# Patient Record
Sex: FEMALE | Race: WHITE | NOT HISPANIC OR LATINO | Employment: FULL TIME | URBAN - METROPOLITAN AREA
[De-identification: names, ages, dates, MRNs, and addresses within clinical notes are randomized per-mention and may not be internally consistent; named-entity substitution may affect disease eponyms.]

---

## 2017-05-09 ENCOUNTER — TELEPHONE (OUTPATIENT)
Dept: OBSTETRICS AND GYNECOLOGY | Age: 27
End: 2017-05-09

## 2018-02-14 ENCOUNTER — TELEPHONE (OUTPATIENT)
Dept: OBSTETRICS AND GYNECOLOGY | Age: 28
End: 2018-02-14

## 2018-02-16 ENCOUNTER — PROCEDURE VISIT (OUTPATIENT)
Dept: OBSTETRICS AND GYNECOLOGY | Age: 28
End: 2018-02-16

## 2018-02-16 ENCOUNTER — OFFICE VISIT (OUTPATIENT)
Dept: OBSTETRICS AND GYNECOLOGY | Age: 28
End: 2018-02-16

## 2018-02-16 VITALS
BODY MASS INDEX: 21.07 KG/M2 | DIASTOLIC BLOOD PRESSURE: 80 MMHG | WEIGHT: 147.2 LBS | SYSTOLIC BLOOD PRESSURE: 126 MMHG | HEIGHT: 70 IN

## 2018-02-16 DIAGNOSIS — R10.2 PELVIC PAIN: ICD-10-CM

## 2018-02-16 DIAGNOSIS — N83.201 CYST OF RIGHT OVARY: Primary | ICD-10-CM

## 2018-02-16 DIAGNOSIS — N94.2 VAGINISMUS: ICD-10-CM

## 2018-02-16 DIAGNOSIS — N83.209 CYST OF OVARY, UNSPECIFIED LATERALITY: Primary | ICD-10-CM

## 2018-02-16 PROCEDURE — 76830 TRANSVAGINAL US NON-OB: CPT | Performed by: OBSTETRICS & GYNECOLOGY

## 2018-02-16 PROCEDURE — 99213 OFFICE O/P EST LOW 20 MIN: CPT | Performed by: OBSTETRICS & GYNECOLOGY

## 2018-02-20 NOTE — PROGRESS NOTES
"Subjective     Chief Complaint   Patient presents with   • Follow-up     gyn us       Ailyn Bedoya is a 27 y.o.  whose LMP is Patient's last menstrual period was 2018 (approximate). presents with pelvic pain and h/o ovarian cyst. Seen in ER 2 days ago. CT scan noted \"fluid in pelvis and right ovarian cyst. Was having lower abdominal pain. dc'd ocps due to amenorrhea (was taking lo loestrin). Desires pregnancy maybe in .  3/2017 and was not sexually active before. She has been unable to have intercourse due to pain. She has had prior gyn pelvic exams w/ peds speculum that she did not tolerate well but she was able to tolerate a vaginal probe US today (although it was painful).      No Additional Complaints Reported    The following portions of the patient's history were reviewed and updated as appropriate:vital signs, allergies, current medications, past family history, past medical history, past social history, past surgical history and problem list      Review of Systems   Constitutional: negative for fever, chills, activity change, appetite change, fatigue and unexpected weight change.  Eyes: negative  Ears, nose, mouth, throat, and face: negative  Respiratory: negative  Cardiovascular: negative  Gastrointestinal: positive for abdominal pain  Genitourinary:positive for abnormal menstrual periods and dyspareunia  Musculoskeletal:negative  Neurological: negative  Behavioral/Psych: negative     Objective      /80  Ht 176.5 cm (69.5\")  Wt 66.8 kg (147 lb 3.2 oz)  LMP 2018 (Approximate)  BMI 21.43 kg/m2    Physical Exam    General:   alert, appears stated age and no distress   Heart: Not performed today   Lungs: Not performed today.   Breast: Not performed today   Neck:     Abdomen: {Not performed today   CVA: Not performed today   Pelvis: Not performed today   Extremities: Not performed today   Neurologic: negative   Psychiatric: Normal affect, judgement, and mood       Lab " Review   Labs: pap 3/2015 normal     Imaging   Ultrasound - Pelvic Vaginal  Uterus 8 x 5/7 x 4.4 cm, EML 5.9 mm, left ovary normal, right ovary w/ 2.7.x.2.4cm simple cyst, no free fluid    Assessment/Plan     ASSESSMENT  1. Cyst of right ovary    2. Pelvic pain    3. Vaginismus        PLAN  1. No orders of the defined types were placed in this encounter.      2. Medications prescribed this encounter:      No orders of the defined types were placed in this encounter.      3. Discussed functional ovarian cysts, recheck 6 wk to make sure it is resolved, not likely present all the time, and therefore not likely the etiology of why she has not been able to have intercourse. Discussed restarting ocps to suppress new ovarian cysts.    4. Likely cause of inability to have intercourse is vaginismus. Hymen not likely a problem as she was able to have a vaginal probe US and prior speculum exam, although it was very difficult for her. Recommend trying vaginal dilators at home w/ lubrication. Also discussed possibility to referral to a therapist for assistance.    Follow up: 6 week(s)    Aylin Hartman MD  2/20/2018

## 2018-03-14 ENCOUNTER — OFFICE VISIT CONVERTED (OUTPATIENT)
Dept: FAMILY MEDICINE CLINIC | Facility: CLINIC | Age: 28
End: 2018-03-14
Attending: NURSE PRACTITIONER

## 2018-04-11 ENCOUNTER — OFFICE VISIT (OUTPATIENT)
Dept: OBSTETRICS AND GYNECOLOGY | Age: 28
End: 2018-04-11

## 2018-04-11 ENCOUNTER — PROCEDURE VISIT (OUTPATIENT)
Dept: OBSTETRICS AND GYNECOLOGY | Age: 28
End: 2018-04-11

## 2018-04-11 VITALS
HEIGHT: 70 IN | DIASTOLIC BLOOD PRESSURE: 74 MMHG | SYSTOLIC BLOOD PRESSURE: 122 MMHG | WEIGHT: 147.2 LBS | BODY MASS INDEX: 21.07 KG/M2

## 2018-04-11 DIAGNOSIS — N83.201 OVARIAN CYST, RIGHT: Primary | ICD-10-CM

## 2018-04-11 DIAGNOSIS — R10.2 PELVIC PAIN: ICD-10-CM

## 2018-04-11 DIAGNOSIS — N83.201 CYST OF RIGHT OVARY: Primary | ICD-10-CM

## 2018-04-11 PROCEDURE — 76830 TRANSVAGINAL US NON-OB: CPT | Performed by: OBSTETRICS & GYNECOLOGY

## 2018-04-11 PROCEDURE — 99213 OFFICE O/P EST LOW 20 MIN: CPT | Performed by: OBSTETRICS & GYNECOLOGY

## 2018-04-11 RX ORDER — DICYCLOMINE HYDROCHLORIDE 10 MG/1
10 CAPSULE ORAL
COMMUNITY
End: 2021-09-15

## 2018-04-11 NOTE — PROGRESS NOTES
"Subjective     Chief Complaint   Patient presents with   • Follow-up     right ovarian cyst       Ailyn Bedoya is a 27 y.o.  whose LMP is Patient's last menstrual period was 04/10/2018 (exact date). presents for f/u of right ovarian cyst. 6 wks ago US measured 2.7 x 2.4 cm simple ovarian cyst. Pt had been seen in ER for pelvic pain and f/u in our office afterwards. She reports that the pelvic pain is better. She was also dx w/ vaginismus and was unable to be sexually active. This has improved lately. She plans pregnancy later this year.      No Additional Complaints Reported    The following portions of the patient's history were reviewed and updated as appropriate:vital signs, allergies, current medications, past family history, past medical history, past social history, past surgical history and problem list      Review of Systems   Constitutional: negative for fever, chills, activity change, appetite change, fatigue and unexpected weight change.  Eyes: negative  Ears, nose, mouth, throat, and face: negative  Respiratory: negative  Cardiovascular: negative  Gastrointestinal: negative  Genitourinary:negative  Musculoskeletal:negative  Neurological: negative  Behavioral/Psych: negative     Objective      /74   Ht 176.5 cm (69.5\")   Wt 66.8 kg (147 lb 3.2 oz)   LMP 04/10/2018 (Exact Date)   BMI 21.43 kg/m²     Physical Exam    General:   alert, appears stated age and no distress   Heart: Not performed today   Lungs: Not performed today.   Breast: Not performed today   Neck:     Abdomen: {soft NT ND   CVA: Not performed today   Pelvis: Not performed today   Extremities: Not performed today   Neurologic: negative   Psychiatric: Normal affect, judgement, and mood       Lab Review   Labs: No data reviewed     Imaging   Ultrasound - Pelvic Vaginal  Uterus 7 x 5 x 3.7 cm, normal ovaries, small right ovarian follicle, no free fluid, no masses    Assessment/Plan     ASSESSMENT  1. Ovarian cyst, right    2. " Pelvic pain        PLAN  1. No orders of the defined types were placed in this encounter.      2. Medications prescribed this encounter:        New Medications Ordered This Visit   Medications   • ibuprofen (ADVIL,MOTRIN) 100 MG tablet     Sig: Take 100 mg by mouth.   • dicyclomine (BENTYL) 10 MG capsule     Sig: Take 10 mg by mouth 4 (Four) Times a Day Before Meals & at Bedtime.       3. Right ovarian cyst resolved spontaneously, no further evaluation needed at this time  4. Recommend prenatal vitamins for preconception, recommend starting now as she is not on contraception    Follow up: annual and prn    Aylin Hartman MD  4/12/2018

## 2019-09-16 ENCOUNTER — OFFICE VISIT CONVERTED (OUTPATIENT)
Dept: FAMILY MEDICINE CLINIC | Facility: CLINIC | Age: 29
End: 2019-09-16
Attending: NURSE PRACTITIONER

## 2019-11-16 ENCOUNTER — HOSPITAL ENCOUNTER (EMERGENCY)
Facility: HOSPITAL | Age: 29
Discharge: HOME | End: 2019-11-16
Attending: EMERGENCY MEDICINE | Admitting: EMERGENCY MEDICINE

## 2019-11-16 VITALS
HEIGHT: 69 IN | DIASTOLIC BLOOD PRESSURE: 63 MMHG | HEART RATE: 68 BPM | WEIGHT: 159 LBS | SYSTOLIC BLOOD PRESSURE: 101 MMHG | TEMPERATURE: 98 F | BODY MASS INDEX: 23.55 KG/M2 | RESPIRATION RATE: 16 BRPM | OXYGEN SATURATION: 98 %

## 2019-11-16 DIAGNOSIS — M54.42 ACUTE LEFT-SIDED LOW BACK PAIN WITH LEFT-SIDED SCIATICA: Primary | ICD-10-CM

## 2019-11-16 LAB
ALBUMIN SERPL-MCNC: 4.7 G/DL (ref 3.5–5.2)
ALBUMIN/GLOB SERPL: 1.9 G/DL
ALP SERPL-CCNC: 81 U/L (ref 39–117)
ALT SERPL W P-5'-P-CCNC: 12 U/L (ref 1–33)
ANION GAP SERPL CALCULATED.3IONS-SCNC: 12.8 MMOL/L (ref 5–15)
AST SERPL-CCNC: 13 U/L (ref 1–32)
BASOPHILS # BLD AUTO: 0.05 10*3/MM3 (ref 0–0.2)
BASOPHILS NFR BLD AUTO: 1 % (ref 0–1.5)
BILIRUB SERPL-MCNC: 0.4 MG/DL (ref 0.2–1.2)
BILIRUB UR QL STRIP: NEGATIVE
BUN BLD-MCNC: 14 MG/DL (ref 6–20)
BUN/CREAT SERPL: 17.7 (ref 7–25)
CALCIUM SPEC-SCNC: 9.3 MG/DL (ref 8.6–10.5)
CHLORIDE SERPL-SCNC: 105 MMOL/L (ref 98–107)
CLARITY UR: CLEAR
CO2 SERPL-SCNC: 27.2 MMOL/L (ref 22–29)
COLOR UR: YELLOW
CREAT BLD-MCNC: 0.79 MG/DL (ref 0.57–1)
DEPRECATED RDW RBC AUTO: 40.8 FL (ref 37–54)
EOSINOPHIL # BLD AUTO: 0.1 10*3/MM3 (ref 0–0.4)
EOSINOPHIL NFR BLD AUTO: 2.1 % (ref 0.3–6.2)
ERYTHROCYTE [DISTWIDTH] IN BLOOD BY AUTOMATED COUNT: 12.6 % (ref 12.3–15.4)
GFR SERPL CREATININE-BSD FRML MDRD: 87 ML/MIN/1.73
GLOBULIN UR ELPH-MCNC: 2.5 GM/DL
GLUCOSE BLD-MCNC: 77 MG/DL (ref 65–99)
GLUCOSE UR STRIP-MCNC: NEGATIVE MG/DL
HCG SERPL QL: NEGATIVE
HCT VFR BLD AUTO: 40.3 % (ref 34–46.6)
HGB BLD-MCNC: 13 G/DL (ref 12–15.9)
HGB UR QL STRIP.AUTO: NEGATIVE
IMM GRANULOCYTES # BLD AUTO: 0.02 10*3/MM3 (ref 0–0.05)
IMM GRANULOCYTES NFR BLD AUTO: 0.4 % (ref 0–0.5)
KETONES UR QL STRIP: NEGATIVE
LEUKOCYTE ESTERASE UR QL STRIP.AUTO: NEGATIVE
LYMPHOCYTES # BLD AUTO: 1.71 10*3/MM3 (ref 0.7–3.1)
LYMPHOCYTES NFR BLD AUTO: 35.5 % (ref 19.6–45.3)
MCH RBC QN AUTO: 28.5 PG (ref 26.6–33)
MCHC RBC AUTO-ENTMCNC: 32.3 G/DL (ref 31.5–35.7)
MCV RBC AUTO: 88.4 FL (ref 79–97)
MONOCYTES # BLD AUTO: 0.4 10*3/MM3 (ref 0.1–0.9)
MONOCYTES NFR BLD AUTO: 8.3 % (ref 5–12)
NEUTROPHILS # BLD AUTO: 2.54 10*3/MM3 (ref 1.7–7)
NEUTROPHILS NFR BLD AUTO: 52.7 % (ref 42.7–76)
NITRITE UR QL STRIP: NEGATIVE
NRBC BLD AUTO-RTO: 0 /100 WBC (ref 0–0.2)
PH UR STRIP.AUTO: 5.5 [PH] (ref 5–8)
PLATELET # BLD AUTO: 295 10*3/MM3 (ref 140–450)
PMV BLD AUTO: 9.5 FL (ref 6–12)
POTASSIUM BLD-SCNC: 4 MMOL/L (ref 3.5–5.2)
PROT SERPL-MCNC: 7.2 G/DL (ref 6–8.5)
PROT UR QL STRIP: NEGATIVE
RBC # BLD AUTO: 4.56 10*6/MM3 (ref 3.77–5.28)
SODIUM BLD-SCNC: 145 MMOL/L (ref 136–145)
SP GR UR STRIP: 1.02 (ref 1–1.03)
UROBILINOGEN UR QL STRIP: NORMAL
WBC NRBC COR # BLD: 4.82 10*3/MM3 (ref 3.4–10.8)

## 2019-11-16 PROCEDURE — 80053 COMPREHEN METABOLIC PANEL: CPT | Performed by: NURSE PRACTITIONER

## 2019-11-16 PROCEDURE — 63710000001 PREDNISONE PER 1 MG: Performed by: NURSE PRACTITIONER

## 2019-11-16 PROCEDURE — 81003 URINALYSIS AUTO W/O SCOPE: CPT | Performed by: NURSE PRACTITIONER

## 2019-11-16 PROCEDURE — 96374 THER/PROPH/DIAG INJ IV PUSH: CPT

## 2019-11-16 PROCEDURE — 84703 CHORIONIC GONADOTROPIN ASSAY: CPT | Performed by: NURSE PRACTITIONER

## 2019-11-16 PROCEDURE — 25010000002 KETOROLAC TROMETHAMINE PER 15 MG: Performed by: NURSE PRACTITIONER

## 2019-11-16 PROCEDURE — 99283 EMERGENCY DEPT VISIT LOW MDM: CPT

## 2019-11-16 PROCEDURE — 85025 COMPLETE CBC W/AUTO DIFF WBC: CPT | Performed by: NURSE PRACTITIONER

## 2019-11-16 RX ORDER — PREDNISONE 20 MG/1
60 TABLET ORAL ONCE
Status: COMPLETED | OUTPATIENT
Start: 2019-11-16 | End: 2019-11-16

## 2019-11-16 RX ORDER — TRAMADOL HYDROCHLORIDE 50 MG/1
50 TABLET ORAL ONCE
Status: COMPLETED | OUTPATIENT
Start: 2019-11-16 | End: 2019-11-16

## 2019-11-16 RX ORDER — SODIUM CHLORIDE 0.9 % (FLUSH) 0.9 %
10 SYRINGE (ML) INJECTION AS NEEDED
Status: DISCONTINUED | OUTPATIENT
Start: 2019-11-16 | End: 2019-11-16 | Stop reason: HOSPADM

## 2019-11-16 RX ORDER — NAPROXEN 500 MG/1
500 TABLET ORAL 2 TIMES DAILY WITH MEALS
Qty: 25 TABLET | Refills: 0 | Status: SHIPPED | OUTPATIENT
Start: 2019-11-16 | End: 2021-09-15

## 2019-11-16 RX ORDER — PREDNISONE 20 MG/1
40 TABLET ORAL DAILY
Qty: 10 TABLET | Refills: 0 | Status: SHIPPED | OUTPATIENT
Start: 2019-11-16 | End: 2019-11-21

## 2019-11-16 RX ORDER — KETOROLAC TROMETHAMINE 30 MG/ML
15 INJECTION, SOLUTION INTRAMUSCULAR; INTRAVENOUS ONCE
Status: COMPLETED | OUTPATIENT
Start: 2019-11-16 | End: 2019-11-16

## 2019-11-16 RX ORDER — BACLOFEN 10 MG/1
10 TABLET ORAL 2 TIMES DAILY
Qty: 15 TABLET | Refills: 0 | Status: SHIPPED | OUTPATIENT
Start: 2019-11-16 | End: 2021-09-15

## 2019-11-16 RX ADMIN — TRAMADOL HYDROCHLORIDE 50 MG: 50 TABLET, FILM COATED ORAL at 14:35

## 2019-11-16 RX ADMIN — KETOROLAC TROMETHAMINE 15 MG: 30 INJECTION, SOLUTION INTRAMUSCULAR at 13:23

## 2019-11-16 RX ADMIN — PREDNISONE 60 MG: 20 TABLET ORAL at 13:22

## 2019-11-16 NOTE — DISCHARGE INSTRUCTIONS
"Ice to the area 4-5 times a day    (no heat for 4 days)    Can do over the counter muscle rub    Can try some back stretches    Return Precautions    Although you are being discharged from the ED today, I encourage you to return for worsening symptoms.  Things can, and do, change such that treatment at home with medication may not be adequate.      Specifically, return for any of the following:loss of bowel or bladder control    Chest pain, shortness of breath, pain or nausea and vomiting not controlled by medications provided.    Please make a follow up with your Primary Care Provider for a blood pressure recheck.   _____________________________________________________________________  STROKE IS AN EMERGENCY:  Act FAST and check for these signals    Face:   Does the face look uneven?  Arm:   Does one arm drift down?  Speech:  Does their speech sound strange?  Time:   Call 911 for any signs of a stroke    Stroke risk factors:  Atrial fibrillation  Diabetes   Family history of stroke  Heart disease  High cholesterol   Physical inactivity   Obesity   High blood pressure  Smoking      HEART ATTACK INFORMATION:  Symptoms of a heart attack:    Nausea       Sweating, or cold sweat   Feeling of impending doom    Jaw pain  Pain that travels down one or both arms   Shortness of breath  Chest pressure, squeezing or discomfort   Anxiety  Feeling of \"fullness\" in chest      Risk factors for heart attack:    Smoking    High cholesterol  High blood pressure  Family History    Obesity   Lack of exercise  Gender (men higher risk)  Diet   Stress  Age     Diabetes  Excessive alcohol     Cigarette smoking:  Cigarette smoking WILL shorten your life and causes many illnesses.  We recommend you stop smoking.  A free resource is :  1-800 QUIT NOW    National Suicide Prevention Lifeline:  1-614.612.1928  This is a national network of local crisis centers who provide free and confidential emotional support for people with emotional crisis " or emotional distress.    This service is provided 24 hours a day, 7 days a week

## 2019-11-16 NOTE — ED PROVIDER NOTES
Pt is a 29 y.o. female who presents to the ED complaining of left flank pain that radiates down to her left knee and into her LLQ. Pt states her pain is worse with movement, such as bending or twisting, and bearing weight on LLE. She denies taking anything for the pain. She reports Hx of UTI. She finished Macrobid 6 days ago. She denies urinary/fecal incontinence or fever. Pt is 4 months post partum. Pt is weaning off pumping.     On exam,  Constitutional: mildly distressed  Cardiovascular: heart is RRR, no murmur  Pulmonary: CTAB  Abdomen: soft, normoactive bowel sounds, nontender, nondistended  Musculoskeletal: C, T-, and L-spine are nontender. Positive leg raise at 90 degree bilaterally. 2+ patellar reflexes bilaterally.    Neurological: Awake, alert, leg strength 5/5 bilaterally with intact sensation.  Skin: no rash on back    Labs reviewed.   Urine negative; blood work is normal    Plan: Symptomatic treatment. Discharge pt home      MD ATTESTATION NOTE    The RADHA and I have discussed this patient's history, physical exam, and treatment plan.  I have reviewed the documentation and personally had a face to face interaction with the patient. I affirm the documentation and agree with the treatment and plan.  The attached note describes my personal findings.      Documentation assistance provided by ramiro Flanagan for Dr. Bridges. Information recorded by the ramiro was done at my direction and has been verified and validated by me.                Kirby Flanagan  11/16/19 9673       Zenon Bridges MD  11/16/19 0493

## 2019-11-16 NOTE — ED PROVIDER NOTES
EMERGENCY DEPARTMENT ENCOUNTER    Room Number:  39/39  Date seen:  2019  Time seen: 12:59 PM  PCP: Taryn Ortega PA-C  Historian: Jayce    HPI:  Chief complaint: Left flank pain  Context:Ailyn Bedoya is a 29 y.o. female who presents to the ED with c/o sharp left flank pain for 2-3 days that worsened this morning. Her pain intermittently radiates to her left hip and down her left leg. Pt's pain is aggravated with movement. She denies injury to back. fever, CP, SOA, abd pain, N/V/D, dysuria, and frequency. Pt had a UTI 2 weeks and was seen at a [facility]. She took a 7 day course of Nitrofurantoin. Pt saw her gynecologist yesterday and was told she has RBCs in her urinalysis. Pt is 4 months post partum and is pumping breast milk. Her first menstrual period since giving birth was 1 week ago.      Timing: constant   Duration: 2-3 days  Location: L flank  Quality: sharp  Intensity/Severity: moderate   Associated Symptoms: none   Aggravating Factors: movement  Alleviating Factors: none stated   Treatment before arrival: Pt was treated with Nitrofurantoin for a UTI 2 weeks ago        ALLERGIES  Patient has no known allergies.    PAST MEDICAL HISTORY  Active Ambulatory Problems     Diagnosis Date Noted   • No Active Ambulatory Problems     Resolved Ambulatory Problems     Diagnosis Date Noted   • No Resolved Ambulatory Problems     Past Medical History:   Diagnosis Date   • Ovarian cyst        PAST SURGICAL HISTORY  Past Surgical History:   Procedure Laterality Date   •  SECTION         FAMILY HISTORY  History reviewed. No pertinent family history.    SOCIAL HISTORY  Social History     Socioeconomic History   • Marital status: Single     Spouse name: Not on file   • Number of children: Not on file   • Years of education: Not on file   • Highest education level: Not on file   Tobacco Use   • Smoking status: Never Smoker   • Smokeless tobacco: Never Used   Substance and Sexual Activity   • Alcohol use:  No   • Drug use: No       REVIEW OF SYSTEMS  Review of Systems   Constitutional: Negative for activity change, appetite change, diaphoresis and fever.   HENT: Negative for trouble swallowing.    Eyes: Negative for visual disturbance.   Respiratory: Negative for cough, chest tightness, shortness of breath and wheezing.    Cardiovascular: Negative for chest pain, palpitations and leg swelling.   Gastrointestinal: Negative for abdominal pain, diarrhea, nausea and vomiting.   Genitourinary: Positive for flank pain (left). Negative for dysuria, frequency and hematuria.   Musculoskeletal: Negative for back pain.   Skin: Negative for rash.   Neurological: Negative for dizziness, speech difficulty and light-headedness.       PHYSICAL EXAM  ED Triage Vitals   Temp Heart Rate Resp BP SpO2   11/16/19 1237 11/16/19 1237 11/16/19 1237 11/16/19 1249 11/16/19 1237   98.2 °F (36.8 °C) 94 16 136/79 99 %      Temp src Heart Rate Source Patient Position BP Location FiO2 (%)   11/16/19 1237 11/16/19 1237 11/16/19 1249 11/16/19 1249 --   Tympanic Monitor Sitting Right arm      Physical Exam   Constitutional: She is oriented to person, place, and time and well-developed, well-nourished, and in no distress. She does not have a sickly appearance. No distress.   HENT:   Head: Normocephalic and atraumatic.   Mouth/Throat: Uvula is midline, oropharynx is clear and moist and mucous membranes are normal.   Eyes: EOM are normal. Pupils are equal, round, and reactive to light.   Neck: Normal range of motion. Neck supple.   Cardiovascular: Normal rate, regular rhythm, S1 normal, S2 normal and normal heart sounds. Exam reveals no gallop and no friction rub.   No murmur heard.  Pulmonary/Chest: Effort normal and breath sounds normal. She has no decreased breath sounds. She has no wheezes. She has no rhonchi. She has no rales.   Abdominal: Soft. Normal appearance and bowel sounds are normal. There is no tenderness. There is no rebound, no guarding  and no CVA tenderness.   Musculoskeletal: Normal range of motion.   Left lower lumbar tenderness in the area of the SI joint, there is no rash, no erythema,no step off   Neurological: She is alert and oriented to person, place, and time. GCS score is 15.   Skin: Skin is warm, dry and intact.   Psychiatric: Affect and judgment normal.   Nursing note and vitals reviewed.      LAB RESULTS  Recent Results (from the past 24 hour(s))   Urinalysis With Microscopic If Indicated (No Culture) - Urine, Clean Catch    Collection Time: 11/16/19 12:49 PM   Result Value Ref Range    Color, UA Yellow Yellow, Straw    Appearance, UA Clear Clear    pH, UA 5.5 5.0 - 8.0    Specific Gravity, UA 1.016 1.005 - 1.030    Glucose, UA Negative Negative    Ketones, UA Negative Negative    Bilirubin, UA Negative Negative    Blood, UA Negative Negative    Protein, UA Negative Negative    Leuk Esterase, UA Negative Negative    Nitrite, UA Negative Negative    Urobilinogen, UA 0.2 E.U./dL 0.2 - 1.0 E.U./dL   Comprehensive Metabolic Panel    Collection Time: 11/16/19  1:05 PM   Result Value Ref Range    Glucose 77 65 - 99 mg/dL    BUN 14 6 - 20 mg/dL    Creatinine 0.79 0.57 - 1.00 mg/dL    Sodium 145 136 - 145 mmol/L    Potassium 4.0 3.5 - 5.2 mmol/L    Chloride 105 98 - 107 mmol/L    CO2 27.2 22.0 - 29.0 mmol/L    Calcium 9.3 8.6 - 10.5 mg/dL    Total Protein 7.2 6.0 - 8.5 g/dL    Albumin 4.70 3.50 - 5.20 g/dL    ALT (SGPT) 12 1 - 33 U/L    AST (SGOT) 13 1 - 32 U/L    Alkaline Phosphatase 81 39 - 117 U/L    Total Bilirubin 0.4 0.2 - 1.2 mg/dL    eGFR Non African Amer 87 >60 mL/min/1.73    Globulin 2.5 gm/dL    A/G Ratio 1.9 g/dL    BUN/Creatinine Ratio 17.7 7.0 - 25.0    Anion Gap 12.8 5.0 - 15.0 mmol/L   hCG, Serum, Qualitative    Collection Time: 11/16/19  1:05 PM   Result Value Ref Range    HCG Qualitative Negative Negative   CBC Auto Differential    Collection Time: 11/16/19  1:05 PM   Result Value Ref Range    WBC 4.82 3.40 - 10.80 10*3/mm3     RBC 4.56 3.77 - 5.28 10*6/mm3    Hemoglobin 13.0 12.0 - 15.9 g/dL    Hematocrit 40.3 34.0 - 46.6 %    MCV 88.4 79.0 - 97.0 fL    MCH 28.5 26.6 - 33.0 pg    MCHC 32.3 31.5 - 35.7 g/dL    RDW 12.6 12.3 - 15.4 %    RDW-SD 40.8 37.0 - 54.0 fl    MPV 9.5 6.0 - 12.0 fL    Platelets 295 140 - 450 10*3/mm3    Neutrophil % 52.7 42.7 - 76.0 %    Lymphocyte % 35.5 19.6 - 45.3 %    Monocyte % 8.3 5.0 - 12.0 %    Eosinophil % 2.1 0.3 - 6.2 %    Basophil % 1.0 0.0 - 1.5 %    Immature Grans % 0.4 0.0 - 0.5 %    Neutrophils, Absolute 2.54 1.70 - 7.00 10*3/mm3    Lymphocytes, Absolute 1.71 0.70 - 3.10 10*3/mm3    Monocytes, Absolute 0.40 0.10 - 0.90 10*3/mm3    Eosinophils, Absolute 0.10 0.00 - 0.40 10*3/mm3    Basophils, Absolute 0.05 0.00 - 0.20 10*3/mm3    Immature Grans, Absolute 0.02 0.00 - 0.05 10*3/mm3    nRBC 0.0 0.0 - 0.2 /100 WBC       Ordered the above labs and independently reviewed the results.         MEDICATIONS GIVEN IN ER  Medications   sodium chloride 0.9 % flush 10 mL (not administered)   ketorolac (TORADOL) injection 15 mg (15 mg Intravenous Given 11/16/19 1323)   predniSONE (DELTASONE) tablet 60 mg (60 mg Oral Given 11/16/19 1322)         PROCEDURES  Procedures        PROGRESS, DATA ANALYSIS, CONSULTS AND MEDICAL DECISION MAKING  All labs have been independently reviewed by me. Discussion below represents my analysis of pertinent findings related to patient's condition, differential diagnosis, treatment plan and final disposition.          1:10 PM  Prednisone and Toradol ordered for pain.     1:55 PM  Rechecked pt who is resting in NAD. Informed pt of unremarkable labs. I think her pain is musculoskeletal in nature. Plan to discharge pt with muscle relaxers, steroids, and antiinflammatories. Pt understands and agrees with the plan, all questions answered.  Reviewed pt's history and workup with Dr. Bridges.  After a bedside evaluation, Dr. Bridges agrees with the plan of care.    DISCHARGE  The patient's history,  "physical exam, and lab findings were discussed with the physician, who also performed a face to face history and physical exam.  I discussed all results and noted any abnormalities with patient.  Discussed absoute need to recheck abnormalities with their family physician.  I answered any of the patient's questions.  Discussed plan for discharge, as there is no emergent indication for admission.  Pt is agreeable and understands need for follow up and repeat testing.  Pt is aware that discharge does not mean that nothing is wrong but it indicates no emergency is present and they must continue care with their family physician.  Pt is discharged with instructions to follow up with primary care doctor to have their blood pressure rechecked.       Disposition vitals:  /79 (BP Location: Right arm, Patient Position: Sitting)   Pulse 94   Temp 98.2 °F (36.8 °C) (Tympanic)   Resp 16   Ht 175.3 cm (69\")   Wt 72.1 kg (159 lb)   LMP 11/13/2019   SpO2 99%   BMI 23.48 kg/m²       DIAGNOSIS  Final diagnoses:   Acute left-sided low back pain with left-sided sciatica       FOLLOW UP   Taryn Ortega, SKY  [address] [place], [place] [id number]  [phone]    Schedule an appointment as soon as possible for a visit in 1 week  As needed, If symptoms worsen      RX     Medication List      New Prescriptions    baclofen 10 MG tablet  Commonly known as:  LIORESAL  Take 1 tablet by mouth 2 (Two) Times a Day. Can break into 1/2 if too   sedating     naproxen 500 MG tablet  Commonly known as:  NAPROSYN  Take 1 tablet by mouth 2 (Two) Times a Day With Meals.     predniSONE 20 MG tablet  Commonly known as:  DELTASONE  Take 2 tablets by mouth Daily for 5 days.            Documentation assistance provided by ramiro Camacho for MONO Pepper.  Information recorded by the ramiro was done at my direction and has been verified and validated by me.       Taryn Camacho  11/16/19 8688       Gabby Craig APRN  11/16/19 1404    "

## 2020-03-12 ENCOUNTER — OFFICE VISIT CONVERTED (OUTPATIENT)
Dept: FAMILY MEDICINE CLINIC | Facility: CLINIC | Age: 30
End: 2020-03-12
Attending: NURSE PRACTITIONER

## 2020-03-12 ENCOUNTER — HOSPITAL ENCOUNTER (OUTPATIENT)
Dept: FAMILY MEDICINE CLINIC | Facility: CLINIC | Age: 30
Discharge: HOME | End: 2020-03-12
Attending: NURSE PRACTITIONER

## 2020-03-13 ENCOUNTER — HOSPITAL ENCOUNTER (OUTPATIENT)
Dept: LAB | Facility: HOSPITAL | Age: 30
Discharge: HOME | End: 2020-03-13
Attending: NURSE PRACTITIONER

## 2020-03-13 LAB
CONV CREATININE URINE, RANDOM: 55.7 MG/DL (ref 10–300)
CONV PROTEIN TO CREATININE RATIO (RANDOM URINE): 0.1 {RATIO} (ref 0–0.1)
PROT UR-MCNC: 5.7 MG/DL

## 2020-03-14 LAB — BACTERIA UR CULT: NORMAL

## 2020-11-03 ENCOUNTER — HOSPITAL ENCOUNTER (OUTPATIENT)
Dept: LAB | Facility: HOSPITAL | Age: 30
Discharge: HOME | End: 2020-11-03
Attending: NURSE PRACTITIONER

## 2020-11-03 ENCOUNTER — OFFICE VISIT CONVERTED (OUTPATIENT)
Dept: FAMILY MEDICINE CLINIC | Facility: CLINIC | Age: 30
End: 2020-11-03
Attending: NURSE PRACTITIONER

## 2020-11-03 LAB
ALBUMIN SERPL-MCNC: 4.3 G/DL (ref 3.5–5)
ALBUMIN/GLOB SERPL: 1.9 {RATIO} (ref 1.4–2.6)
ALP SERPL-CCNC: 56 U/L (ref 42–98)
ALT SERPL-CCNC: 8 U/L (ref 10–40)
AMYLASE SERPL-CCNC: 80 U/L (ref 30–110)
ANION GAP SERPL CALC-SCNC: 14 MMOL/L (ref 8–19)
AST SERPL-CCNC: 14 U/L (ref 15–50)
BASOPHILS # BLD AUTO: 0.04 10*3/UL (ref 0–0.2)
BASOPHILS NFR BLD AUTO: 0.7 % (ref 0–3)
BILIRUB SERPL-MCNC: 0.42 MG/DL (ref 0.2–1.3)
BUN SERPL-MCNC: 10 MG/DL (ref 5–25)
BUN/CREAT SERPL: 14 {RATIO} (ref 6–20)
CALCIUM SERPL-MCNC: 9 MG/DL (ref 8.7–10.4)
CHLORIDE SERPL-SCNC: 103 MMOL/L (ref 99–111)
CONV ABS IMM GRAN: 0.01 10*3/UL (ref 0–0.2)
CONV CO2: 26 MMOL/L (ref 22–32)
CONV IMMATURE GRAN: 0.2 % (ref 0–1.8)
CONV TOTAL PROTEIN: 6.6 G/DL (ref 6.3–8.2)
CREAT UR-MCNC: 0.74 MG/DL (ref 0.5–0.9)
DEPRECATED RDW RBC AUTO: 43 FL (ref 36.4–46.3)
EOSINOPHIL # BLD AUTO: 0.09 10*3/UL (ref 0–0.7)
EOSINOPHIL # BLD AUTO: 1.6 % (ref 0–7)
ERYTHROCYTE [DISTWIDTH] IN BLOOD BY AUTOMATED COUNT: 12.4 % (ref 11.7–14.4)
GFR SERPLBLD BASED ON 1.73 SQ M-ARVRAT: >60 ML/MIN/{1.73_M2}
GLOBULIN UR ELPH-MCNC: 2.3 G/DL (ref 2–3.5)
GLUCOSE SERPL-MCNC: 83 MG/DL (ref 65–99)
HCT VFR BLD AUTO: 43.3 % (ref 37–47)
HGB BLD-MCNC: 13.5 G/DL (ref 12–16)
LIPASE SERPL-CCNC: 20 U/L (ref 5–51)
LYMPHOCYTES # BLD AUTO: 2.02 10*3/UL (ref 1–5)
LYMPHOCYTES NFR BLD AUTO: 36.8 % (ref 20–45)
MCH RBC QN AUTO: 29.3 PG (ref 27–31)
MCHC RBC AUTO-ENTMCNC: 31.2 G/DL (ref 33–37)
MCV RBC AUTO: 94.1 FL (ref 81–99)
MONOCYTES # BLD AUTO: 0.36 10*3/UL (ref 0.2–1.2)
MONOCYTES NFR BLD AUTO: 6.6 % (ref 3–10)
NEUTROPHILS # BLD AUTO: 2.97 10*3/UL (ref 2–8)
NEUTROPHILS NFR BLD AUTO: 54.1 % (ref 30–85)
NRBC CBCN: 0 % (ref 0–0.7)
OSMOLALITY SERPL CALC.SUM OF ELEC: 286 MOSM/KG (ref 273–304)
PLATELET # BLD AUTO: 266 10*3/UL (ref 130–400)
PMV BLD AUTO: 9.9 FL (ref 9.4–12.3)
POTASSIUM SERPL-SCNC: 4.1 MMOL/L (ref 3.5–5.3)
RBC # BLD AUTO: 4.6 10*6/UL (ref 4.2–5.4)
SODIUM SERPL-SCNC: 139 MMOL/L (ref 135–147)
T4 FREE SERPL-MCNC: 1.1 NG/DL (ref 0.9–1.8)
TSH SERPL-ACNC: 1.89 M[IU]/L (ref 0.27–4.2)
WBC # BLD AUTO: 5.49 10*3/UL (ref 4.8–10.8)

## 2020-11-05 ENCOUNTER — HOSPITAL ENCOUNTER (OUTPATIENT)
Dept: ULTRASOUND IMAGING | Facility: HOSPITAL | Age: 30
Discharge: HOME | End: 2020-11-05
Attending: NURSE PRACTITIONER

## 2021-05-10 NOTE — H&P
History and Physical      Patient Name: Ailyn Bedoya   Patient ID: [id number]   Sex: Female   YOB: 1990    Primary Care Provider: Lazarus VILLAREAL    Visit Date: November 3, 2020    Provider: MONO Kwon   Location: [facility] [place]   Location Address: [address] [place] [address], [address] [place] [address]  [id number]   Location Phone: [phone]          Chief Complaint  · Establish Care  · Cyst on stomach that is painful and has gotten bigger      History Of Present Illness  Ailyn Bedoya is a 30 year old /White female who presents for evaluation and treatment of:      Patient presents to the office to establish care   patient states that she has been evaluated approximately a year ago for a nodule in her epigastric region.  Patient states that she was told it was a subcutaneous cyst.  Patient states that she feels that this is got somewhat larger since the last evaluation.  She states it is tender to palpation.  She denies any redness or drainage from the area.  Patient denies ever having any imaging or lab work completed.  She denies any nausea vomiting at this time.  Patient denies any fevers.       Past Medical History  Disease Name Date Onset Notes   IBS (irritable bowel syndrome) --  --    Pap smear for cervical cancer screening  --          Past Surgical History  Procedure Name Date Notes   Ceasarian section --  --    D and C (dilation and curettage) with endometrial biopsy --  --          Allergy List  Allergen Name Date Reaction Notes   NO KNOWN DRUG ALLERGIES --  --  --        Allergies Reconciled  Family Medical History  Disease Name Relative/Age Notes   *No Known Family History  --          Reproductive History  Menstrual   Age Menarche: 19   Pregnancy Summary   Total Pregnancies: 0 Full Term: 0 Premature: 0   Ab Induced: 0 Ab Spontaneous: 0 Ectopics: 0   Multiples: 0 Livin         Social History  Finding Status Start/Stop Quantity Notes   Active but  "no formal exercise --  --/-- --  --    Alcohol Never --/-- --  03/14/2018 - never     --  --/-- --  --    No known infection risk --  --/-- --  --    Tobacco Never --/-- --  03/14/2018 - never          Review of Systems  · Constitutional  o Denies  o : fatigue, night sweats  · Eyes  o Denies  o : double vision, blurred vision  · HENT  o Denies  o : vertigo, recent head injury  · Breasts  o Denies  o : abnormal changes in breast size, additional breast symptoms except as noted in the HPI  · Cardiovascular  o Denies  o : chest pain, irregular heart beats  · Respiratory  o Denies  o : shortness of breath, productive cough  · Gastrointestinal  o Denies  o : nausea, vomiting  · Genitourinary  o Denies  o : dysuria, urinary retention  · Integument  o Denies  o : hair growth change, new skin lesions, changes to existing skin lesions or moles  · Neurologic  o Denies  o : altered mental status, seizures  · Musculoskeletal  o Denies  o : joint swelling, limitation of motion  · Endocrine  o Denies  o : cold intolerance, heat intolerance  · Heme-Lymph  o Denies  o : petechiae, lymph node enlargement or tenderness  · Allergic-Immunologic  o Denies  o : frequent illnesses      Vitals  Date Time BP Position Site L\R Cuff Size HR RR TEMP (F) WT  HT  BMI kg/m2 BSA m2 O2 Sat FR L/min FiO2        11/03/2020 09:38 /70 Sitting    92 - R  98.4 143lbs 0oz 5'  9\" 21.12 1.78 98 %            Physical Examination  · Constitutional  o Appearance  o : well-nourished, in no acute distress  · Neck  o Inspection/Palpation  o : normal appearance, no masses or tenderness, trachea midline  o Range of Motion  o : cervical range of motion within normal limits  o Thyroid  o : gland size normal, nontender, no nodules or masses present on palpation  · Respiratory  o Respiratory Effort  o : breathing unlabored  o Inspection of Chest  o : normal appearance  o Auscultation of Lungs  o : normal breath sounds throughout inspiration and " expiration  · Cardiovascular  o Heart  o :   § Auscultation of Heart  § : regular rate and rhythm, no murmurs, gallops or rubs  o Peripheral Vascular System  o :   § Extremities  § : no clubbing or edema  · Gastrointestinal  o Abdominal Examination  o : Mild tenderness noted at the epigastric region. Small round nodule noted with palpation proximately the size of a BB  · Lymphatic  o Neck  o : no lymphadenopathy present  · Musculoskeletal  o Spine  o :   § Inspection/Palpation  § : no spinal tenderness, scoliosis or kyphosis present  § Range of Motion  § : spine range of motion normal  o Right Upper Extremity  o :   § Inspection/Palpation  § : no tenderness to palpation, ROM normal  o Left Upper Extremity  o :   § Inspection/Palpation  § : no tenderness to palpation, ROM normal  o Right Lower Extremity  o :   § Inspection/Palpation  § : no joint or limb tenderness to palpation, ROM normal  o Left Lower Extremity  o :   § Inspection/Palpation  § : no joint or limb tenderness to palpation, ROM normal  · Skin and Subcutaneous Tissue  o General Inspection  o : no rashes or lesions present, no areas of discoloration  o Body Hair  o : hair normal for age, general body hair distribution normal for age  o Digits and Nails  o : no clubbing, cyanosis, deformities or edema present, normal appearing nails  · Neurologic  o Mental Status Examination  o :   § Orientation  § : grossly oriented to person, place and time  o Gait and Station  o : normal gait, able to stand without difficulty  · Psychiatric  o Judgement and Insight  o : judgment and insight intact  o Mood and Affect  o : mood normal, affect appropriate  o Presence of Abnormal Thoughts  o : no hallucinations, no delusions present, no psychotic thoughts          Assessment  · Screening for depression     V79.0/Z13.89  · Need for influenza vaccination     V04.81/Z23  · Soft tissue mass     729.99/M79.89  · Epigastric abdominal  pain     789.06/R10.13      Plan  · Orders  o Annual depression screening, 15 minutes (90909, ) - V79.0/Z13.89 - 11/03/2020  o ACO-18: Negative screen for clinical depression using a standardized tool () - V79.0/Z13.89 - 11/03/2020  o Immunization Admin Fee (Single) ([facility]) (70759) - V04.81/Z23 - 11/03/2020  o Fluzone Quadrivalent Vaccine, age 6 months + (21813) - V04.81/Z23 - 11/03/2020   Vaccine - Influenza; Dose: 0.5; Site: Left Deltoid; Route: Intramuscular; Date: 11/03/2020 09:58:00; Exp: 06/30/2021; Lot: [id number]; Mfg: Insight Plus pasteur; TradeName: Fluzone Quadrivalent; Administered By: Sandra Roth MA; Comment: Patient tolerated well  o CBC with Auto Diff [facility] (58120) - - 11/03/2020  o CMP [facility] (08522) - - 11/03/2020  o Thyroid Profile (99046, 80060, THYII) - - 11/03/2020  o ACO-39: Current medications updated and reviewed (1159F, ) - - 11/03/2020  o ACO-18: Negative screen for clinical depression using a standardized tool () - - 11/03/2020  o ACO-14: Influenza immunization administered or previously received [facility] () - - 11/03/2020  o ACO-40: Depression Remission at 12 months as demonstrated by a 12 month repeat PHQ-9 of less than 5 () - - 11/03/2020  o Amylase/Lipase Profile (ALPN) - - 11/03/2020  o US abdomen for localization (07302) - - 11/03/2020   soft tissue mass noted epigastric area  · Medications  o Medications have been Reconciled  o Transition of Care or Provider Policy  · Instructions  o Depression Screen completed and scanned into the EMR under the designated folder within the patient's documents.  o Today's PHQ-9 result is _0__  o Patient was educated/instructed on their diagnosis, treatment and medications prior to discharge from the clinic today.  o Minutes spent with patient including greater than 50% in Education/Counseling/Care Coordination.  o Time spent with the patient was minutes, more than 50% face to face.  o Electronically Identified Patient Education  Materials Provided Electronically  · Disposition  o Call or Return if symptoms worsen or persist.  o follow up in 6 months            Electronically Signed by: MONO Kwon -Author on November 3, 2020 10:34:03 AM

## 2021-05-14 VITALS
WEIGHT: 143 LBS | BODY MASS INDEX: 21.18 KG/M2 | HEIGHT: 69 IN | TEMPERATURE: 98.4 F | HEART RATE: 92 BPM | OXYGEN SATURATION: 98 % | SYSTOLIC BLOOD PRESSURE: 124 MMHG | DIASTOLIC BLOOD PRESSURE: 70 MMHG

## 2021-05-15 VITALS
DIASTOLIC BLOOD PRESSURE: 80 MMHG | SYSTOLIC BLOOD PRESSURE: 120 MMHG | HEIGHT: 69 IN | HEART RATE: 98 BPM | BODY MASS INDEX: 24.14 KG/M2 | WEIGHT: 163 LBS

## 2021-05-16 VITALS
BODY MASS INDEX: 21.62 KG/M2 | RESPIRATION RATE: 16 BRPM | DIASTOLIC BLOOD PRESSURE: 80 MMHG | SYSTOLIC BLOOD PRESSURE: 122 MMHG | TEMPERATURE: 98.5 F | WEIGHT: 146 LBS | HEIGHT: 69 IN | HEART RATE: 103 BPM | OXYGEN SATURATION: 99 %

## 2021-08-02 ENCOUNTER — APPOINTMENT (OUTPATIENT)
Dept: CT IMAGING | Facility: HOSPITAL | Age: 31
End: 2021-08-02

## 2021-08-02 ENCOUNTER — HOSPITAL ENCOUNTER (EMERGENCY)
Facility: HOSPITAL | Age: 31
Discharge: HOME | End: 2021-08-03
Attending: EMERGENCY MEDICINE | Admitting: EMERGENCY MEDICINE

## 2021-08-02 ENCOUNTER — APPOINTMENT (OUTPATIENT)
Dept: MRI IMAGING | Facility: HOSPITAL | Age: 31
End: 2021-08-02

## 2021-08-02 ENCOUNTER — TELEPHONE (OUTPATIENT)
Dept: FAMILY MEDICINE CLINIC | Facility: CLINIC | Age: 31
End: 2021-08-02

## 2021-08-02 DIAGNOSIS — R20.2 PARESTHESIA OF RIGHT LEG: Primary | ICD-10-CM

## 2021-08-02 LAB
ALBUMIN SERPL-MCNC: 4.6 G/DL (ref 3.5–5.2)
ALBUMIN/GLOB SERPL: 2 G/DL
ALP SERPL-CCNC: 57 U/L (ref 39–117)
ALT SERPL W P-5'-P-CCNC: 11 U/L (ref 1–33)
ANION GAP SERPL CALCULATED.3IONS-SCNC: 11.7 MMOL/L (ref 5–15)
APTT PPP: 25.3 SECONDS (ref 22.2–34.2)
AST SERPL-CCNC: 17 U/L (ref 1–32)
BASOPHILS # BLD AUTO: 0.05 10*3/MM3 (ref 0–0.2)
BASOPHILS NFR BLD AUTO: 0.6 % (ref 0–1.5)
BILIRUB SERPL-MCNC: 0.4 MG/DL (ref 0–1.2)
BUN SERPL-MCNC: 11 MG/DL (ref 6–20)
BUN/CREAT SERPL: 13.4 (ref 7–25)
CALCIUM SPEC-SCNC: 9.3 MG/DL (ref 8.6–10.5)
CHLORIDE SERPL-SCNC: 104 MMOL/L (ref 98–107)
CO2 SERPL-SCNC: 23.3 MMOL/L (ref 22–29)
CREAT SERPL-MCNC: 0.82 MG/DL (ref 0.57–1)
DEPRECATED RDW RBC AUTO: 40.7 FL (ref 37–54)
EOSINOPHIL # BLD AUTO: 0.02 10*3/MM3 (ref 0–0.4)
EOSINOPHIL NFR BLD AUTO: 0.2 % (ref 0.3–6.2)
ERYTHROCYTE [DISTWIDTH] IN BLOOD BY AUTOMATED COUNT: 12.1 % (ref 12.3–15.4)
GFR SERPL CREATININE-BSD FRML MDRD: 82 ML/MIN/1.73
GLOBULIN UR ELPH-MCNC: 2.3 GM/DL
GLUCOSE SERPL-MCNC: 100 MG/DL (ref 65–99)
HCG INTACT+B SERPL-ACNC: <0.5 MIU/ML
HCT VFR BLD AUTO: 41.3 % (ref 34–46.6)
HGB BLD-MCNC: 13.5 G/DL (ref 12–15.9)
IMM GRANULOCYTES # BLD AUTO: 0.01 10*3/MM3 (ref 0–0.05)
IMM GRANULOCYTES NFR BLD AUTO: 0.1 % (ref 0–0.5)
INR PPP: 1.04 (ref 2–3)
LYMPHOCYTES # BLD AUTO: 1.78 10*3/MM3 (ref 0.7–3.1)
LYMPHOCYTES NFR BLD AUTO: 21.2 % (ref 19.6–45.3)
MAGNESIUM SERPL-MCNC: 2 MG/DL (ref 1.6–2.6)
MCH RBC QN AUTO: 29.9 PG (ref 26.6–33)
MCHC RBC AUTO-ENTMCNC: 32.7 G/DL (ref 31.5–35.7)
MCV RBC AUTO: 91.6 FL (ref 79–97)
MONOCYTES # BLD AUTO: 0.28 10*3/MM3 (ref 0.1–0.9)
MONOCYTES NFR BLD AUTO: 3.3 % (ref 5–12)
NEUTROPHILS NFR BLD AUTO: 6.26 10*3/MM3 (ref 1.7–7)
NEUTROPHILS NFR BLD AUTO: 74.6 % (ref 42.7–76)
NRBC BLD AUTO-RTO: 0 /100 WBC (ref 0–0.2)
PLATELET # BLD AUTO: 299 10*3/MM3 (ref 140–450)
PMV BLD AUTO: 9.5 FL (ref 6–12)
POTASSIUM SERPL-SCNC: 3.9 MMOL/L (ref 3.5–5.2)
PROT SERPL-MCNC: 6.9 G/DL (ref 6–8.5)
PROTHROMBIN TIME: 11.3 SECONDS (ref 9.4–12)
RBC # BLD AUTO: 4.51 10*6/MM3 (ref 3.77–5.28)
SODIUM SERPL-SCNC: 139 MMOL/L (ref 136–145)
T4 FREE SERPL-MCNC: 1.27 NG/DL (ref 0.93–1.7)
TSH SERPL DL<=0.05 MIU/L-ACNC: 1.46 UIU/ML (ref 0.27–4.2)
WBC # BLD AUTO: 8.4 10*3/MM3 (ref 3.4–10.8)

## 2021-08-02 PROCEDURE — 84443 ASSAY THYROID STIM HORMONE: CPT | Performed by: EMERGENCY MEDICINE

## 2021-08-02 PROCEDURE — 70551 MRI BRAIN STEM W/O DYE: CPT

## 2021-08-02 PROCEDURE — 0 IOPAMIDOL PER 1 ML: Performed by: EMERGENCY MEDICINE

## 2021-08-02 PROCEDURE — 83735 ASSAY OF MAGNESIUM: CPT | Performed by: EMERGENCY MEDICINE

## 2021-08-02 PROCEDURE — 99283 EMERGENCY DEPT VISIT LOW MDM: CPT

## 2021-08-02 PROCEDURE — 85025 COMPLETE CBC W/AUTO DIFF WBC: CPT | Performed by: EMERGENCY MEDICINE

## 2021-08-02 PROCEDURE — 70498 CT ANGIOGRAPHY NECK: CPT

## 2021-08-02 PROCEDURE — 70450 CT HEAD/BRAIN W/O DYE: CPT

## 2021-08-02 PROCEDURE — 80053 COMPREHEN METABOLIC PANEL: CPT | Performed by: EMERGENCY MEDICINE

## 2021-08-02 PROCEDURE — 93005 ELECTROCARDIOGRAM TRACING: CPT | Performed by: EMERGENCY MEDICINE

## 2021-08-02 PROCEDURE — 84439 ASSAY OF FREE THYROXINE: CPT | Performed by: EMERGENCY MEDICINE

## 2021-08-02 PROCEDURE — 85730 THROMBOPLASTIN TIME PARTIAL: CPT | Performed by: EMERGENCY MEDICINE

## 2021-08-02 PROCEDURE — 84702 CHORIONIC GONADOTROPIN TEST: CPT | Performed by: EMERGENCY MEDICINE

## 2021-08-02 PROCEDURE — 85610 PROTHROMBIN TIME: CPT | Performed by: EMERGENCY MEDICINE

## 2021-08-02 RX ORDER — SODIUM CHLORIDE 0.9 % (FLUSH) 0.9 %
10 SYRINGE (ML) INJECTION AS NEEDED
Status: DISCONTINUED | OUTPATIENT
Start: 2021-08-02 | End: 2021-08-03 | Stop reason: HOSPADM

## 2021-08-02 RX ADMIN — IOPAMIDOL 100 ML: 755 INJECTION, SOLUTION INTRAVENOUS at 20:34

## 2021-08-02 NOTE — TELEPHONE ENCOUNTER
Caller: Aiyln Bedoya    Relationship to patient: Self    Best call back number: [phone]    Chief complaint: NUMBNESS/TINGLING RIGHT LEG    Patient directed to call 911 or go to their nearest emergency room.     Patient verbalized understanding: [x] Yes  [] No  If no, why?    Additional notes:  THE PATIENT STATED SHE HAS NUMBNESS AND TINGLING IN HER RIGHT LEG THAT STARTED 07/26/2021. IT STARTED ABOVE HER ANKLE IN HER SHIN AND NOW HAS SPREAD TO HER WHOLE LEG ON 08/01/2021. THE PATIENT STATED SHE WILL INFORM  AND GO TO [facility].

## 2021-08-02 NOTE — ED PROVIDER NOTES
Time: 5:03 PM EDT  Arrived by: private vehicle  Chief Complaint:   Chief Complaint   Patient presents with   • Numbness     History provided by: patient  History is limited by: N/A     History of Present Illness:  Patient is a 31 y.o. year old female that presents to the emergency department with numbness and tingling that started a week ago in both legs. Pt  and mother are at bedside. Pt has been having waxing and waning numbness but worsening today where it's been constant in the right leg. Pt describes it as paresthesia.    Pt has a history of scoliosis and has had tingling/numbness with that, but it isn't like her current symptoms. Pt is not on any medications. Pt has had a DNC and .     Neurologic Problem  The patient's primary symptoms include focal sensory loss. The patient's pertinent negatives include no syncope. This is a new problem. The current episode started 1 to 4 weeks ago. The neurological problem developed gradually. The problem has been gradually worsening since onset. There was lower extremity focality noted. Pertinent negatives include no back pain, chest pain, fever, neck pain, shortness of breath or vomiting. Past treatments include nothing.       Similar Symptoms Previously: no  Recently seen: no      Patient Care Team  Primary Care Provider: Giuseppe Luevano    Past Medical History:     No Known Allergies  Past Medical History:   Diagnosis Date   • Ovarian cyst    • Scoliosis      Past Surgical History:   Procedure Laterality Date   •  SECTION     • DILATATION AND CURETTAGE       History reviewed. No pertinent family history.    Home Medications:  Prior to Admission medications    Medication Sig Start Date End Date Taking? Authorizing Provider   baclofen (LIORESAL) 10 MG tablet Take 1 tablet by mouth 2 (Two) Times a Day. Can break into 1/2 if too sedating 19   Gabby Miranda APRN   dicyclomine (BENTYL) 10 MG capsule Take 10 mg by mouth 4 (Four) Times a Day Before  "Meals & at Bedtime.    Provider, MD Jean   ibuprofen (ADVIL,MOTRIN) 100 MG tablet Take 100 mg by mouth.    Provider, MD Jean   naproxen (NAPROSYN) 500 MG tablet Take 1 tablet by mouth 2 (Two) Times a Day With Meals. 11/16/19   Gabby Miranda APRN        Social History:   Social History     Tobacco Use   • Smoking status: Never Smoker   • Smokeless tobacco: Never Used   Substance Use Topics   • Alcohol use: No   • Drug use: No         Review of Systems:  Review of Systems   Constitutional: Negative for chills and fever.   HENT: Negative for nosebleeds.    Eyes: Negative for photophobia, redness and visual disturbance.   Respiratory: Negative for cough and shortness of breath.    Cardiovascular: Negative for chest pain.   Gastrointestinal: Negative for diarrhea and vomiting.   Genitourinary: Negative for dysuria and frequency.   Musculoskeletal: Negative for back pain and neck pain.   Skin: Negative for rash.   Neurological: Positive for numbness. Negative for seizures and syncope.        Physical Exam:  /74   Pulse 79   Temp 98.2 °F (36.8 °C) (Oral)   Resp 18   Ht 175.3 cm (69\")   Wt 68.6 kg (151 lb 3.8 oz)   LMP  (LMP Unknown)   SpO2 96%   BMI 22.33 kg/m²     Physical Exam  Vitals and nursing note reviewed.   Constitutional:       General: She is not in acute distress.     Appearance: Normal appearance. She is not ill-appearing.   HENT:      Head: Normocephalic and atraumatic.      Nose: Nose normal.      Mouth/Throat:      Mouth: Mucous membranes are moist.      Pharynx: Oropharynx is clear.   Eyes:      General: Lids are normal. No scleral icterus.     Extraocular Movements: Extraocular movements intact.      Conjunctiva/sclera: Conjunctivae normal.      Pupils: Pupils are equal, round, and reactive to light.      Right eye: Pupil is round and reactive.      Left eye: Pupil is round and reactive.   Neck:      Vascular: Carotid bruit (right) present.      Comments: No carotid bruit on " the left.   Cardiovascular:      Rate and Rhythm: Regular rhythm. Tachycardia present.      Heart sounds: Normal heart sounds. No murmur heard.     Pulmonary:      Effort: Pulmonary effort is normal. No respiratory distress.      Breath sounds: Normal breath sounds and air entry. No decreased air movement. No decreased breath sounds, wheezing, rhonchi or rales.      Comments: Lungs are clear bilaterally.   Chest:      Chest wall: No tenderness.   Abdominal:      Palpations: Abdomen is soft.      Tenderness: There is no abdominal tenderness. There is no guarding or rebound.      Comments: No rigidity.   Musculoskeletal:         General: No tenderness. Normal range of motion.      Cervical back: Normal range of motion and neck supple. No tenderness.      Right lower leg: No edema.      Left lower leg: No edema.   Skin:     General: Skin is warm and dry.      Findings: No rash.   Neurological:      General: No focal deficit present.      Mental Status: She is alert and oriented to person, place, and time.      Cranial Nerves: Cranial nerves are intact. No dysarthria.      Sensory: Sensory deficit present.      Motor: Motor function is intact. No weakness or pronator drift.      Coordination: Coordination is intact. Finger-Nose-Finger Test normal.      Deep Tendon Reflexes:      Reflex Scores:       Tricep reflexes are 2+ on the right side and 2+ on the left side.       Bicep reflexes are 2+ on the right side and 2+ on the left side.       Brachioradialis reflexes are 2+ on the right side and 2+ on the left side.       Patellar reflexes are 2+ on the right side and 2+ on the left side.       Achilles reflexes are 2+ on the right side and 2+ on the left side.     Comments: No aphasia. Decreased pinpoint sensation in left lower extremity.    Psychiatric:         Mood and Affect: Mood normal.         Behavior: Behavior normal.                Medications in the Emergency Department:  Medications   sodium chloride 0.9 %  flush 10 mL (has no administration in time range)   iopamidol (ISOVUE-370) 76 % injection 100 mL (100 mL Intravenous Given 8/2/21 2034)        Labs  Lab Results (last 24 hours)     Procedure Component Value Units Date/Time    aPTT [[id number]]  (Normal) Collected: 08/02/21 1859    Specimen: Blood Updated: 08/02/21 1920     PTT 25.3 seconds     Protime-INR [[id number]]  (Abnormal) Collected: 08/02/21 1859    Specimen: Blood Updated: 08/02/21 1920     Protime 11.3 Seconds      INR 1.04    Narrative:      Suggested Therapeutic Ranges For Oral Anticoagulant Therapy:  Level of Therapy                      INR Target Range  Standard Dose                            2.0-3.0  High Dose                                2.5-3.5  Patients not receiving anticoagulant  Therapy Normal Range                     0.6-1.2    CBC & Differential [[id number]]  (Abnormal) Collected: 08/02/21 1859    Specimen: Blood Updated: 08/02/21 1907    Narrative:      The following orders were created for panel order CBC & Differential.  Procedure                               Abnormality         Status                     ---------                               -----------         ------                     CBC Auto Differential[[id number]]        Abnormal            Final result                 Please view results for these tests on the individual orders.    Comprehensive Metabolic Panel [[id number]]  (Abnormal) Collected: 08/02/21 1859    Specimen: Blood Updated: 08/02/21 1933     Glucose 100 mg/dL      BUN 11 mg/dL      Creatinine 0.82 mg/dL      Sodium 139 mmol/L      Potassium 3.9 mmol/L      Chloride 104 mmol/L      CO2 23.3 mmol/L      Calcium 9.3 mg/dL      Total Protein 6.9 g/dL      Albumin 4.60 g/dL      ALT (SGPT) 11 U/L      AST (SGOT) 17 U/L      Alkaline Phosphatase 57 U/L      Total Bilirubin 0.4 mg/dL      eGFR Non African Amer 82 mL/min/1.73      Globulin 2.3 gm/dL      A/G Ratio 2.0 g/dL      BUN/Creatinine Ratio 13.4     Anion Gap 11.7  mmol/L     Narrative:      GFR Normal >60  Chronic Kidney Disease <60  Kidney Failure <15      hCG, Quantitative, Pregnancy [[id number]] Collected: 08/02/21 1859    Specimen: Blood Updated: 08/02/21 1930     HCG Quantitative <0.50 mIU/mL     Narrative:      HCG Ranges by Gestational Age    Females - non-pregnant premenopausal   </= 1mIU/mL HCG  Females - postmenopausal               </= 7mIU/mL HCG    3 Weeks       5.4   -      72 mIU/mL  4 Weeks      10.2   -     708 mIU/mL  5 Weeks       217   -   8,245 mIU/mL  6 Weeks       152   -  32,177 mIU/mL  7 Weeks     4,059   - 153,767 mIU/mL  8 Weeks    31,366   - 149,094 mIU/mL  9 Weeks    59,109   - 135,901 mIU/mL  10 Weeks   44,186   - 170,409 mIU/mL  12 Weeks   27,107   - 201,615 mIU/mL  14 Weeks   24,302   -  93,646 mIU/mL  15 Weeks   12,540   -  69,747 mIU/mL  16 Weeks    8,904   -  55,332 mIU/mL  17 Weeks    8,240   -  51,793 mIU/mL  18 Weeks    9,649   -  55,271 mIU/mL    Results may be falsely decreased if patient taking Biotin.      T4, Free [[id number]]  (Normal) Collected: 08/02/21 1859    Specimen: Blood Updated: 08/02/21 1938     Free T4 1.27 ng/dL     Narrative:      Results may be falsely increased if patient taking Biotin.      TSH [[id number]]  (Normal) Collected: 08/02/21 1859    Specimen: Blood Updated: 08/02/21 1938     TSH 1.460 uIU/mL     Magnesium [[id number]]  (Normal) Collected: 08/02/21 1859    Specimen: Blood Updated: 08/02/21 1933     Magnesium 2.0 mg/dL     CBC Auto Differential [[id number]]  (Abnormal) Collected: 08/02/21 1859    Specimen: Blood Updated: 08/02/21 1907     WBC 8.40 10*3/mm3      RBC 4.51 10*6/mm3      Hemoglobin 13.5 g/dL      Hematocrit 41.3 %      MCV 91.6 fL      MCH 29.9 pg      MCHC 32.7 g/dL      RDW 12.1 %      RDW-SD 40.7 fl      MPV 9.5 fL      Platelets 299 10*3/mm3      Neutrophil % 74.6 %      Lymphocyte % 21.2 %      Monocyte % 3.3 %      Eosinophil % 0.2 %      Basophil % 0.6 %      Immature Grans % 0.1 %       Neutrophils, Absolute 6.26 10*3/mm3      Lymphocytes, Absolute 1.78 10*3/mm3      Monocytes, Absolute 0.28 10*3/mm3      Eosinophils, Absolute 0.02 10*3/mm3      Basophils, Absolute 0.05 10*3/mm3      Immature Grans, Absolute 0.01 10*3/mm3      nRBC 0.0 /100 WBC            Imaging:  CT Head Without Contrast    Result Date: 8/2/2021  PROCEDURE: CT HEAD WO CONTRAST  COMPARISON: None.  INDICATIONS: trauma  PROTOCOL:   Standard imaging protocol performed    RADIATION:   DLP: 891.1 mGy*cm   MA and/or KV was adjusted to minimize radiation dose.    TECHNIQUE: After obtaining the patient's consent, CT images were obtained without non-ionic intravenous contrast material.  DISCUSSION:   A routine nonenhanced head CT was performed.  No acute brain abnormality is identified.  No acute intracranial hemorrhage.  No acute infarct is seen.  No acute skull fracture.  No midline shift or acute intracranial mass effect is seen.  The ventricular size and configuration are within normal limits.  CONCLUSION:   No acute brain abnormality is seen. Specifically, no acute intracranial hemorrhage, no acute infarct, no intracranial mass lesion, and no acute intracranial mass effect are appreciated.    PATI CLARK JR, MD       Electronically Signed and Approved By: PATI CLARK JR, MD on 8/02/2021 at 21:17             CT Angiogram Neck    Result Date: 8/2/2021  PROCEDURE: CT ANGIOGRAM NECK  COMPARISON: None.  INDICATIONS: RIGHT CAROTID BRUIT.  PROTOCOL:   Standard imaging protocol performed    RADIATION:   DLP: 474.4 mGy*cm   Automated exposure control was utilized to minimize radiation dose. CONTRAST: 100 cc Isovue 370 I.V. LABS:   eGFR: >60 ml/min/1.73m2  TECHNIQUE: After obtaining the patient's consent, 653 CT/CTA images of the neck were obtained with non-ionic intravenous contrast material. Multi-planar reformatted/3-D images were created to optimize visualization of vascular anatomy. Unless otherwise stated in this report, all  vascular stenoses involving the internal carotid arteries reported for this examination are derived by dividing the lesion diameter by the diameter of the normal internal carotid artery more distally (that is, using NASCET criteria).  Motion artifact obscures detail on the study.  FINDINGS:  LEFT INTERNAL CAROTID: No hemodynamically significant stenosis or dissection.  EXTERNAL CAROTID: No hemodynamically significant stenosis or dissection.  COMMON CAROTID: No hemodynamically significant stenosis or dissection.  VERTEBRAL: No hemodynamically significant stenosis or dissection.   RIGHT INTERNAL CAROTID: No hemodynamically significant stenosis or dissection.  EXTERNAL CAROTID: No hemodynamically significant stenosis or dissection.  COMMON CAROTID: No hemodynamically significant stenosis or dissection.  VERTEBRAL: No hemodynamically significant stenosis or dissection.   CONCLUSION: No hemodynamically significant stenoses are seen.  No emergent large vessel occlusion is identified.  No definite acute arterial injury is appreciated by CTA technique.  Motion artifact obscures detail on the study.    PATI CLARK JR, MD       Electronically Signed and Approved By: PATI CLARK JR, MD on 8/02/2021 at 21:32             MRI Brain Without Contrast    Result Date: 8/2/2021  PROCEDURE: MRI BRAIN WO CONTRAST  COMPARISONS: [facility], CT, CT ANGIOGRAM NECK, 8/02/2021, 20:38.   [facility], CT, CT HEAD WO CONTRAST, 8/02/2021, 20:34.  INDICATIONS: evaluate for stroke  TECHNIQUE: A variety of imaging planes and parameters were utilized for visualization of suspected pathology within the brain.  257 magnetic resonance (MR) images were obtained without contrast.  FINDINGS:  CEREBRUM: No edema, hemorrhage, mass, acute infarction, or inappropriate atrophy.  CEREBELLUM: No edema, hemorrhage, mass, acute infarction, or inappropriate atrophy.  BRAINSTEM: No edema, hemorrhage, mass, acute infarction, or  inappropriate atrophy.  CSF SPACES: Ventricles, cisterns, and sulci are appropriate for age.  No hydrocephalus, subarachnoid hemorrhage, or mass.  SKULL: No mass or other significant visible lesion.  SINUSES: Limited views demonstrate no significant mucosal thickening or fluid.  ORBITS: Limited views are unremarkable.  OTHER: Negative.   CONCLUSION: No acute findings are seen.  No acute infarct is identified.  No acute intracranial hemorrhage.  No midline shift or acute intracranial herniation syndrome is appreciated.  No acute brain abnormality is seen.      PATI CLARK JR, MD       Electronically Signed and Approved By: PATI CLARK JR, MD on 8/02/2021 at 23:04               Procedures:  Procedures    Progress  ED Course as of Aug 03 0021   Mon Aug 02, 2021   1903 EKG:    Rhythm: Sinus rhythm  Rate: 94  Intervals: Normal [place] and QT intervals  RSR prime in V1 and V2  T-wave: Nonspecific T wave flattening in V2  ST Segment: There is nonspecific ST depression in V4, V5, V6, II, III, and  aVF    EKG Comparison: EKG change from July 17, 2019, nonspecific changes    Interpreted by me        [SD]   4239 FINDINGS:          LEFT  INTERNAL CAROTID:   No hemodynamically significant stenosis or dissection.    EXTERNAL CAROTID:  No hemodynamically significant stenosis or dissection.    COMMON CAROTID:    No hemodynamically significant stenosis or dissection.    VERTEBRAL:    No hemodynamically significant stenosis or dissection.       RIGHT  INTERNAL CAROTID:   No hemodynamically significant stenosis or dissection.    EXTERNAL CAROTID:  No hemodynamically significant stenosis or dissection.    COMMON CAROTID:    No hemodynamically significant stenosis or dissection.    VERTEBRAL:    No hemodynamically significant stenosis or dissection.       CONCLUSION: No hemodynamically significant stenoses are seen.  No emergent large vessel occlusion   is identified.  No definite acute arterial injury is appreciated by CTA technique.   Motion artifact   obscures detail on the study.      CT Angiogram Neck [JW]   2214 DISCUSSION:   A routine nonenhanced head CT was performed.  No acute brain abnormality is   identified.  No acute intracranial hemorrhage.  No acute infarct is seen.  No acute skull fracture.    No midline shift or acute intracranial mass effect is seen.  The ventricular size and   configuration are within normal limits.      CONCLUSION:              No acute brain abnormality is seen. Specifically, no acute intracranial hemorrhage,   no acute infarct, no intracranial mass lesion, and no acute intracranial mass effect are   appreciated.         CT Head Without Contrast [JW]   2317 FINDINGS:          CEREBRUM:     No edema, hemorrhage, mass, acute infarction, or inappropriate atrophy.    CEREBELLUM: No edema, hemorrhage, mass, acute infarction, or inappropriate atrophy.    BRAINSTEM:    No edema, hemorrhage, mass, acute infarction, or inappropriate atrophy.    CSF SPACES:   Ventricles, cisterns, and sulci are appropriate for age.  No hydrocephalus,   subarachnoid hemorrhage, or mass.    SKULL: No mass or other significant visible lesion.    SINUSES:          Limited views demonstrate no significant mucosal thickening or fluid.    ORBITS:            Limited views are unremarkable.    OTHER:             Negative.       CONCLUSION: No acute findings are seen.  No acute infarct is identified.  No acute intracranial   hemorrhage.  No midline shift or acute intracranial herniation syndrome is appreciated.  No acute   brain abnormality is seen.   MRI Brain Without Contrast [JW]      ED Course User Index  [JW] Juliana Rodriguez  [SD] Harsh Cullen DO                         NIHSS (NIH Stroke Scale/Score) reviewed and/or performed as part of the patient evaluation and treatment planning process.  The result associated with this review/performance is: 1        Medical Decision Making:  MDM  Number of Diagnoses or Management  Options  Paresthesia of right leg  Diagnosis management comments: At the time of discharge, the patient appears well in no distress and nontoxic.  The patient was resting comfortably.  The patient had no measurable neurological deficits in the emergency department.  The patient did have slightly decreased pinprick sensation subjectively on the right lower extremity.  The patient had good deep tendon reflexes bilaterally of the lower extremities.  The patient also had good musculoskeletal strength.  The patient had no progressive symptoms in the emergency department.  The patient had a normal CT, CTA of the neck as well as MRI of the brain.  The patient was able to ambulate in the emergency department without difficulty.  The case was discussed with Dr. Hill, neurologist on-call.  He feels the patient can be discharged home and he would like them to call his office tomorrow for an appointment.  The patient felt comfortable to go home and comfortable with the follow-up plan.  The patient will return for any progressive neurological symptoms I have discussed this with her in detail.  The possible bruit in the carotid artery is felt may be to be referred pain from a murmur.  I will have the patient follow-up with her doctor discussed the need for an echocardiogram.  I have discussed this with her in detail as well.       Amount and/or Complexity of Data Reviewed  Clinical lab tests: reviewed  Tests in the radiology section of CPT®: reviewed  Tests in the medicine section of CPT®: reviewed         Final diagnoses:   Paresthesia of right leg        Disposition:  ED Disposition     ED Disposition Condition Comment    Discharge Stable           Documentation assistance provided by Juliana Rodriguez acting as scribe for Harsh Cullen DO. Information recorded by the scribe was done at my direction and has been verified and validated by me.          Juliana Rodriguez  08/02/21 4227       Juliana Rodriguez  08/02/21  1730       Harsh Cullen DO  08/04/21 0153

## 2021-08-03 ENCOUNTER — TELEPHONE (OUTPATIENT)
Dept: FAMILY MEDICINE CLINIC | Facility: CLINIC | Age: 31
End: 2021-08-03

## 2021-08-03 VITALS
TEMPERATURE: 98.2 F | BODY MASS INDEX: 22.4 KG/M2 | DIASTOLIC BLOOD PRESSURE: 74 MMHG | HEIGHT: 69 IN | WEIGHT: 151.24 LBS | HEART RATE: 79 BPM | RESPIRATION RATE: 18 BRPM | SYSTOLIC BLOOD PRESSURE: 119 MMHG | OXYGEN SATURATION: 96 %

## 2021-08-03 NOTE — DISCHARGE INSTRUCTIONS
Please return to the emergency room immediately for worsening numbness, weakness of your arms or legs, difficulties walking, difficulties with speech, difficulty swallowing, change in vision, double vision or any new neurological symptoms

## 2021-08-03 NOTE — TELEPHONE ENCOUNTER
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 PATIENT IS MOST AVAILABLE AFTER 3PM

## 2021-08-05 NOTE — TELEPHONE ENCOUNTER
Caller: Ailyn Bedoya    Relationship to patient: Self    Best call back number: [phone]    Patient is needing:PATIENT CALLED IN AGAIN ABOUT A DIFFERENT HOSPITAL FOLLOW UP APPOINTMENT WITH MONO MORIN. SHE IS AVAILABLE ALL DAY ON 8/11/2021 OR SHE WOULD LIKE AN APPOINTMENT IN THE AFTERNOON AFTER 3 OR EARLY AT 7:30AM IF POSSIBLE. PLEASE CALL PATIENT AND ADVISE.

## 2021-08-06 LAB — QT INTERVAL: 353 MS

## 2021-08-09 ENCOUNTER — OFFICE VISIT (OUTPATIENT)
Dept: FAMILY MEDICINE CLINIC | Facility: CLINIC | Age: 31
End: 2021-08-09

## 2021-08-09 ENCOUNTER — HOSPITAL ENCOUNTER (OUTPATIENT)
Dept: GENERAL RADIOLOGY | Facility: HOSPITAL | Age: 31
Discharge: HOME | End: 2021-08-09
Admitting: NURSE PRACTITIONER

## 2021-08-09 VITALS
OXYGEN SATURATION: 99 % | WEIGHT: 147 LBS | DIASTOLIC BLOOD PRESSURE: 74 MMHG | SYSTOLIC BLOOD PRESSURE: 122 MMHG | BODY MASS INDEX: 21.77 KG/M2 | TEMPERATURE: 98 F | HEART RATE: 66 BPM | HEIGHT: 69 IN

## 2021-08-09 DIAGNOSIS — R68.89 ABNORMAL ANKLE BRACHIAL INDEX: ICD-10-CM

## 2021-08-09 DIAGNOSIS — R20.0 NUMBNESS AND TINGLING OF RIGHT LEG: ICD-10-CM

## 2021-08-09 DIAGNOSIS — R09.89 DIMINISHED PULSES IN LOWER EXTREMITY: ICD-10-CM

## 2021-08-09 DIAGNOSIS — R20.2 NUMBNESS AND TINGLING OF RIGHT LEG: ICD-10-CM

## 2021-08-09 DIAGNOSIS — R20.0 NUMBNESS AND TINGLING OF RIGHT LEG: Primary | ICD-10-CM

## 2021-08-09 DIAGNOSIS — R20.2 NUMBNESS AND TINGLING OF RIGHT LEG: Primary | ICD-10-CM

## 2021-08-09 PROCEDURE — 99213 OFFICE O/P EST LOW 20 MIN: CPT | Performed by: NURSE PRACTITIONER

## 2021-08-09 PROCEDURE — 72100 X-RAY EXAM L-S SPINE 2/3 VWS: CPT

## 2021-08-09 NOTE — PROGRESS NOTES
"Chief Complaint  Follow-up (Paresthesia of right leg)    Subjective          Ailyn Bedoya presents to [facility]  Patient presents to the office today for follow-up regarding a recent emergency department visit.  Patient states that she was seen in the ER on August 2 for bilateral paresthesias in her lower legs.  States that the left leg has resolved but she continues to have numbness and tingling in the right leg.  She does have a history of scoliosis.  She states that she does have a history of low back pain as well.  Patient states that she has noticed some mild swelling of the right lower leg.  Denies any erythema of the calf.  States that the paresthesias can occur while she is pain in bed or it occurs after standing for extended periods.      Objective   Vital Signs:   /74 (BP Location: Left arm, Patient Position: Sitting, Cuff Size: Adult)   Pulse 66   Temp 98 °F (36.7 °C) (Temporal)   Ht 175.3 cm (69\")   Wt 66.7 kg (147 lb)   SpO2 99%   BMI 21.71 kg/m²     Physical Exam  Vitals reviewed.   Constitutional:       Appearance: Normal appearance.   Neck:      Vascular: Normal carotid pulses. No carotid bruit or JVD.   Cardiovascular:      Rate and Rhythm: Normal rate and regular rhythm.      Pulses:           Dorsalis pedis pulses are 1+ on the right side.        Posterior tibial pulses are 1+ on the right side.      Heart sounds: Normal heart sounds, S1 normal and S2 normal. No murmur heard.     Pulmonary:      Effort: Pulmonary effort is normal. No respiratory distress.      Breath sounds: Normal breath sounds.   Musculoskeletal:      Cervical back: Full passive range of motion without pain and normal range of motion.      Lumbar back: Normal.      Right foot: Decreased capillary refill. No swelling.      Comments: PHILLIP 0.91 on right   Feet:      Right foot:      Skin integrity: Skin integrity normal.      Comments: Delayed cap refill on right foot    Skin:     " General: Skin is warm and dry.   Neurological:      Mental Status: She is alert and oriented to person, place, and time.   Psychiatric:         Attention and Perception: Attention normal.         Mood and Affect: Mood normal.         Behavior: Behavior normal.        Result Review :                Assessment and Plan    Diagnoses and all orders for this visit:    1. Numbness and tingling of right leg (Primary)  -     XR Spine Lumbar 2 or 3 View; Future  -     US Segmental Limbs Lower Arterial; Future    2. Diminished pulses in lower extremity  -     US Segmental Limbs Lower Arterial; Future    3. Abnormal ankle brachial index  -     US Segmental Limbs Lower Arterial; Future        Follow Up   Return if symptoms worsen or fail to improve.  Patient was given instructions and counseling regarding her condition or for health maintenance advice. Please see specific information pulled into the AVS if appropriate.

## 2021-08-10 ENCOUNTER — TELEPHONE (OUTPATIENT)
Dept: FAMILY MEDICINE CLINIC | Facility: CLINIC | Age: 31
End: 2021-08-10

## 2021-08-10 NOTE — TELEPHONE ENCOUNTER
Caller: Ailyn Bedoya    Relationship: Self    Best call back number: [phone]     What is the best time to reach you: ANY    What was the call regarding: PT CALLED ABOUT THE ULTRA SOUND ON HER LEGS, IS REQUESTING THIS BE SCHEDULED AT THE EARLIEST CONVENIENCE.  THANK YOU.    Do you require a callback: YES

## 2021-08-13 ENCOUNTER — TELEPHONE (OUTPATIENT)
Dept: FAMILY MEDICINE CLINIC | Facility: CLINIC | Age: 31
End: 2021-08-13

## 2021-08-13 DIAGNOSIS — R68.89 ABNORMAL ANKLE BRACHIAL INDEX (ABI): ICD-10-CM

## 2021-08-13 DIAGNOSIS — R20.2 NUMBNESS AND TINGLING OF RIGHT LEG: Primary | ICD-10-CM

## 2021-08-13 DIAGNOSIS — R20.0 NUMBNESS AND TINGLING OF RIGHT LEG: Primary | ICD-10-CM

## 2021-08-13 DIAGNOSIS — R09.89 DIMINISHED PULSE: ICD-10-CM

## 2021-08-13 NOTE — TELEPHONE ENCOUNTER
Caller: Alesha Bedoyagwen MILLIGAN    Relationship: Self    Best call back number: [phone]    What was the call regarding: PATIENT CALLED STATING THAT SHE HAD AN ORDER FOR AN ULTRASOUND BUT WAS TOLD THAT THE ORDER WAS INCORRECT. SHE STATED THE OFFICE SENT IT BACK TO US TO BE FIXED, PATIENT IS VERIFYING THAT WE RECEIVED THIS AND THAT IT WILL BE RESENT WHEN CORRECTED.     IN ADDITION, THE PATIENT STATED THAT THE LAST TIME SHE WAS SEEN IN THE OFFICE, SHE AND BROWN SANTOS HAD DISCUSSED THE POSSIBILITY OF GOING TO A NEUROLOGIST. SHE STATED THAT SHE BELIEVED HE MAY HAVE BEEN LEANING TOWARD OTHER OPTIONS FOR TREATMENT. SHE WAS CURIOUS IF SHE SHOULD KEEP HER NEURO APPOINTMENT FOR NEXT WEEK OR IF BROWN HAD OTHER SUGGESTIONS FOR HER CARE. PLEASE ADVISE       Do you require a callback: YES

## 2021-08-18 ENCOUNTER — LAB (OUTPATIENT)
Dept: LAB | Facility: HOSPITAL | Age: 31
End: 2021-08-18

## 2021-08-18 ENCOUNTER — TRANSCRIBE ORDERS (OUTPATIENT)
Dept: ADMINISTRATIVE | Facility: HOSPITAL | Age: 31
End: 2021-08-18

## 2021-08-18 DIAGNOSIS — Q74.2 UNSPECIFIED CONGENITAL ANOMALY OF LOWER LIMB: Primary | ICD-10-CM

## 2021-08-18 DIAGNOSIS — Q74.2: ICD-10-CM

## 2021-08-18 DIAGNOSIS — Q74.2: Primary | ICD-10-CM

## 2021-08-18 LAB
25(OH)D3 SERPL-MCNC: 20.7 NG/ML
ALBUMIN SERPL-MCNC: 4.7 G/DL (ref 3.5–5.2)
ALBUMIN/GLOB SERPL: 2.1 G/DL
ALP SERPL-CCNC: 50 U/L (ref 39–117)
ALT SERPL W P-5'-P-CCNC: 6 U/L (ref 1–33)
ANION GAP SERPL CALCULATED.3IONS-SCNC: 9.6 MMOL/L (ref 5–15)
AST SERPL-CCNC: 12 U/L (ref 1–32)
BASOPHILS # BLD AUTO: 0.07 10*3/MM3 (ref 0–0.2)
BASOPHILS NFR BLD AUTO: 1 % (ref 0–1.5)
BILIRUB SERPL-MCNC: 0.7 MG/DL (ref 0–1.2)
BUN SERPL-MCNC: 9 MG/DL (ref 6–20)
BUN/CREAT SERPL: 12.3 (ref 7–25)
CALCIUM SPEC-SCNC: 9.2 MG/DL (ref 8.6–10.5)
CEA SERPL-MCNC: 0.63 NG/ML
CHLORIDE SERPL-SCNC: 102 MMOL/L (ref 98–107)
CHOLEST SERPL-MCNC: 157 MG/DL (ref 0–200)
CO2 SERPL-SCNC: 27.4 MMOL/L (ref 22–29)
CREAT SERPL-MCNC: 0.73 MG/DL (ref 0.57–1)
DEPRECATED RDW RBC AUTO: 38.2 FL (ref 37–54)
EOSINOPHIL # BLD AUTO: 0.04 10*3/MM3 (ref 0–0.4)
EOSINOPHIL NFR BLD AUTO: 0.5 % (ref 0.3–6.2)
ERYTHROCYTE [DISTWIDTH] IN BLOOD BY AUTOMATED COUNT: 11.3 % (ref 12.3–15.4)
FOLATE SERPL-MCNC: 6.77 NG/ML (ref 4.78–24.2)
GFR SERPL CREATININE-BSD FRML MDRD: 94 ML/MIN/1.73
GLOBULIN UR ELPH-MCNC: 2.2 GM/DL
GLUCOSE BLD-MCNC: 86 MG/DL (ref 74–155)
GLUCOSE SERPL-MCNC: 83 MG/DL (ref 65–99)
HBA1C MFR BLD: 5.03 % (ref 4.8–5.6)
HCT VFR BLD AUTO: 42.9 % (ref 34–46.6)
HDLC SERPL-MCNC: 54 MG/DL (ref 40–60)
HGB BLD-MCNC: 13.5 G/DL (ref 12–15.9)
IMM GRANULOCYTES # BLD AUTO: 0.01 10*3/MM3 (ref 0–0.05)
IMM GRANULOCYTES NFR BLD AUTO: 0.1 % (ref 0–0.5)
LDLC SERPL CALC-MCNC: 94 MG/DL (ref 0–100)
LDLC/HDLC SERPL: 1.75 {RATIO}
LYMPHOCYTES # BLD AUTO: 1.88 10*3/MM3 (ref 0.7–3.1)
LYMPHOCYTES NFR BLD AUTO: 25.6 % (ref 19.6–45.3)
MCH RBC QN AUTO: 29.3 PG (ref 26.6–33)
MCHC RBC AUTO-ENTMCNC: 31.5 G/DL (ref 31.5–35.7)
MCV RBC AUTO: 93.3 FL (ref 79–97)
MONOCYTES # BLD AUTO: 0.34 10*3/MM3 (ref 0.1–0.9)
MONOCYTES NFR BLD AUTO: 4.6 % (ref 5–12)
NEUTROPHILS NFR BLD AUTO: 4.99 10*3/MM3 (ref 1.7–7)
NEUTROPHILS NFR BLD AUTO: 68.2 % (ref 42.7–76)
NRBC BLD AUTO-RTO: 0 /100 WBC (ref 0–0.2)
PLATELET # BLD AUTO: 269 10*3/MM3 (ref 140–450)
PMV BLD AUTO: 10.5 FL (ref 6–12)
POTASSIUM SERPL-SCNC: 3.9 MMOL/L (ref 3.5–5.2)
PROT SERPL-MCNC: 6.9 G/DL (ref 6–8.5)
RBC # BLD AUTO: 4.6 10*6/MM3 (ref 3.77–5.28)
SODIUM SERPL-SCNC: 139 MMOL/L (ref 136–145)
T4 FREE SERPL-MCNC: 1.13 NG/DL (ref 0.93–1.7)
TRIGL SERPL-MCNC: 43 MG/DL (ref 0–150)
TSH SERPL DL<=0.05 MIU/L-ACNC: 1.08 UIU/ML (ref 0.27–4.2)
VIT B12 BLD-MCNC: 470 PG/ML (ref 211–946)
VLDLC SERPL-MCNC: 9 MG/DL (ref 5–40)
WBC # BLD AUTO: 7.33 10*3/MM3 (ref 3.4–10.8)

## 2021-08-18 PROCEDURE — 82947 ASSAY GLUCOSE BLOOD QUANT: CPT

## 2021-08-18 PROCEDURE — 82607 VITAMIN B-12: CPT

## 2021-08-18 PROCEDURE — 36415 COLL VENOUS BLD VENIPUNCTURE: CPT

## 2021-08-18 PROCEDURE — 86038 ANTINUCLEAR ANTIBODIES: CPT

## 2021-08-18 PROCEDURE — 83036 HEMOGLOBIN GLYCOSYLATED A1C: CPT

## 2021-08-18 PROCEDURE — 84443 ASSAY THYROID STIM HORMONE: CPT

## 2021-08-18 PROCEDURE — 80053 COMPREHEN METABOLIC PANEL: CPT

## 2021-08-18 PROCEDURE — 86618 LYME DISEASE ANTIBODY: CPT

## 2021-08-18 PROCEDURE — 83825 ASSAY OF MERCURY: CPT

## 2021-08-18 PROCEDURE — 80061 LIPID PANEL: CPT

## 2021-08-18 PROCEDURE — 82306 VITAMIN D 25 HYDROXY: CPT

## 2021-08-18 PROCEDURE — 84439 ASSAY OF FREE THYROXINE: CPT

## 2021-08-18 PROCEDURE — 82746 ASSAY OF FOLIC ACID SERUM: CPT

## 2021-08-18 PROCEDURE — 82175 ASSAY OF ARSENIC: CPT

## 2021-08-18 PROCEDURE — 83655 ASSAY OF LEAD: CPT

## 2021-08-18 PROCEDURE — 82378 CARCINOEMBRYONIC ANTIGEN: CPT

## 2021-08-18 PROCEDURE — 82300 ASSAY OF CADMIUM: CPT

## 2021-08-18 PROCEDURE — 85025 COMPLETE CBC W/AUTO DIFF WBC: CPT

## 2021-08-19 LAB — B BURGDOR IGG+IGM SER-ACNC: <0.91 ISR (ref 0–0.9)

## 2021-08-20 LAB
ANA TITR SER IF: NEGATIVE {TITER}
ARSENIC BLD-MCNC: 2 UG/L (ref 2–23)
CADMIUM BLD-MCNC: <0.5 UG/L (ref 0–1.2)
LEAD BLDV-MCNC: <1 UG/DL (ref 0–4)
MERCURY BLD-MCNC: <1 UG/L (ref 0–14.9)

## 2021-08-23 ENCOUNTER — HOSPITAL ENCOUNTER (OUTPATIENT)
Dept: CARDIOLOGY | Facility: HOSPITAL | Age: 31
Discharge: HOME | End: 2021-08-23
Admitting: NURSE PRACTITIONER

## 2021-08-23 ENCOUNTER — APPOINTMENT (OUTPATIENT)
Dept: CARDIOLOGY | Facility: HOSPITAL | Age: 31
End: 2021-08-23

## 2021-08-23 DIAGNOSIS — R68.89 ABNORMAL ANKLE BRACHIAL INDEX (ABI): ICD-10-CM

## 2021-08-23 DIAGNOSIS — R09.89 DIMINISHED PULSE: ICD-10-CM

## 2021-08-23 DIAGNOSIS — R20.2 NUMBNESS AND TINGLING OF RIGHT LEG: ICD-10-CM

## 2021-08-23 DIAGNOSIS — R20.0 NUMBNESS AND TINGLING OF RIGHT LEG: ICD-10-CM

## 2021-08-23 LAB
BH CV LOWER ARTERIAL LEFT ABI RATIO: 1.13
BH CV LOWER ARTERIAL LEFT DORSALIS PEDIS SYS MAX: 136 MMHG
BH CV LOWER ARTERIAL LEFT GREAT TOE SYS MAX: 75 MMHG
BH CV LOWER ARTERIAL LEFT LOW THIGH SYS MAX: 116 MMHG
BH CV LOWER ARTERIAL LEFT POPLITEAL SYS MAX: 126 MMHG
BH CV LOWER ARTERIAL LEFT POST TIBIAL SYS MAX: 118 MMHG
BH CV LOWER ARTERIAL LEFT TBI RATIO: 0.63
BH CV LOWER ARTERIAL RIGHT ABI RATIO: 1.18
BH CV LOWER ARTERIAL RIGHT DORSALIS PEDIS SYS MAX: 141 MMHG
BH CV LOWER ARTERIAL RIGHT GREAT TOE SYS MAX: 65 MMHG
BH CV LOWER ARTERIAL RIGHT LOW THIGH SYS MAX: 121 MMHG
BH CV LOWER ARTERIAL RIGHT POPLITEAL SYS MAX: 129 MMHG
BH CV LOWER ARTERIAL RIGHT POST TIBIAL SYS MAX: 128 MMHG
BH CV LOWER ARTERIAL RIGHT TBI RATIO: 0.54
MAXIMAL PREDICTED HEART RATE: 191 BPM
STRESS TARGET HR: 162 BPM
UPPER ARTERIAL LEFT ARM BRACHIAL SYS MAX: 116 MMHG
UPPER ARTERIAL RIGHT ARM BRACHIAL SYS MAX: 120 MMHG

## 2021-08-23 PROCEDURE — 93923 UPR/LXTR ART STDY 3+ LVLS: CPT | Performed by: SURGERY

## 2021-08-23 PROCEDURE — 93923 UPR/LXTR ART STDY 3+ LVLS: CPT

## 2021-08-24 ENCOUNTER — TELEPHONE (OUTPATIENT)
Dept: FAMILY MEDICINE CLINIC | Facility: CLINIC | Age: 31
End: 2021-08-24

## 2021-08-24 NOTE — TELEPHONE ENCOUNTER
Caller: Ailyn Bedoya    Relationship: Self    Best call back number: [phone]     What is the best time to reach you: ANY    Who are you requesting to speak with (clinical staff, provider,  specific staff member): PROVIDER    What was the call regarding: PATIENT STATED SHE HAD GOTTEN HER TEST RESULTS BACK IN [facility] AND HAD SOME QUESTIONS REGARDING THOSE RESULTS. PLEASE CALL BACK TO DISCUSS     Do you require a callback: YES

## 2021-09-15 ENCOUNTER — OFFICE VISIT (OUTPATIENT)
Dept: FAMILY MEDICINE CLINIC | Facility: CLINIC | Age: 31
End: 2021-09-15

## 2021-09-15 VITALS
WEIGHT: 146 LBS | HEART RATE: 93 BPM | BODY MASS INDEX: 21.62 KG/M2 | OXYGEN SATURATION: 100 % | SYSTOLIC BLOOD PRESSURE: 122 MMHG | HEIGHT: 69 IN | TEMPERATURE: 98.3 F | DIASTOLIC BLOOD PRESSURE: 80 MMHG

## 2021-09-15 DIAGNOSIS — R20.2 PARESTHESIA OF RIGHT LEG: Primary | ICD-10-CM

## 2021-09-15 PROCEDURE — 99212 OFFICE O/P EST SF 10 MIN: CPT | Performed by: NURSE PRACTITIONER

## 2021-09-15 NOTE — PROGRESS NOTES
"Chief Complaint  Follow-up (leg ultrasound )    Subjective          Ailyn Bedoya presents to [facility]  She presents to the office today to follow-up regarding her arterial ultrasound on her right leg.  Patient states that she did review the report as well as her lab work.  I did review this with her as well as her lab work which was all unremarkable.  Patient does state that the neurologist had felt that the numbness and tingling was coming from a pinched nerve from crossing her legs.  Patient states that she has consciously thought about her positioning of her legs and she has not had any paresthesias in 3 weeks.      Objective   Vital Signs:   /80   Pulse 93   Temp 98.3 °F (36.8 °C)   Ht 175.3 cm (69\")   Wt 66.2 kg (146 lb)   SpO2 100%   BMI 21.56 kg/m²     Physical Exam  Constitutional:       Appearance: Normal appearance.   Cardiovascular:      Rate and Rhythm: Normal rate and regular rhythm.      Heart sounds: Normal heart sounds, S1 normal and S2 normal. No murmur heard.     Pulmonary:      Effort: Pulmonary effort is normal. No respiratory distress.      Breath sounds: Normal breath sounds.   Skin:     General: Skin is warm and dry.   Neurological:      Mental Status: She is alert and oriented to person, place, and time.   Psychiatric:         Attention and Perception: Attention normal.         Mood and Affect: Mood normal.         Behavior: Behavior normal.        Result Review :                 Assessment and Plan    Diagnoses and all orders for this visit:    1. Paresthesia of right leg (Primary)        Follow Up   Return for Next scheduled follow up.  Patient was given instructions and counseling regarding her condition or for health maintenance advice. Please see specific information pulled into the AVS if appropriate.       "

## 2021-09-21 ENCOUNTER — INITIAL PRENATAL (OUTPATIENT)
Dept: OBSTETRICS AND GYNECOLOGY | Facility: CLINIC | Age: 31
End: 2021-09-21

## 2021-09-21 VITALS — DIASTOLIC BLOOD PRESSURE: 70 MMHG | SYSTOLIC BLOOD PRESSURE: 108 MMHG | WEIGHT: 151 LBS | BODY MASS INDEX: 22.3 KG/M2

## 2021-09-21 DIAGNOSIS — K58.9 IRRITABLE BOWEL SYNDROME, UNSPECIFIED TYPE: ICD-10-CM

## 2021-09-21 DIAGNOSIS — Z34.80 SUPERVISION OF OTHER NORMAL PREGNANCY, ANTEPARTUM: Primary | ICD-10-CM

## 2021-09-21 DIAGNOSIS — Z32.01 PREGNANCY TEST PERFORMED, PREGNANCY CONFIRMED: ICD-10-CM

## 2021-09-21 DIAGNOSIS — Z98.891 H/O: C-SECTION: ICD-10-CM

## 2021-09-21 DIAGNOSIS — R11.2 NON-INTRACTABLE VOMITING WITH NAUSEA, UNSPECIFIED VOMITING TYPE: ICD-10-CM

## 2021-09-21 DIAGNOSIS — R74.01 TRANSAMINITIS: ICD-10-CM

## 2021-09-21 LAB
ABO GROUP BLD: NORMAL
B-HCG UR QL: POSITIVE
BASOPHILS # BLD AUTO: 0.04 10*3/MM3 (ref 0–0.2)
BASOPHILS NFR BLD AUTO: 0.5 % (ref 0–1.5)
BLD GP AB SCN SERPL QL: NEGATIVE
DEPRECATED RDW RBC AUTO: 37.1 FL (ref 37–54)
EOSINOPHIL # BLD AUTO: 0.05 10*3/MM3 (ref 0–0.4)
EOSINOPHIL NFR BLD AUTO: 0.6 % (ref 0.3–6.2)
ERYTHROCYTE [DISTWIDTH] IN BLOOD BY AUTOMATED COUNT: 11.2 % (ref 12.3–15.4)
GLUCOSE UR STRIP-MCNC: NEGATIVE MG/DL
HBV SURFACE AG SERPL QL IA: NORMAL
HCT VFR BLD AUTO: 38.6 % (ref 34–46.6)
HCV AB SER DONR QL: NORMAL
HGB BLD-MCNC: 12.6 G/DL (ref 12–15.9)
HIV1+2 AB SER QL: NORMAL
IMM GRANULOCYTES # BLD AUTO: 0.05 10*3/MM3 (ref 0–0.05)
IMM GRANULOCYTES NFR BLD AUTO: 0.6 % (ref 0–0.5)
INTERNAL NEGATIVE CONTROL: ABNORMAL
INTERNAL POSITIVE CONTROL: ABNORMAL
LYMPHOCYTES # BLD AUTO: 1.8 10*3/MM3 (ref 0.7–3.1)
LYMPHOCYTES NFR BLD AUTO: 21.5 % (ref 19.6–45.3)
Lab: ABNORMAL
MCH RBC QN AUTO: 29.7 PG (ref 26.6–33)
MCHC RBC AUTO-ENTMCNC: 32.6 G/DL (ref 31.5–35.7)
MCV RBC AUTO: 91 FL (ref 79–97)
MONOCYTES # BLD AUTO: 0.44 10*3/MM3 (ref 0.1–0.9)
MONOCYTES NFR BLD AUTO: 5.3 % (ref 5–12)
NEUTROPHILS NFR BLD AUTO: 6 10*3/MM3 (ref 1.7–7)
NEUTROPHILS NFR BLD AUTO: 71.5 % (ref 42.7–76)
NRBC BLD AUTO-RTO: 0 /100 WBC (ref 0–0.2)
PLATELET # BLD AUTO: 265 10*3/MM3 (ref 140–450)
PMV BLD AUTO: 10.4 FL (ref 6–12)
PROT UR STRIP-MCNC: NEGATIVE MG/DL
RBC # BLD AUTO: 4.24 10*6/MM3 (ref 3.77–5.28)
RH BLD: POSITIVE
WBC # BLD AUTO: 8.38 10*3/MM3 (ref 3.4–10.8)

## 2021-09-21 PROCEDURE — 81025 URINE PREGNANCY TEST: CPT | Performed by: OBSTETRICS & GYNECOLOGY

## 2021-09-21 PROCEDURE — 86787 VARICELLA-ZOSTER ANTIBODY: CPT | Performed by: OBSTETRICS & GYNECOLOGY

## 2021-09-21 PROCEDURE — 88175 CYTOPATH C/V AUTO FLUID REDO: CPT | Performed by: OBSTETRICS & GYNECOLOGY

## 2021-09-21 PROCEDURE — 86803 HEPATITIS C AB TEST: CPT | Performed by: OBSTETRICS & GYNECOLOGY

## 2021-09-21 PROCEDURE — 87086 URINE CULTURE/COLONY COUNT: CPT | Performed by: OBSTETRICS & GYNECOLOGY

## 2021-09-21 PROCEDURE — 87591 N.GONORRHOEAE DNA AMP PROB: CPT | Performed by: OBSTETRICS & GYNECOLOGY

## 2021-09-21 PROCEDURE — 0501F PRENATAL FLOW SHEET: CPT | Performed by: OBSTETRICS & GYNECOLOGY

## 2021-09-21 PROCEDURE — 80053 COMPREHEN METABOLIC PANEL: CPT | Performed by: OBSTETRICS & GYNECOLOGY

## 2021-09-21 PROCEDURE — 87491 CHLMYD TRACH DNA AMP PROBE: CPT | Performed by: OBSTETRICS & GYNECOLOGY

## 2021-09-21 PROCEDURE — G0432 EIA HIV-1/HIV-2 SCREEN: HCPCS | Performed by: OBSTETRICS & GYNECOLOGY

## 2021-09-21 RX ORDER — DIPHENHYDRAMINE HYDROCHLORIDE 25 MG/1
25 CAPSULE ORAL 2 TIMES DAILY PRN
Qty: 60 TABLET | Refills: 1 | Status: ON HOLD | OUTPATIENT
Start: 2021-09-21 | End: 2022-04-11

## 2021-09-21 NOTE — PROGRESS NOTES
OB Initial Visit    CC- Here for care of current pregnancy     ANGEL Finalized: Due date finalized: SEE EPIC    Subjective:  31 y.o.  presenting for her first obstetrical visit.    LMP: Patient's last menstrual period was 2021. sure    COMPLAINS OF: Nausea    Reviewed and updated:  OBHx, GYNHx (STDs), PMHx, Medications, Allergies, PSHx, Social Hx, Preventative Hx (PAP), Hx of abuse/safe environment, Vaccine Hx including hx of chickenpox or vaccine, Genetic Hx (pt, FOB, both families).        Objective:  /70   Wt 68.5 kg (151 lb)   LMP 2021   BMI 22.30 kg/m²   General- NAD, alert and oriented, appropriate  Psych- Normal mood, good memory  Neck- No masses, no thyroid enlargement  CV- Regular rhythm, no murnurs  Resp- CTA to bases, no wheezes  Abdomen- Soft, non distended, non tender, no masses     Breast left- No mass, non tender, no nipple discharge  Breast right- No mass, non tender, no nipple discharge    External genitalia- Normal, no lesions  Urethra- Normal, no masses, non tender  Vagina- Normal, no discharge  Bladder- Normal, no masses, non tender  Cvx- Normal, no lesions, no discharge, no CMT  Uterus- Normal shape and consistency, non tender, Consistent with dates  Adnexa- Normal, no mass, non tender    Lymphatic- No palpable neck, axillary, or groin nodes  Ext- No edema, no cyanosis    Skin- No lesions, no rashes, no acanthosis nigricans      Assessment and Plan:  Diagnoses and all orders for this visit:    1. Supervision of other normal pregnancy, antepartum (Primary)  Overview:  Initial visit:  • PNL:  • PAP/GC/CT/Urine culture:  • Blood type:  • VZV IgG:    Genetic testing:    • Counseled.  Declines all.     Vaccinations:  • COVID: x2 already received  • Influenza: Recommended 21  • Tdap:    24-28 weeks:  • 1hr GCT:  • Hct/Plt:  • Tdap Rx  • Rhogam:     Third Trimester:  • GBS:  • Breast pump:    Ultrasound:  • Dating: First tri US confirmed ANGEL by LMP  • Anatomy:   • Follow  up:     PLAN:    Plan US at 26 weeks and 30weeks due to hx  Considering - calculator next OV if pt desires      Orders:  -     POC Urinalysis Dipstick  -     POC Pregnancy, Urine  -     Urine Culture - Urine, Urine, Clean Catch  -     OB Panel With HIV  -     IGP, CtNg, Rfx Aptima HPV ASCU  -     Varicella Zoster Antibody, IgG    2. Pregnancy test performed, pregnancy confirmed  -     POC Pregnancy, Urine    3. Irritable bowel syndrome, unspecified type    4. Non-intractable vomiting with nausea, unspecified vomiting type  -     vitamin B-6 (PYRIDOXINE) 25 MG tablet; Take 1 tablet by mouth 2 (Two) Times a Day As Needed (Nausea).  Dispense: 60 tablet; Refill: 1  -     doxylamine (UNISOM) 25 MG tablet; Take 1 tablet by mouth At Night As Needed for Sleep or Nausea (or anxiety).  Dispense: 30 tablet; Refill: 1    5. Transaminitis  -     Comprehensive Metabolic Panel    6. H/O:             6w6d    Genetic Screening: Counseled.  Declines all.     Vaccines: APVACCINEHX: Recommend FLU vaccine this season, R/B discussed, s/p COVID vaccine    Counseling: Nutrition discussed, calories, activity/exercise in pregnancy, Discussed dietary restrictions/safety food preparation in pregnancy, Reviewed what to expect prenatal visits, office providers, Appropriate trimester precautions provided, N/V, vag bleeding, cramping, Questions answered    Labs: Prenatal labs, cultures, and PAP performed (prn), VZV IgG    Return in about 4 weeks (around 10/19/2021) for Follow up OB.  SCHEDULE 2 Q4WEEK OV..      Beata Castillo DO  2021    [facility] [place] [facility]  [address] [place] [address]  Dept: [phone]  Dept Fax: [phone]  Loc: [phone]  Loc Fax: [phone]

## 2021-09-22 LAB
ALBUMIN SERPL-MCNC: 4.4 G/DL (ref 3.5–5.2)
ALBUMIN/GLOB SERPL: 2 G/DL
ALP SERPL-CCNC: 45 U/L (ref 39–117)
ALT SERPL W P-5'-P-CCNC: 6 U/L (ref 1–33)
ANION GAP SERPL CALCULATED.3IONS-SCNC: 9 MMOL/L (ref 5–15)
AST SERPL-CCNC: 9 U/L (ref 1–32)
BACTERIA SPEC AEROBE CULT: NO GROWTH
BILIRUB SERPL-MCNC: 0.3 MG/DL (ref 0–1.2)
BUN SERPL-MCNC: 12 MG/DL (ref 6–20)
BUN/CREAT SERPL: 19.7 (ref 7–25)
CALCIUM SPEC-SCNC: 9.2 MG/DL (ref 8.6–10.5)
CHLORIDE SERPL-SCNC: 99 MMOL/L (ref 98–107)
CO2 SERPL-SCNC: 26 MMOL/L (ref 22–29)
CREAT SERPL-MCNC: 0.61 MG/DL (ref 0.57–1)
GFR SERPL CREATININE-BSD FRML MDRD: 116 ML/MIN/1.73
GLOBULIN UR ELPH-MCNC: 2.2 GM/DL
GLUCOSE SERPL-MCNC: 89 MG/DL (ref 65–99)
POTASSIUM SERPL-SCNC: 3.9 MMOL/L (ref 3.5–5.2)
PROT SERPL-MCNC: 6.6 G/DL (ref 6–8.5)
RPR SER QL: NORMAL
SODIUM SERPL-SCNC: 134 MMOL/L (ref 136–145)

## 2021-09-23 LAB
RUBV IGG SERPL IA-ACNC: 2.03 INDEX
VZV IGG SER IA-ACNC: <135 INDEX

## 2021-09-24 LAB
C TRACH RRNA CVX QL NAA+PROBE: NEGATIVE
CONV .: NORMAL
CYTOLOGIST CVX/VAG CYTO: NORMAL
CYTOLOGY CVX/VAG DOC CYTO: NORMAL
CYTOLOGY CVX/VAG DOC THIN PREP: NORMAL
DX ICD CODE: NORMAL
HIV 1 & 2 AB SER-IMP: NORMAL
N GONORRHOEA RRNA CVX QL NAA+PROBE: NEGATIVE
OTHER STN SPEC: NORMAL
STAT OF ADQ CVX/VAG CYTO-IMP: NORMAL

## 2021-10-19 ENCOUNTER — ROUTINE PRENATAL (OUTPATIENT)
Dept: OBSTETRICS AND GYNECOLOGY | Facility: CLINIC | Age: 31
End: 2021-10-19

## 2021-10-19 VITALS — DIASTOLIC BLOOD PRESSURE: 71 MMHG | SYSTOLIC BLOOD PRESSURE: 126 MMHG | WEIGHT: 153 LBS | BODY MASS INDEX: 22.59 KG/M2

## 2021-10-19 DIAGNOSIS — Z34.80 SUPERVISION OF OTHER NORMAL PREGNANCY, ANTEPARTUM: Primary | ICD-10-CM

## 2021-10-19 DIAGNOSIS — R74.01 TRANSAMINITIS: ICD-10-CM

## 2021-10-19 DIAGNOSIS — Z98.891 H/O: C-SECTION: ICD-10-CM

## 2021-10-19 LAB
GLUCOSE UR STRIP-MCNC: NEGATIVE MG/DL
PROT UR STRIP-MCNC: NEGATIVE MG/DL

## 2021-10-19 PROCEDURE — 0502F SUBSEQUENT PRENATAL CARE: CPT | Performed by: OBSTETRICS & GYNECOLOGY

## 2021-10-19 NOTE — PROGRESS NOTES
OB FOLLOW UP        Chief Complaint   Patient presents with   • Routine Prenatal Visit       Subjective:   • No complaints    Objective:  /71   Wt 69.4 kg (153 lb)   LMP 2021   BMI 22.59 kg/m²   Uterine Size: suprapubic  FHT: 110-160 BPM    See OB flow for LE edema, cvx exam if performed, and Upro/Uglu    Assessment and Plan:  10w6d  Reassuring pregnancy progress.  Questions answered.  Diagnoses and all orders for this visit:    1. Supervision of other normal pregnancy, antepartum (Primary)  Overview:  Initial visit:  • PNL: wnl  • PAP/GC/CT/Urine culture: All neg  • VZV IgG: Non immune  • CMP: wnl    Genetic testing:    • Counseled.  Declines all.     Vaccinations:  • COVID: x2 already received  • Influenza: Recommended 21  • Tdap:    24-28 weeks:  • 1hr GCT:  • Hct/Plt:  • Tdap Rx  • Rhogam: na, A+    Third Trimester:  • GBS:  • Breast pump:    Ultrasound:  • Dating: First tri US confirmed ANGEL by LMP  • Anatomy:   • Follow up:     PLAN:    VZV vaccination PP  Plan US at 26 weeks and 30weeks due to hx  Desires - 10/19/2021 calculator 81%, discussed, pt understands will not plan IOL.       Orders:  -     US Ob Detail Fetal Anatomy Single or First Gestation; Future  -     POC Urinalysis Dipstick    2. Transaminitis    3. H/O:       Counseling:    • First trimester precautions, bleeding, cramping, N/V  • Second trimester precautions  • Continue PNV.  Importance of healthy eating and exercise.    Return in about 4 weeks (around 2021) for Follow up OB x2.            Beata Castillo, DO  10/19/2021    [facility]  [facility] [place] [address]  Dept: [phone]  Dept Fax: [phone]  Loc: [phone]  Loc Fax: [phone]

## 2021-11-08 ENCOUNTER — TRANSCRIBE ORDERS (OUTPATIENT)
Dept: ADMINISTRATIVE | Facility: HOSPITAL | Age: 31
End: 2021-11-08

## 2021-11-08 ENCOUNTER — HOSPITAL ENCOUNTER (OUTPATIENT)
Dept: ULTRASOUND IMAGING | Facility: HOSPITAL | Age: 31
Discharge: HOME | End: 2021-11-08
Admitting: OBSTETRICS & GYNECOLOGY

## 2021-11-08 ENCOUNTER — TELEPHONE (OUTPATIENT)
Dept: OBSTETRICS AND GYNECOLOGY | Facility: CLINIC | Age: 31
End: 2021-11-08

## 2021-11-08 ENCOUNTER — ROUTINE PRENATAL (OUTPATIENT)
Dept: OBSTETRICS AND GYNECOLOGY | Facility: CLINIC | Age: 31
End: 2021-11-08

## 2021-11-08 VITALS — DIASTOLIC BLOOD PRESSURE: 74 MMHG | WEIGHT: 153 LBS | BODY MASS INDEX: 22.59 KG/M2 | SYSTOLIC BLOOD PRESSURE: 117 MMHG

## 2021-11-08 DIAGNOSIS — Z34.80 SUPERVISION OF OTHER NORMAL PREGNANCY, ANTEPARTUM: Primary | ICD-10-CM

## 2021-11-08 DIAGNOSIS — O42.90 AMNIOTIC FLUID LEAKING: Primary | ICD-10-CM

## 2021-11-08 DIAGNOSIS — O42.90 PREMATURE RUPTURE OF MEMBRANES IN PREGNANCY, DELIVERED: Primary | ICD-10-CM

## 2021-11-08 DIAGNOSIS — O42.90 PREMATURE RUPTURE OF MEMBRANES IN PREGNANCY, DELIVERED: ICD-10-CM

## 2021-11-08 LAB
GLUCOSE UR STRIP-MCNC: NEGATIVE MG/DL
PROT UR STRIP-MCNC: NEGATIVE MG/DL

## 2021-11-08 PROCEDURE — 76805 OB US >/= 14 WKS SNGL FETUS: CPT

## 2021-11-08 PROCEDURE — 0502F SUBSEQUENT PRENATAL CARE: CPT | Performed by: OBSTETRICS & GYNECOLOGY

## 2021-11-08 NOTE — PROGRESS NOTES
OB FOLLOW UP        Chief Complaint   Patient presents with   • Routine Prenatal Visit       Subjective:   • Started yesterday, 3 times, all after urinating.  Pad overnight was a bit wet, clear.    No VB.  No pain or cramping.      Objective:  /74   Wt 69.4 kg (153 lb)   LMP 2021   BMI 22.59 kg/m²  0.907 kg (2 lb)  Uterine Size: not examined, US today  FHT: 110-160 BPM  Pelvic- Spec: neg pool, neg valsalva, neg nitrazine.  No vag discharge.  Cvx appears closed.    See OB flow for LE edema, cvx exam if performed, and Upro/Uglu    US Ob 14 + Weeks Single or First Gestation (2021 14:50)     Assessment and Plan:  13w5d  Reassuring pregnancy progress.  Questions answered.  Diagnoses and all orders for this visit:    1. Supervision of other normal pregnancy, antepartum (Primary)  Overview:  Initial visit:  • PNL: wnl  • PAP/GC/CT/Urine culture: All neg  • VZV IgG: Non immune  • CMP: wnl    Genetic testing:    • Counseled.  Declines all.     Vaccinations:  • COVID: x2 already received  • Influenza: Recommended 21  • Tdap:    24-28 weeks:  • 1hr GCT:  • Hct/Plt:  • Tdap Rx  • Rhogam: na, A+    Third Trimester:  • GBS:  • Breast pump:    Ultrasound:  • Dating: First tri US confirmed ANGEL by LMP  • 2021 low lying placenta 13+5 weeks by ANGEL  • Anatomy:   • Follow up:     PLAN:  VZV vaccination PP  Plan US at 26 weeks and 30weeks due to hx  Desires - 10/19/2021 calculator 81%, discussed, pt understands will not plan IOL.   2021 reassurrance no evidence of ROM      Orders:  -     POC Urinalysis Dipstick    Counseling:    • Second trimester precautions  • Reassurrance.   • Continue PNV.  Importance of healthy eating and staying active.    Return if symptoms worsen or fail to improve and keep next scheduled OV.        Beata Castillo,   2021    [facility]  [facility] [place] [address]  Dept: [phone]  Dept Fax: [phone]  Loc:  [phone]  Loc Fax: [phone]

## 2021-11-08 NOTE — TELEPHONE ENCOUNTER
Pt called stating she feels as if she may be leaking amniotic fluid. She states she had three episodes of ''leaking'' yesterday even after emptying her bladder. She states she put a pad on and the fluid dried up clear. She states she woke up in the middle of the night with leakage also. She is concerned due to have her last baby in 2019 @ 33 weeks via emergency c section and there was minimal fluid noted at that time. Pts next appt is with you on 11/16/21. Please advise with recommendations.

## 2021-11-11 ENCOUNTER — DOCUMENTATION (OUTPATIENT)
Dept: OBSTETRICS AND GYNECOLOGY | Facility: CLINIC | Age: 31
End: 2021-11-11

## 2021-11-12 NOTE — PROGRESS NOTES
The patient contacted the answering service with concerns.  She reported she was having left upper quadrant pain, that radiated down into the abdomen, since yesterday.  She denied fever, chills, nausea or vomiting, vaginal bleeding, diarrhea or pelvic pain.  She was concerned this was contractions.  She had not taken any pain medication (tylenol)  Discussed that pain in this location should not be contractions or a gynecologic etiology.  Discussed that her only option for evaluation tonight was to come to the ED, or she could contact the office in the morning.    Electronically signed by Abraham Lawson MD, 11/11/21, 8:46 PM EST.

## 2021-11-16 ENCOUNTER — ROUTINE PRENATAL (OUTPATIENT)
Dept: OBSTETRICS AND GYNECOLOGY | Facility: CLINIC | Age: 31
End: 2021-11-16

## 2021-11-16 VITALS — BODY MASS INDEX: 22.45 KG/M2 | WEIGHT: 152 LBS | DIASTOLIC BLOOD PRESSURE: 78 MMHG | SYSTOLIC BLOOD PRESSURE: 119 MMHG

## 2021-11-16 DIAGNOSIS — Z34.80 SUPERVISION OF OTHER NORMAL PREGNANCY, ANTEPARTUM: Primary | ICD-10-CM

## 2021-11-16 DIAGNOSIS — Z98.891 H/O: C-SECTION: ICD-10-CM

## 2021-11-16 LAB
GLUCOSE UR STRIP-MCNC: NEGATIVE MG/DL
PROT UR STRIP-MCNC: ABNORMAL MG/DL

## 2021-11-16 PROCEDURE — 0502F SUBSEQUENT PRENATAL CARE: CPT | Performed by: OBSTETRICS & GYNECOLOGY

## 2021-11-16 NOTE — PROGRESS NOTES
OB FOLLOW UP        Chief Complaint   Patient presents with   • Routine Prenatal Visit     Trapped Gas       Subjective:   • No complaints   • Had Left upper quad pain, now gone after gas X    Objective:  /78   Wt 68.9 kg (152 lb)   LMP 2021   BMI 22.45 kg/m²  0.454 kg (1 lb)  Uterine Size: suprapubic  FHT: 110-160 BPM    See OB flow for LE edema, cvx exam if performed, and Upro/Uglu    Assessment and Plan:  14w6d  Reassuring pregnancy progress.  Questions answered.  Diagnoses and all orders for this visit:    1. Supervision of other normal pregnancy, antepartum (Primary)  Overview:  ANGEL finalized 21 by First tri US confirmed ANGEL by LMP    Initial visit:  • PNL: wnl  • PAP/GC/CT/Urine culture: All neg  • VZV IgG: Non immune  • CMP: wnl    Genetic testing:    • Counseled.  Declines all.     Vaccinations:  • COVID: x2 already received. 2021 booster recommended  • Influenza: vaccinated  • Tdap:    24-28 weeks:  • 1hr GCT:  • Hct/Plt:  • Tdap Rx  • Rhogam: na, A+    Third Trimester:  • GBS:  • Breast pump:    Ultrasound:  • 2021 low lying placenta 13+5 weeks by ANGEL  • Anatomy:   • Follow up:     PLAN:  VZV vaccination PP  Plan US at 26 weeks and 30weeks due to hx  Desires - 10/19/2021 calculator 81%, discussed, pt understands will not plan IOL.       Orders:  -     POC Urinalysis Dipstick    2. H/O:       Counseling:    • Second trimester precautions  • Continue PNV.  Importance of healthy eating and staying active.    Return in about 4 weeks (around 2021) for Follow up OB w anatomy US, then 4weeks later.        Beata Castillo, DO  2021    [facility] [place] [facility]  [address] [place] [address]  Dept: [phone]  Dept Fax: [phone]  Loc: [phone]  Loc Fax: [phone]

## 2021-12-14 ENCOUNTER — ROUTINE PRENATAL (OUTPATIENT)
Dept: OBSTETRICS AND GYNECOLOGY | Facility: CLINIC | Age: 31
End: 2021-12-14

## 2021-12-14 VITALS — BODY MASS INDEX: 23.18 KG/M2 | SYSTOLIC BLOOD PRESSURE: 123 MMHG | DIASTOLIC BLOOD PRESSURE: 85 MMHG | WEIGHT: 157 LBS

## 2021-12-14 DIAGNOSIS — Z98.891 H/O: C-SECTION: ICD-10-CM

## 2021-12-14 DIAGNOSIS — Z34.80 SUPERVISION OF OTHER NORMAL PREGNANCY, ANTEPARTUM: Primary | ICD-10-CM

## 2021-12-14 LAB
GLUCOSE UR STRIP-MCNC: NEGATIVE MG/DL
PROT UR STRIP-MCNC: NEGATIVE MG/DL

## 2021-12-14 PROCEDURE — 0502F SUBSEQUENT PRENATAL CARE: CPT | Performed by: OBSTETRICS & GYNECOLOGY

## 2021-12-14 NOTE — PROGRESS NOTES
OB FOLLOW UP      Chief Complaint   Patient presents with   • Routine Prenatal Visit     Subjective:   • Some nausea- has meds and they help    Objective:  /85   Wt 71.2 kg (157 lb)   LMP 2021   BMI 23.18 kg/m²  2.722 kg (6 lb)  Uterine Size: not examined, US today  FHT: 110-160 BPM    See OB flow for edema, cvx exam if performed, and Upro/Uglu    Assessment and Plan:  18w6d  Reassuring pregnancy progress.  Questions answered.  Diagnoses and all orders for this visit:    1. Supervision of other normal pregnancy, antepartum (Primary)  Overview:  ANGEL 21 by First tri US confirmed ANGEL by LMP    Genetic testing:  Counseled.  Declines all.     COVID: x2 already received. S/p booster  Influenza: vaccinated  Tdap:    ?sterilization:    Ultrasound:  2021 low lying placenta 13+5 weeks by ANGEL  Anatomy: 2021 complete CWD ant placenta  Follow up:     PLAN:  VZV vaccination PP  Plan US at 26 weeks and 30weeks due to hx  Desires - 10/19/2021 calculator 81%, discussed, pt understands will not plan IOL.       Orders:  -     POC Urinalysis Dipstick    2. H/O:       Counseling:    • Second trimester precautions  • Continue PNV.  Importance of healthy eating and staying active.    Return in about 4 weeks (around 2022) for Follow up OB x2 (second one w Glucola).      Beata Castillo, DO  2021    [facility] [place] [facility]  [address] [place] [address] [address] [place] [address]  Dept: [phone]  Dept Fax: [phone]  Loc: [phone]  Loc Fax: [phone]

## 2022-01-08 ENCOUNTER — LAB (OUTPATIENT)
Dept: LAB | Facility: HOSPITAL | Age: 32
End: 2022-01-08

## 2022-01-08 ENCOUNTER — TRANSCRIBE ORDERS (OUTPATIENT)
Dept: LAB | Facility: HOSPITAL | Age: 32
End: 2022-01-08

## 2022-01-08 DIAGNOSIS — Z01.818 PRE-OP TESTING: Primary | ICD-10-CM

## 2022-01-08 DIAGNOSIS — Z01.818 PRE-OP TESTING: ICD-10-CM

## 2022-01-08 PROCEDURE — U0004 COV-19 TEST NON-CDC HGH THRU: HCPCS

## 2022-01-08 PROCEDURE — C9803 HOPD COVID-19 SPEC COLLECT: HCPCS

## 2022-01-09 LAB — SARS-COV-2 RNA PNL SPEC NAA+PROBE: NOT DETECTED

## 2022-01-10 ENCOUNTER — ROUTINE PRENATAL (OUTPATIENT)
Dept: OBSTETRICS AND GYNECOLOGY | Facility: CLINIC | Age: 32
End: 2022-01-10

## 2022-01-10 VITALS — DIASTOLIC BLOOD PRESSURE: 74 MMHG | WEIGHT: 166 LBS | SYSTOLIC BLOOD PRESSURE: 116 MMHG | BODY MASS INDEX: 24.51 KG/M2

## 2022-01-10 DIAGNOSIS — Z98.891 H/O: C-SECTION: ICD-10-CM

## 2022-01-10 DIAGNOSIS — Z34.80 SUPERVISION OF OTHER NORMAL PREGNANCY, ANTEPARTUM: Primary | ICD-10-CM

## 2022-01-10 LAB
GLUCOSE UR STRIP-MCNC: NEGATIVE MG/DL
PROT UR STRIP-MCNC: NEGATIVE MG/DL

## 2022-01-10 PROCEDURE — 0502F SUBSEQUENT PRENATAL CARE: CPT | Performed by: OBSTETRICS & GYNECOLOGY

## 2022-01-10 NOTE — PROGRESS NOTES
OB FOLLOW UP      Chief Complaint   Patient presents with   • Routine Prenatal Visit     Subjective:   • HA  • Pubic bone pain  • HA daily for 1 week, tylenol helps some.  Caffeine once a day pre and during preg.  Gettinig good rest and hydration.  Pubic bone pain, midline and left worse w flexion of hip.    Objective:  /74   Wt 75.3 kg (166 lb)   LMP 2021   BMI 24.51 kg/m²  6.804 kg (15 lb)  Uterine Size: size equals dates  FHT: 110-160 BPM    See OB flow for edema, cvx exam if performed, and Upro/Uglu    Assessment and Plan:  22w5d  Reassuring pregnancy progress.  Questions answered.  Diagnoses and all orders for this visit:    1. Supervision of other normal pregnancy, antepartum (Primary)  Overview:  ANGEL 21 by First tri US confirmed ANGEL by LMP    Genetic testing:  Counseled.  Declines all.     COVID: x2 already received. S/P booster  Influenza: vaccinated  Tdap:    ?sterilization:    Ultrasound:  2021 low lying placenta 13+5 weeks by ANGEL  Anatomy: 2021 complete CWD ant placenta  Follow up:     PLAN:  VZV vaccination PP  Plan US at 26 weeks and 30weeks due to hx anhydramnios at delivery wo SROM  Desires - 10/19/2021 calculator 81%, discussed, pt understands will not plan IOL.       Orders:  -     POC Urinalysis Dipstick    2. H/O:  plans       Counseling:    • Second trimester precautions  • DIASTASIS PUBIS discussed.  Recommend conservative measures, using passive ROM, assistance w hip flexion using upper arms, pregnancy support belt low on hips.  • Conservative measure options w HA reviewed  • Continue PNV.  Importance of healthy eating and staying active.    Return in about 4 weeks (around 2022) for Follow up OB w glucola, then OV 2weeks later x2.        Beata Castillo, DO  01/10/2022    [facility] [place] [facility]  [facility] [place] [address]  Dept: [phone]  Dept Fax: [phone]  Loc: [phone]  Loc Fax:  [phone]

## 2022-02-07 ENCOUNTER — TELEPHONE (OUTPATIENT)
Dept: OBSTETRICS AND GYNECOLOGY | Facility: CLINIC | Age: 32
End: 2022-02-07

## 2022-02-07 NOTE — TELEPHONE ENCOUNTER
Discussed  recommendations with patient. Let her know we could call in an RX to help with the headaches. She has an appt tomorrow and will discuss further @ that appt.

## 2022-02-07 NOTE — TELEPHONE ENCOUNTER
"Pt called with complaints of a headache off and on for about 6 days. She had her BP checked and it was 138/82 this morning. She has no vision changes. She states tylenol sometimes helps the headaches but not always. She states she has noticed her ankles are mildly puffy but \"nothing compared to how swollen she was during her last pregnancy.\" Please advise with recommendations.  "

## 2022-02-07 NOTE — PROGRESS NOTES
OB FOLLOW UP      Chief Complaint   Patient presents with   • Routine Prenatal Visit     Subjective:   • No complaints    Objective:  /86   Wt 78 kg (172 lb)   LMP 2021   BMI 25.40 kg/m²  9.526 kg (21 lb)  Uterine Size: size equals dates  FHT: 110-160 BPM    See OB flow for edema, cvx exam if performed, and Upro/Uglu    Assessment and Plan:  26w5d  Reassuring pregnancy progress.  Questions answered.  Diagnoses and all orders for this visit:    1. Supervision of other normal pregnancy, antepartum (Primary)  Overview:  ANGEL 21 by First tri US confirmed ANGEL by LMP    Genetic testing:  Counseled.  Declines all.     COVID: x2 already received. S/P booster  Influenza: vaccinated  Tdap: Rx 2022     ?sterilization: No    Ultrasound:  2021 low lying placenta 13+5 weeks by ANGEL  Anatomy: 2021 complete CWD ant placenta  FU US:    PLAN:  VZV vaccination PP  Plan US at 26 weeks and 30weeks due to hx anhydramnios at delivery wo SROM.  Scheduled for mid march.  Desires - 10/19/2021 calculator 81%, discussed, pt understands will not plan IOL.     ~~ consent w pt 2022, plan to discuss after US.      Orders:  -     POC Urinalysis Dipstick  -     CBC (No Diff)  -     Gestational Diabetes Screen  -     US Ob 14 + Weeks Single or First Gestation; Future 4 weeks, w OB hx w G1- oligo and fetal distress w emergent CS    Counseling:    • OB precautions, leaking, VB, angela tejeda vs PTL/Labor  • FKC  • Continue PNV.  Importance of healthy eating and staying active.    Return in about 2 weeks (around 2022) for Follow up OB [id number] to 36weeks, US in approx 4weeks.            Beata Castillo,   2022    [facility] [place] [facility]  [address] [place] [address]  Dept: [phone]  Dept Fax: [phone]  Loc: [phone]  Loc Fax: [phone]

## 2022-02-08 ENCOUNTER — ROUTINE PRENATAL (OUTPATIENT)
Dept: OBSTETRICS AND GYNECOLOGY | Facility: CLINIC | Age: 32
End: 2022-02-08

## 2022-02-08 VITALS — BODY MASS INDEX: 25.4 KG/M2 | DIASTOLIC BLOOD PRESSURE: 86 MMHG | WEIGHT: 172 LBS | SYSTOLIC BLOOD PRESSURE: 124 MMHG

## 2022-02-08 DIAGNOSIS — Z34.80 SUPERVISION OF OTHER NORMAL PREGNANCY, ANTEPARTUM: Primary | ICD-10-CM

## 2022-02-08 LAB
DEPRECATED RDW RBC AUTO: 37.7 FL (ref 37–54)
ERYTHROCYTE [DISTWIDTH] IN BLOOD BY AUTOMATED COUNT: 11.6 % (ref 12.3–15.4)
GLUCOSE 1H P GLC SERPL-MCNC: 179 MG/DL (ref 65–139)
GLUCOSE UR STRIP-MCNC: NEGATIVE MG/DL
HCT VFR BLD AUTO: 36 % (ref 34–46.6)
HGB BLD-MCNC: 12.2 G/DL (ref 12–15.9)
MCH RBC QN AUTO: 30.5 PG (ref 26.6–33)
MCHC RBC AUTO-ENTMCNC: 33.9 G/DL (ref 31.5–35.7)
MCV RBC AUTO: 90 FL (ref 79–97)
PLATELET # BLD AUTO: 268 10*3/MM3 (ref 140–450)
PMV BLD AUTO: 10.2 FL (ref 6–12)
PROT UR STRIP-MCNC: NEGATIVE MG/DL
RBC # BLD AUTO: 4 10*6/MM3 (ref 3.77–5.28)
WBC NRBC COR # BLD: 9.44 10*3/MM3 (ref 3.4–10.8)

## 2022-02-08 PROCEDURE — 0502F SUBSEQUENT PRENATAL CARE: CPT | Performed by: OBSTETRICS & GYNECOLOGY

## 2022-02-08 PROCEDURE — 85027 COMPLETE CBC AUTOMATED: CPT | Performed by: OBSTETRICS & GYNECOLOGY

## 2022-02-08 PROCEDURE — 82950 GLUCOSE TEST: CPT | Performed by: OBSTETRICS & GYNECOLOGY

## 2022-02-09 ENCOUNTER — TELEPHONE (OUTPATIENT)
Dept: OBSTETRICS AND GYNECOLOGY | Facility: CLINIC | Age: 32
End: 2022-02-09

## 2022-02-11 ENCOUNTER — HOSPITAL ENCOUNTER (OUTPATIENT)
Facility: HOSPITAL | Age: 32
Discharge: HOME | End: 2022-02-11
Attending: OBSTETRICS & GYNECOLOGY | Admitting: OBSTETRICS & GYNECOLOGY

## 2022-02-11 VITALS
HEIGHT: 69 IN | DIASTOLIC BLOOD PRESSURE: 64 MMHG | WEIGHT: 172 LBS | HEART RATE: 83 BPM | TEMPERATURE: 98 F | OXYGEN SATURATION: 100 % | SYSTOLIC BLOOD PRESSURE: 123 MMHG | BODY MASS INDEX: 25.48 KG/M2

## 2022-02-11 PROCEDURE — 59025 FETAL NON-STRESS TEST: CPT | Performed by: OBSTETRICS & GYNECOLOGY

## 2022-02-11 PROCEDURE — G0463 HOSPITAL OUTPT CLINIC VISIT: HCPCS

## 2022-02-11 NOTE — NURSING NOTE
Pt came to triage with complaints of occasional contractions, when asked how often, pt states she has only had a few this afternoon.  Pt does feel baby moving, pt denies any vaginal leaking or bleeding. Pt states she started tammie at 33 weeks with her last kid, came in to be checked out and had to have an urgent csection due FHR decels.  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

## 2022-02-11 NOTE — NURSING NOTE
Spoke with Dr. Diaz at this time on his cell, pt came in with c/o occasional contractions, pt is a  27w2d, pt states she has only had a few this afternoon.  Pt does feel baby moving, pt denies any vaginal leaking or bleeding. Pt states she started tammie at 33 weeks with her last kid, came in to be checked out and had to have an urgent csection due FHR decels.  Pt denies anything in the vagina in the past 24 hours.  Pt has had one contraction since being here, FHR is reassuring at this time.  MD states to d/c patient home and to keep scheduled follow up appointments in the office.

## 2022-02-11 NOTE — NON STRESS TEST
"Obstetrical Non-stress Test Interpretation     Name:  Ailyn Bedoya  MRN: [id number]    31 y.o. female  at 27w2d    Indication: Triage for occasional contractions       Fetal Movement: active  Fetal HR Assessment Method: external  Fetal HR (beats/min): 135  Fetal Heart Baseline Rate: normal range  Fetal HR Variability: moderate (amplitude range 6 to 25 bpm)  Fetal HR Accelerations: greater than/equal to 10 bpm (32 wks gest or less), lasts at least 10 seconds (32 wks gest or less)  Fetal HR Decelerations: absent  Sinusoidal Pattern Present: absent    /64   Pulse 83   Ht 175.3 cm (69\")   Wt 78 kg (172 lb)   LMP 2021   SpO2 100%   BMI 25.40 kg/m²     Reason for test: OB Triage, Other (Comment) (occasional contractions)  Date of Test: 2022  Time frame of test: 5799-8788  RN NST Interpretation: Reactive      Lorna Mac RN  2022  17:15 EST  "

## 2022-02-12 ENCOUNTER — HOSPITAL ENCOUNTER (OUTPATIENT)
Facility: HOSPITAL | Age: 32
Discharge: HOME | End: 2022-02-12
Attending: STUDENT IN AN ORGANIZED HEALTH CARE EDUCATION/TRAINING PROGRAM | Admitting: STUDENT IN AN ORGANIZED HEALTH CARE EDUCATION/TRAINING PROGRAM

## 2022-02-12 VITALS
HEART RATE: 86 BPM | RESPIRATION RATE: 20 BRPM | HEIGHT: 69 IN | TEMPERATURE: 98.4 F | DIASTOLIC BLOOD PRESSURE: 70 MMHG | OXYGEN SATURATION: 100 % | BODY MASS INDEX: 25.48 KG/M2 | WEIGHT: 172 LBS | SYSTOLIC BLOOD PRESSURE: 126 MMHG

## 2022-02-12 LAB
BILIRUB UR QL STRIP: NEGATIVE
CLARITY UR: CLEAR
COLOR UR: YELLOW
GLUCOSE UR STRIP-MCNC: NEGATIVE MG/DL
HGB UR QL STRIP.AUTO: NEGATIVE
KETONES UR QL STRIP: NEGATIVE
LEUKOCYTE ESTERASE UR QL STRIP.AUTO: NEGATIVE
NITRITE UR QL STRIP: NEGATIVE
PH UR STRIP.AUTO: 7 [PH] (ref 5–8)
PROT UR QL STRIP: NEGATIVE
SP GR UR STRIP: 1.01 (ref 1–1.03)
UROBILINOGEN UR QL STRIP: NORMAL

## 2022-02-12 PROCEDURE — G0463 HOSPITAL OUTPT CLINIC VISIT: HCPCS

## 2022-02-12 PROCEDURE — 59025 FETAL NON-STRESS TEST: CPT

## 2022-02-12 PROCEDURE — 81003 URINALYSIS AUTO W/O SCOPE: CPT | Performed by: STUDENT IN AN ORGANIZED HEALTH CARE EDUCATION/TRAINING PROGRAM

## 2022-02-12 PROCEDURE — 59025 FETAL NON-STRESS TEST: CPT | Performed by: STUDENT IN AN ORGANIZED HEALTH CARE EDUCATION/TRAINING PROGRAM

## 2022-02-12 PROCEDURE — P9612 CATHETERIZE FOR URINE SPEC: HCPCS

## 2022-02-12 NOTE — PROGRESS NOTES
Obstetrical Non-stress Test Interpretation     Name:  Ailyn Bedoya  MRN: [id number]    31 y.o. female  at 27w3d    Indication: Patient complained of contractions  Patient has had a prior  delivery and was complaining of contractions.    Weeks Gestation: 27w3d     Baseline: 140 BPM  Variability:   Moderate/Normal (amplitude 6-25 bpm)  Accelerations: Present (<32 weeks) 10 x 10 bpm   Decelerations: Absent   Contractions:  Absent    Impression:  Reactive NST      Plan: Discharge to home.  Keep follow up per office instructions.      Micha Diaz Sr., MD  2022  08:20 EST

## 2022-02-13 NOTE — NON STRESS TEST
"Obstetrical Non-stress Test Interpretation     Name:  Ailyn Bedoya  MRN: [id number]    31 y.o. female  at 27w3d    Indication: abdominal cramping      Fetal Movement: active  Fetal HR Assessment Method: external  Fetal HR (beats/min): 145  Fetal Heart Baseline Rate: normal range  Fetal HR Variability: moderate (amplitude range 6 to 25 bpm)  Fetal HR Accelerations: absent  Fetal HR Decelerations: absent  Sinusoidal Pattern Present: absent    /70 (BP Location: Right arm, Patient Position: Lying)   Pulse 86   Temp 98.4 °F (36.9 °C) (Oral)   Resp 20   Ht 175.3 cm (69\")   Wt 78 kg (172 lb)   LMP 2021   SpO2 100%   BMI 25.40 kg/m²     Reason for test:  abdominal crampin  Date of Test: 2022  Time frame of test: 7175-2087  RN NST Interpretation:  appropriate for gestational age      Nat Bradford RN  2022  20:10 EST  "

## 2022-02-13 NOTE — NURSING NOTE
Notified Dr Calix via telephone.  presents with complaints of lower abdominal cramping with pain in legs. Patient denies LOF or VB. Patient reports +FM. Orders given to perform a straight cath UA and SVE.

## 2022-02-13 NOTE — NURSING NOTE
Notified Dr Calix of UA results.SVE 0/thick/-3. Orders given to DC home. Patient may take tylenol Q6HR for pain and discomfort. Patient DC home with belongings no distress noted.

## 2022-02-13 NOTE — NURSING NOTE
Pt presents to L and D c/o menstrual like cramping.  States she has had 2 contractions today as well.  Reports +FM, denies LOF or VB.  EFM applied.  Abd soft non tender.  Pt states cramping is in low abd and radiates to hips and down her inner thighs.

## 2022-02-16 ENCOUNTER — HOSPITAL ENCOUNTER (OUTPATIENT)
Facility: HOSPITAL | Age: 32
Discharge: HOME | End: 2022-02-16
Attending: OBSTETRICS & GYNECOLOGY | Admitting: OBSTETRICS & GYNECOLOGY

## 2022-02-16 ENCOUNTER — HOSPITAL ENCOUNTER (OUTPATIENT)
Facility: HOSPITAL | Age: 32
End: 2022-02-16
Attending: OBSTETRICS & GYNECOLOGY | Admitting: OBSTETRICS & GYNECOLOGY

## 2022-02-16 VITALS
OXYGEN SATURATION: 98 % | RESPIRATION RATE: 18 BRPM | WEIGHT: 172 LBS | HEART RATE: 100 BPM | HEIGHT: 69 IN | SYSTOLIC BLOOD PRESSURE: 133 MMHG | DIASTOLIC BLOOD PRESSURE: 79 MMHG | BODY MASS INDEX: 25.48 KG/M2 | TEMPERATURE: 98.9 F

## 2022-02-16 LAB
A1 MICROGLOB PLACENTAL VAG QL: NEGATIVE
BILIRUB BLD-MCNC: NEGATIVE MG/DL
CLARITY, POC: CLEAR
COLOR UR: YELLOW
GLUCOSE UR STRIP-MCNC: NEGATIVE MG/DL
KETONES UR QL: NEGATIVE
LEUKOCYTE EST, POC: NEGATIVE
NITRITE UR-MCNC: NEGATIVE MG/ML
PH UR: 6.5 [PH] (ref 5–8)
PROT UR STRIP-MCNC: NEGATIVE MG/DL
RBC # UR STRIP: NEGATIVE /UL
SP GR UR: 1.02 (ref 1–1.03)
UROBILINOGEN UR QL: NORMAL

## 2022-02-16 PROCEDURE — G0463 HOSPITAL OUTPT CLINIC VISIT: HCPCS

## 2022-02-16 PROCEDURE — 84112 EVAL AMNIOTIC FLUID PROTEIN: CPT | Performed by: OBSTETRICS & GYNECOLOGY

## 2022-02-16 PROCEDURE — 59025 FETAL NON-STRESS TEST: CPT | Performed by: OBSTETRICS & GYNECOLOGY

## 2022-02-16 PROCEDURE — 81002 URINALYSIS NONAUTO W/O SCOPE: CPT | Performed by: OBSTETRICS & GYNECOLOGY

## 2022-02-16 PROCEDURE — 59025 FETAL NON-STRESS TEST: CPT

## 2022-02-16 NOTE — NURSING NOTE
Patient arrived to Labor and Delivery triage with complaints of abdominal cramping and back pain since this morning. Patient also reports that she has felt leaking of fluid since this morning as well.. Vital signs taken and WNL. Pain rated 3/10.  Fetal monitors applied and initial assessment performed. See flowsheets for detailed assessment data. Provider notified of patients arrival and given status update. Orders given. RN to continue to monitor and assess.

## 2022-02-17 NOTE — NON STRESS TEST
"[facility]   OB NST Note    2022   Name:  Ailyn Bedoya  MRN: [id number]    Subjective:  31 y.o.  at 28w0d who presents to labor and delivery triage with multiple complaints.  She reports that she is concerned she could be leaking amniotic fluid.  She denies any large gases just feeling a wet sensation in the vaginal area.  She also reports low back discomfort and lower abdominal discomfort that she thinks are contractions.  She states that she has had about 10 of these throughout the day today.  She denies any vaginal bleeding or decreased fetal movement.    Indication: Contractions and Perceived SROM    NST:   Baseline: 135  Variability:   Moderate/Normal (amplitude 6-25 bpm)  Accelerations: Present (<32 weeks) 10 x 10 bpm   Decelerations: Occasional brief variable deceleration, nonrepetitive, and consistent with gestational age  Contractions:  Absent    NST interpretation: reactive    Documented Vitals    22 1736   BP: 133/79   Pulse: 100   Resp: 18   Temp: 98.9 °F (37.2 °C)   SpO2: 98%   Weight: 78 kg (172 lb)   Height: 175.3 cm (69\")         Lab Results (last 24 hours)     Procedure Component Value Units Date/Time    Rapid Assay For ROM - Amniotic Fluid, Amniotic Sac [[id number]]  (Normal) Collected: 22    Specimen: Amniotic Fluid from Amniotic Sac Updated: 22     Rupture of Membranes Negative    Narrative:      This test detects tiny amounts of amniotic fluid and is  approved for use at any gestational age. When there is   significant presence of blood on the swab, the test can   malfunction and is not recommended (possible false positive  results). In cases of only trace amounts of blood on the swab,  the test still functions properly and will not interfere with  the test results. In very rare cases when a sample is taken  12 hours or later after a rupture, a false negative result may  occur due to obstruction of the rupture by fetus or resealing  of the amniotic sac.    " POC Urinalysis Dipstick [[id number]] Collected: 22    Specimen: Urine Updated: 22     Color Yellow     Clarity, UA Clear     Glucose, UA Negative mg/dL      Bilirubin Negative     Ketones, UA Negative     Specific Gravity  1.020     Blood, UA Negative     pH, Urine 6.5     Protein, POC Negative mg/dL      Urobilinogen, UA Normal     Leukocytes Negative     Nitrite, UA Negative           Cervical Exam:  Closed, thick, -3 with no change after observation per nursing exam    Assessment:  31 y.o.  AT 28w0d  Contractions and Perceived SROM    Plan:   The patient was discharged home with a reactive NST, negative AmniSure, a closed and unchanged cervix, reassuring vital signs, and no contractions on the monitor.  OB Precautions, FKC, Keep office visit      Electronically signed by Abraham Lawson MD, 22, 7:37 PM EST.

## 2022-02-17 NOTE — NURSING NOTE
Discharge orders placed and patient may be discharged to home at this time. Discharge instructions given and reviewed with patient, with emphasis on signs of  labor and fetal kick counts. Patient agrees with plan of care and verbalizes understanding of discharge instructions. No further questions at this time and patient agrees to keep scheduled follow up appointment.

## 2022-02-17 NOTE — NON STRESS TEST
"Obstetrical Non-stress Test Interpretation     Name:  Ailyn Bedoya  MRN: [id number]    31 y.o. female  at 28w0d    Indication: Rule out labor, rule out rupture of membranes      Fetal Movement: active  Fetal HR Assessment Method: external  Fetal HR (beats/min): 140  Fetal Heart Baseline Rate: normal range  Fetal HR Variability: moderate (amplitude range 6 to 25 bpm)  Fetal HR Accelerations: greater than/equal to 10 bpm (32 wks gest or less), lasts at least 10 seconds (32 wks gest or less)  Fetal HR Decelerations: variable  Sinusoidal Pattern Present: absent    /79 (BP Location: Right arm, Patient Position: Lying)   Pulse 100   Temp 98.9 °F (37.2 °C) (Oral)   Resp 18   Ht 175.3 cm (69\")   Wt 78 kg (172 lb)   LMP 2021   SpO2 98%   BMI 25.40 kg/m²     Reason for test: OB Triage, Other (Comment) (rule out  labor and ROM)  Date of Test: 2022  Time frame of test: 3633-6101  RN NST Interpretation: Reactive (reactive for gestational age)      Raeann Deleon RN  2022  19:08 EST  "

## 2022-02-21 NOTE — PROGRESS NOTES
OB FOLLOW UP      Chief Complaint   Patient presents with   • Routine Prenatal Visit     Subjective:   • Cramps on and off, has been to L+D, cvx closed.  Cramping is now better. Concerned since G1 had similar sxs at 33+weeks and needed del.  Fetus slow to move today, but now moving well.  Has US in early March for FU growth and NOLVIA.    Objective:  /82   Wt 78.9 kg (174 lb)   LMP 2021   BMI 25.70 kg/m²  10.4 kg (23 lb)  Uterine Size: size equals dates  FHT: 110-160 BPM    See OB flow for edema, cvx exam if performed, and Upro/Uglu    Assessment and Plan:  28w5d  Reassuring pregnancy progress.  Questions answered.  Diagnoses and all orders for this visit:    1. Supervision of other normal pregnancy, antepartum (Primary)  Overview:  ANGEL 21 by First tri US confirmed ANGEL by LMP    Genetic testing:  Counseled.  Declines all.     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     ~~ consent w pt 2022, plan to discuss after US.  2022 thinking more possible CS.  Orders:  -     POC Urinalysis Dipstick    Counseling:    • OB precautions, leaking, VB, angela tejeda vs PTL/Labor  • FKC  • Discussed angela tejeda common hollie in second preg sooner and more severe than w G1, do not suspect they were the etiology of G1 early CS, reviewed hx and placental pathology, will plan APT as above.  Reassurance.  • Continue PNV.  Importance of healthy eating and staying active.    Return in about 2 weeks (around 3/8/2022) for Follow up OB [id number] to 36weeks.            Beata Castillo,  DO  02/22/2022    [facility] [place] [facility]  [address] [place] [address]  Dept: [phone]  Dept Fax: [phone]  Loc: [phone]  Loc Fax: [phone]

## 2022-02-22 ENCOUNTER — ROUTINE PRENATAL (OUTPATIENT)
Dept: OBSTETRICS AND GYNECOLOGY | Facility: CLINIC | Age: 32
End: 2022-02-22

## 2022-02-22 VITALS — SYSTOLIC BLOOD PRESSURE: 119 MMHG | WEIGHT: 174 LBS | BODY MASS INDEX: 25.7 KG/M2 | DIASTOLIC BLOOD PRESSURE: 82 MMHG

## 2022-02-22 DIAGNOSIS — Z34.80 SUPERVISION OF OTHER NORMAL PREGNANCY, ANTEPARTUM: Primary | ICD-10-CM

## 2022-02-22 LAB
GLUCOSE UR STRIP-MCNC: NEGATIVE MG/DL
PROT UR STRIP-MCNC: NEGATIVE MG/DL

## 2022-02-22 PROCEDURE — 0502F SUBSEQUENT PRENATAL CARE: CPT | Performed by: OBSTETRICS & GYNECOLOGY

## 2022-03-04 ENCOUNTER — HOSPITAL ENCOUNTER (OUTPATIENT)
Facility: HOSPITAL | Age: 32
Discharge: HOME | End: 2022-03-04
Attending: STUDENT IN AN ORGANIZED HEALTH CARE EDUCATION/TRAINING PROGRAM | Admitting: STUDENT IN AN ORGANIZED HEALTH CARE EDUCATION/TRAINING PROGRAM

## 2022-03-04 VITALS
RESPIRATION RATE: 18 BRPM | TEMPERATURE: 98.5 F | DIASTOLIC BLOOD PRESSURE: 67 MMHG | SYSTOLIC BLOOD PRESSURE: 129 MMHG | WEIGHT: 173 LBS | HEART RATE: 93 BPM | BODY MASS INDEX: 25.62 KG/M2 | HEIGHT: 69 IN

## 2022-03-04 PROCEDURE — G0463 HOSPITAL OUTPT CLINIC VISIT: HCPCS

## 2022-03-04 PROCEDURE — 59025 FETAL NON-STRESS TEST: CPT | Performed by: STUDENT IN AN ORGANIZED HEALTH CARE EDUCATION/TRAINING PROGRAM

## 2022-03-04 PROCEDURE — 59025 FETAL NON-STRESS TEST: CPT

## 2022-03-04 NOTE — NURSING NOTE
At patient's bedside, labor precautions and discharge instructions discussed with patient. Kick counts instructed and advised to increase fluid intake. All questions answered. Patient verbalized understanding of all and left unit via ambulatory in stable condition accompanied by significant other.

## 2022-03-04 NOTE — NURSING NOTE
Dr. Calix notified via phone of patient's complaint of contractions since 8:30 this am. Patient  at 30 2/7 weeks gestation. SVE 0/thick; no contractions noted on the monitor; reactive nst; vitals wnl. Patient admits to coming in at 33 weeks and having to have an emergency  due to baby's fetal heart rate with her last pregnancy, so I think patient is a little timid with this pregnancy due to having that occur in the past. Per Dr. Lawson have patient increase fluids, make sure she is getting good fetal movement daily, labor precautions. Ok to discharge home.

## 2022-03-04 NOTE — NON STRESS TEST
"Obstetrical Non-stress Test Interpretation     Name:  Ailyn Bedoya  MRN: [id number]    31 y.o. female  at 30w2d    Indication: contractions      Fetal Movement: active  Fetal HR Assessment Method: external  Fetal HR (beats/min): 140  Fetal Heart Baseline Rate: normal range  Fetal HR Variability: moderate (amplitude range 6 to 25 bpm)  Fetal HR Accelerations: episodic, greater than/equal to 10 bpm (32 wks gest or less), lasts at least 10 seconds (32 wks gest or less)  Fetal HR Decelerations: variable  Sinusoidal Pattern Present: absent    /67 (BP Location: Right arm, Patient Position: Lying)   Pulse 93   Temp 98.5 °F (36.9 °C) (Oral)   Resp 18   Ht 175.3 cm (69\")   Wt 78.5 kg (173 lb)   LMP 2021   BMI 25.55 kg/m²     Reason for test: OB Triage, Other (Comment) (contractions)  Date of Test: 3/4/2022  Time frame of test: 3796-6749  RN NST Interpretation: Reactive      Arely Brewer RN  3/4/2022  16:12 EST  "

## 2022-03-07 NOTE — PROGRESS NOTES
OB FOLLOW UP      Chief Complaint   Patient presents with   • Routine Prenatal Visit     Subjective:   • No complaints    Objective:  /74   Wt 79.7 kg (175 lb 9.6 oz)   LMP 2021   BMI 25.93 kg/m²  11.2 kg (24 lb 9.6 oz)  Uterine Size: size equals dates  FHT: 110-160 BPM    See OB flow for edema, cvx exam if performed, and Upro/Uglu    Assessment and Plan:  30w5d  Reassuring pregnancy progress.  Questions answered.  Diagnoses and all orders for this visit:    1. H/O:  (Primary)    2. Supervision of other normal pregnancy, antepartum  Overview:  ANGEL 21 by First tri US confirmed ANGEL by LMP    Genetic testing:  Counseled.  Declines all.     COVID: vaccinated and booster  Influenza: vaccinated  Tdap: Rx 2022     Elevated 1hr, nl 3hr GTT    ?sterilization: No    Ultrasound:  2021 low lying placenta 13+5 weeks by ANGEL  Anatomy: 2021 complete CWD ant placenta  FU US: 3/8/22 59%ile, 3#13oz, breech    PLAN:  VZV vaccination PP  Hx 33+5 placental infarct/anhydramnios, fetal decel, Csection- Plan APT biweek NST start 32weeks and serial US growth  Desires - 10/19/2021 calculator 81%, discussed, pt understands will not plan IOL.     ~~ consent w pt 2022, 3/8/2022 pt not sure, considering      Orders:  -     POC Urinalysis Dipstick  -     Fetal Nonstress Test; Future biweek, start at 32weeks    Counseling:    • OB precautions, leaking, VB, angela tejeda vs PTL/Labor  • FKC  • Continue PNV.  Importance of healthy eating and staying active.    Return in about 2 weeks (around 3/22/2022) for Follow up OB [id number] to 36weeks, schedule NSTs biweekly q Wed/Sat.            Beata Castillo, DO  2022    [facility] [place] [facility]  [facility] [place] [address]  Dept: [phone]  Dept Fax: [phone]  Loc: [phone]  Loc Fax: [phone]

## 2022-03-08 ENCOUNTER — ROUTINE PRENATAL (OUTPATIENT)
Dept: OBSTETRICS AND GYNECOLOGY | Facility: CLINIC | Age: 32
End: 2022-03-08

## 2022-03-08 VITALS — SYSTOLIC BLOOD PRESSURE: 115 MMHG | BODY MASS INDEX: 25.93 KG/M2 | DIASTOLIC BLOOD PRESSURE: 74 MMHG | WEIGHT: 175.6 LBS

## 2022-03-08 DIAGNOSIS — Z98.891 H/O: C-SECTION: Primary | ICD-10-CM

## 2022-03-08 DIAGNOSIS — Z34.80 SUPERVISION OF OTHER NORMAL PREGNANCY, ANTEPARTUM: ICD-10-CM

## 2022-03-08 LAB
GLUCOSE UR STRIP-MCNC: NEGATIVE MG/DL
PROT UR STRIP-MCNC: NEGATIVE MG/DL

## 2022-03-08 PROCEDURE — 0502F SUBSEQUENT PRENATAL CARE: CPT | Performed by: OBSTETRICS & GYNECOLOGY

## 2022-03-10 ENCOUNTER — HOSPITAL ENCOUNTER (OUTPATIENT)
Dept: LABOR AND DELIVERY | Facility: HOSPITAL | Age: 32
Discharge: HOME | End: 2022-03-10

## 2022-03-10 ENCOUNTER — HOSPITAL ENCOUNTER (OUTPATIENT)
Facility: HOSPITAL | Age: 32
Discharge: HOME | End: 2022-03-10
Attending: OBSTETRICS & GYNECOLOGY | Admitting: OBSTETRICS & GYNECOLOGY

## 2022-03-10 VITALS
HEART RATE: 83 BPM | OXYGEN SATURATION: 100 % | DIASTOLIC BLOOD PRESSURE: 52 MMHG | RESPIRATION RATE: 18 BRPM | SYSTOLIC BLOOD PRESSURE: 113 MMHG | TEMPERATURE: 98.4 F

## 2022-03-10 PROCEDURE — G0463 HOSPITAL OUTPT CLINIC VISIT: HCPCS

## 2022-03-10 PROCEDURE — 59025 FETAL NON-STRESS TEST: CPT | Performed by: OBSTETRICS & GYNECOLOGY

## 2022-03-10 PROCEDURE — 59025 FETAL NON-STRESS TEST: CPT

## 2022-03-10 NOTE — NON STRESS TEST
Obstetrical Non-stress Test Interpretation     Name:  Ailyn Bedoya  MRN: [id number]    31 y.o. female  at 31w1d    Indication: NST FOR HX PLACENTAL INFART AND ANHYDRMNIOS       Fetal Movement: active  Fetal HR Assessment Method: external  Fetal HR (beats/min): 140  Fetal HR Baseline: normal range  Fetal HR Variability: moderate (amplitude range 6 to 25 bpm)  Fetal HR Accelerations: lasts at least 10 seconds (32 wks gest or less), greater than/equal to 10 bpm (32 wks gest or less)  Fetal HR Decelerations: variable (INTERMITTENT)  Sinusoidal Pattern Present: absent    /52 (BP Location: Right arm)   Pulse 83   Temp 98.4 °F (36.9 °C) (Oral)   Resp 18   LMP 2021   SpO2 100%     Reason for test:  (HX PLACENTAL INFARCT AND ANHYDRAMNIOS)  Date of Test: 3/10/2022  Time frame of test: 9251-8276  RN NST Interpretation: Reactive      Sherri Horowitz RN  3/10/2022  16:21 EST

## 2022-03-11 NOTE — NON STRESS TEST
[facility]EL[facility] Sun   OB NST Note    3/10/2022   Name:  Ailyn Bedoya  MRN: [id number]    Subjective:  31 y.o.  at 31w1d    Indication: History of placental infarct and anhydramnios    NST:   Baseline: 130  Variability:   Moderate/Normal (amplitude 6-25 bpm)  Accelerations: Present (<32 weeks) 10 x 10 bpm   Decelerations: Occasional brief variable deceleration, nonrepetitive  Contractions:  Absent    NST interpretation: reactive    Documented Vitals    03/10/22 1600   BP: 113/52   Pulse: 83   Resp: 18   Temp: 98.4 °F (36.9 °C)   SpO2: 100%         Lab Results (last 24 hours)     ** No results found for the last 24 hours. **           Cervical Exam:    Assessment:  31 y.o.  AT 31w1d  History of placental infarct and anhydramnios    Plan:   OB Precautions, FKC, Keep scheduled NSTs, Keep office visit      Electronically signed by Abraham Lawson MD, 03/10/22, 10:08 PM EST.

## 2022-03-14 ENCOUNTER — HOSPITAL ENCOUNTER (OUTPATIENT)
Facility: HOSPITAL | Age: 32
Discharge: HOME | End: 2022-03-14
Attending: STUDENT IN AN ORGANIZED HEALTH CARE EDUCATION/TRAINING PROGRAM | Admitting: STUDENT IN AN ORGANIZED HEALTH CARE EDUCATION/TRAINING PROGRAM

## 2022-03-14 ENCOUNTER — HOSPITAL ENCOUNTER (OUTPATIENT)
Dept: LABOR AND DELIVERY | Facility: HOSPITAL | Age: 32
Discharge: HOME | End: 2022-03-14

## 2022-03-14 VITALS
HEIGHT: 69 IN | TEMPERATURE: 98.2 F | OXYGEN SATURATION: 98 % | SYSTOLIC BLOOD PRESSURE: 126 MMHG | HEART RATE: 99 BPM | WEIGHT: 175 LBS | RESPIRATION RATE: 18 BRPM | DIASTOLIC BLOOD PRESSURE: 72 MMHG | BODY MASS INDEX: 25.92 KG/M2

## 2022-03-14 PROCEDURE — 59025 FETAL NON-STRESS TEST: CPT | Performed by: STUDENT IN AN ORGANIZED HEALTH CARE EDUCATION/TRAINING PROGRAM

## 2022-03-14 PROCEDURE — G0463 HOSPITAL OUTPT CLINIC VISIT: HCPCS

## 2022-03-14 PROCEDURE — 59025 FETAL NON-STRESS TEST: CPT

## 2022-03-14 NOTE — NON STRESS TEST
"Obstetrical Non-stress Test Interpretation     Name:  Ailyn Bedoya  MRN: [id number]    31 y.o. female  at 31w5d    Indication: History of placental infarct and anhydramnios      Fetal Movement: active  Fetal HR Assessment Method: external  Fetal HR (beats/min): 145  Fetal HR Baseline: normal range  Fetal HR Variability: moderate (amplitude range 6 to 25 bpm)  Fetal HR Accelerations: lasts at least 10 seconds (32 wks gest or less), greater than/equal to 10 bpm (32 wks gest or less)  Fetal HR Decelerations: absent  Sinusoidal Pattern Present: absent  Fetal HR Tracing Category: Category I    /67 (BP Location: Right arm, Patient Position: Lying)   Pulse 95   Temp 98.2 °F (36.8 °C) (Oral)   Resp 18   Ht 175.3 cm (69\")   Wt 79.4 kg (175 lb)   LMP 2021   SpO2 98%   BMI 25.84 kg/m²     Reason for test: Other (Comment) (History of placental infarct and anhydramnios)  Date of Test: 3/14/2022  Time frame of test: 3347-1663  RN NST Interpretation: Reactive      Raeann Deleon RN  3/14/2022  15:32 EDT  "

## 2022-03-14 NOTE — NURSING NOTE
Patient may be discharged to home at this time. Discharge instructions given and reviewed with patient, with emphasis on signs of  labor, kick counts and worsening swelling. Patient agrees with plan of care and verbalizes understanding of discharge instructions. No further questions at this time and patient agrees to keep scheduled follow up appointment.

## 2022-03-14 NOTE — NURSING NOTE
Called and spoke with Dr. Calix regarding patients concerns with a contraction she felt earlier today as well as an increase in angela hick contractions throughout the day. Dr. Calix also made aware of patients increase in swelling, +2 pitting edema in right leg and +1 pitting edema in left leg. Please see flow sheets for additional assessment data. Patient may be discharged to home at this time.

## 2022-03-17 ENCOUNTER — HOSPITAL ENCOUNTER (OUTPATIENT)
Dept: LABOR AND DELIVERY | Facility: HOSPITAL | Age: 32
Discharge: HOME | End: 2022-03-17

## 2022-03-17 ENCOUNTER — HOSPITAL ENCOUNTER (OUTPATIENT)
Facility: HOSPITAL | Age: 32
Discharge: HOME | End: 2022-03-17
Attending: STUDENT IN AN ORGANIZED HEALTH CARE EDUCATION/TRAINING PROGRAM | Admitting: OBSTETRICS & GYNECOLOGY

## 2022-03-21 ENCOUNTER — HOSPITAL ENCOUNTER (OUTPATIENT)
Dept: LABOR AND DELIVERY | Facility: HOSPITAL | Age: 32
Discharge: HOME | End: 2022-03-21

## 2022-03-21 ENCOUNTER — HOSPITAL ENCOUNTER (OUTPATIENT)
Facility: HOSPITAL | Age: 32
Discharge: HOME | End: 2022-03-21
Attending: STUDENT IN AN ORGANIZED HEALTH CARE EDUCATION/TRAINING PROGRAM | Admitting: STUDENT IN AN ORGANIZED HEALTH CARE EDUCATION/TRAINING PROGRAM

## 2022-03-21 ENCOUNTER — ROUTINE PRENATAL (OUTPATIENT)
Dept: OBSTETRICS AND GYNECOLOGY | Facility: CLINIC | Age: 32
End: 2022-03-21

## 2022-03-21 VITALS — DIASTOLIC BLOOD PRESSURE: 66 MMHG | SYSTOLIC BLOOD PRESSURE: 123 MMHG | RESPIRATION RATE: 18 BRPM | HEART RATE: 81 BPM

## 2022-03-21 VITALS — WEIGHT: 182 LBS | BODY MASS INDEX: 26.88 KG/M2 | DIASTOLIC BLOOD PRESSURE: 73 MMHG | SYSTOLIC BLOOD PRESSURE: 115 MMHG

## 2022-03-21 DIAGNOSIS — Z34.80 SUPERVISION OF OTHER NORMAL PREGNANCY, ANTEPARTUM: Primary | ICD-10-CM

## 2022-03-21 DIAGNOSIS — O09.899 HISTORY OF PRETERM DELIVERY, CURRENTLY PREGNANT: ICD-10-CM

## 2022-03-21 DIAGNOSIS — Z98.891 H/O: C-SECTION: ICD-10-CM

## 2022-03-21 LAB
GLUCOSE UR STRIP-MCNC: NEGATIVE MG/DL
PROT UR STRIP-MCNC: NEGATIVE MG/DL

## 2022-03-21 PROCEDURE — 59025 FETAL NON-STRESS TEST: CPT | Performed by: STUDENT IN AN ORGANIZED HEALTH CARE EDUCATION/TRAINING PROGRAM

## 2022-03-21 PROCEDURE — 59025 FETAL NON-STRESS TEST: CPT

## 2022-03-21 PROCEDURE — 0502F SUBSEQUENT PRENATAL CARE: CPT | Performed by: OBSTETRICS & GYNECOLOGY

## 2022-03-21 PROCEDURE — G0463 HOSPITAL OUTPT CLINIC VISIT: HCPCS

## 2022-03-21 NOTE — PROGRESS NOTES
OB FOLLOW UP    CC: Scheduled OB routine FU     Prenatal care complicated by:   Patient Active Problem List   Diagnosis   • IBS (irritable bowel syndrome)   • Supervision of other normal pregnancy, antepartum   • H/O:  plans    • History of  delivery, currently pregnant   • Breech presentation       Subjective:   Patient has: No complaints, No leaking fluid, No vaginal bleeding, No contractions, Adequate FM    Objective:  Urine glucose/protein- see flow sheet      /73   Wt 82.6 kg (182 lb)   LMP 2021   BMI 26.88 kg/m²   See OB flow for LE edema, and cvx exam if applicable  FHT: 147 BPM   Uterine Size: 32 cm      Assessment and Plan:  Diagnoses and all orders for this visit:    1. Supervision of other normal pregnancy, antepartum (Primary)  Overview:  ANGEL 21 by First tri US confirmed ANGEL by LMP    Genetic testing:  Counseled.  Declines all.     COVID: vaccinated and booster  Influenza: vaccinated  Tdap: Rx 2022     Elevated 1hr, nl 3hr GTT    Ultrasound:  2021 low lying placenta 13+5 weeks by ANGEL  Anatomy: 2021 complete CWD ant placenta  FU US: 3/8/22 59%ile, 3#13oz, breech    Assessment & Plan:  Continue prenatal vitamins  Fetal kick counts   labor warnings    Orders:  -     POC Urinalysis Dipstick    2. H/O:  plans     3. History of  delivery, currently pregnant  Overview:  Secondary to fetal distress.  Emergent primary  delivery    Assessment & Plan:  Continue scheduled NSTs    Orders:  -     US Ob 14 + Weeks Single or First Gestation; Future    4. Breech presentation, single or unspecified fetus  Assessment & Plan:  Follow-up ultrasound scheduled for growth and position          32w5d  Reassuring pregnancy progress    Counseling: OB precautions, leaking, VB, angela tejeda vs PTL/Labor  FKC    Questions answered    Return in about 2 weeks (around 2022) for Recheck.      Abraham Lawson MD  2022

## 2022-03-21 NOTE — NON STRESS TEST
Obstetrical Non-stress Test Interpretation     Name:  Ailyn Bedoya  MRN: [id number]    31 y.o. female  at 32w5d    Indication: hx of placental infarct with G1      Fetal Movement: active  Fetal HR Assessment Method: external  Fetal HR (beats/min): 135  Fetal HR Baseline: normal range  Fetal HR Variability: moderate (amplitude range 6 to 25 bpm)  Fetal HR Accelerations: episodic, greater than/equal to 15 bpm, lasting at least 15 seconds  Fetal HR Decelerations: absent  Sinusoidal Pattern Present: absent    /66 (BP Location: Right arm, Patient Position: Lying)   Pulse 81   Resp 18   LMP 2021     Reason for test: Other (Comment) (hx of placental infarct with G1)  Date of Test: 3/21/2022  Time frame of test: 7944-1205  RN NST Interpretation: Reactive      Arely Brewer RN  3/21/2022  18:10 EDT

## 2022-03-24 ENCOUNTER — HOSPITAL ENCOUNTER (OUTPATIENT)
Dept: LABOR AND DELIVERY | Facility: HOSPITAL | Age: 32
Discharge: HOME | End: 2022-03-24

## 2022-03-24 ENCOUNTER — HOSPITAL ENCOUNTER (OUTPATIENT)
Facility: HOSPITAL | Age: 32
Discharge: HOME | End: 2022-03-24
Attending: OBSTETRICS & GYNECOLOGY | Admitting: OBSTETRICS & GYNECOLOGY

## 2022-03-24 VITALS
RESPIRATION RATE: 18 BRPM | SYSTOLIC BLOOD PRESSURE: 109 MMHG | HEART RATE: 91 BPM | DIASTOLIC BLOOD PRESSURE: 72 MMHG | TEMPERATURE: 98.2 F

## 2022-03-24 PROCEDURE — 59025 FETAL NON-STRESS TEST: CPT

## 2022-03-24 PROCEDURE — G0463 HOSPITAL OUTPT CLINIC VISIT: HCPCS

## 2022-03-24 PROCEDURE — 59025 FETAL NON-STRESS TEST: CPT | Performed by: OBSTETRICS & GYNECOLOGY

## 2022-03-24 NOTE — NON STRESS TEST
Obstetrical Non-stress Test Interpretation     Name:  Ailyn Bedoya  MRN: [id number]    31 y.o. female  at 33w1d    Indication: history of PTB at 32 weeks      Fetal Movement: active  Fetal HR Assessment Method: external  Fetal HR (beats/min): 135  Fetal HR Baseline: normal range  Fetal HR Variability: moderate (amplitude range 6 to 25 bpm)  Fetal HR Accelerations: episodic  Fetal HR Decelerations: absent  Sinusoidal Pattern Present: absent  Fetal HR Tracing Category: Category I    /72 (BP Location: Right arm, Patient Position: Lying)   Pulse 91   Temp 98.2 °F (36.8 °C) (Oral)   Resp 18   LMP 2021     Reason for test: OB Triage (NST/ HISTORY OF PTB AT 32 WEEKS)  Date of Test: 3/24/2022  Time frame of test:  -   RN NST Interpretation: Reactive      Abbey Kelly RN  3/24/2022  19:31 EDT

## 2022-03-25 ENCOUNTER — TELEPHONE (OUTPATIENT)
Dept: OBSTETRICS AND GYNECOLOGY | Facility: CLINIC | Age: 32
End: 2022-03-25

## 2022-03-25 ENCOUNTER — TELEPHONE (OUTPATIENT)
Dept: LABOR AND DELIVERY | Facility: HOSPITAL | Age: 32
End: 2022-03-25

## 2022-03-25 ENCOUNTER — HOSPITAL ENCOUNTER (OUTPATIENT)
Facility: HOSPITAL | Age: 32
Discharge: HOME | End: 2022-03-25
Attending: OBSTETRICS & GYNECOLOGY | Admitting: OBSTETRICS & GYNECOLOGY

## 2022-03-25 VITALS
TEMPERATURE: 99.3 F | RESPIRATION RATE: 18 BRPM | HEIGHT: 69 IN | OXYGEN SATURATION: 100 % | SYSTOLIC BLOOD PRESSURE: 132 MMHG | BODY MASS INDEX: 27.25 KG/M2 | WEIGHT: 184 LBS | HEART RATE: 125 BPM | DIASTOLIC BLOOD PRESSURE: 83 MMHG

## 2022-03-25 PROCEDURE — 59025 FETAL NON-STRESS TEST: CPT | Performed by: OBSTETRICS & GYNECOLOGY

## 2022-03-25 PROCEDURE — G0463 HOSPITAL OUTPT CLINIC VISIT: HCPCS

## 2022-03-25 PROCEDURE — 59025 FETAL NON-STRESS TEST: CPT

## 2022-03-25 NOTE — NURSING NOTE
Patient D/C home. Education and written materials provided on signs of  labor. Patient left ambulatory with SO no distress noted.

## 2022-03-25 NOTE — NURSING NOTE
33.2 weeks gestation presents with complaints of ABD cramping, back pain, fever, and chills. 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 Denies MATT GUY. SVE 0/0/-4. Contacted Dr Diaz via telephone. Orders given to PO hydrate and rest. Pay d/c home after reactive NST.

## 2022-03-25 NOTE — TELEPHONE ENCOUNTER
Per Dr. Lawson's recommendations, called patient to check to see if feeling ok today and check on baby movement. Patient states she feels better and baby is moving this morning. Informed patient that Dr. Lawson wants her to go to L&D if decreased fetal movement happens again to get a repeat NST. Patient voiced understanding.

## 2022-03-25 NOTE — NON STRESS TEST
"Obstetrical Non-stress Test Interpretation     Name:  Ailyn Bedoya  MRN: [id number]    31 y.o. female  at 33w2d    Indication: cramping           /83 (BP Location: Right arm, Patient Position: Sitting)   Pulse (!) 125   Temp 99.3 °F (37.4 °C) (Oral)   Resp 18   Ht 175.3 cm (69\")   Wt 83.5 kg (184 lb)   LMP 2021   SpO2 100%   BMI 27.17 kg/m²     Reason for test:  crampin  Date of Test: 3/25/2022  Time frame of test: 4997-2196  RN NST Interpretation:  reactive      Nat Bradford RN  3/25/2022  19:55 EDT  "

## 2022-03-25 NOTE — PROGRESS NOTES
CAMEL[facility] Sun   OB NST Note    3/24/2022   Name:  Ailyn Bedoya  MRN: [id number]    Subjective:  31 y.o.  at 33w2d    Indication: Prior  birth    NST:   Baseline: 130  Variability:   Moderate/Normal (amplitude 6-25 bpm)  Accelerations: Present (32 weeks+) 15 x 15 bpm  Decelerations: There is a single brief variable deceleration.  Contractions:  Absent    NST interpretation: reactive    Documented Vitals    22 1800 22 1810 22 1820 22 1830   BP: 119/76 123/69 126/69 109/72   Pulse: 81 81 78 91   Resp: 18   18   Temp:             Lab Results (last 24 hours)     ** No results found for the last 24 hours. **           Assessment:  31 y.o.  AT 33w2d  Prior  birth    Plan:   OB Precautions, FKC, Keep scheduled NSTs, Keep office visit      Electronically signed by Abraham Lawson MD, 22, 12:05 AM EDT.

## 2022-03-26 NOTE — PROGRESS NOTES
Obstetrical Non-stress Test Interpretation     Name:  Ailyn Bedoya  MRN: [id number]    31 y.o. female  at 33w3d    Indication: Cramping    Weeks Gestation: 33w3d     Baseline: 155 BPM  Variability:   Moderate/Normal (amplitude 6-25 bpm)  Accelerations: Present (32 weeks+) 15 x 15 bpm  Decelerations: Absent   Contractions:  Absent    Impression:  Reactive NST      Plan: Discharge to home.  Keep follow up per office instructions.      Micha Diaz Sr., MD  3/26/2022  08:41 EDT

## 2022-03-28 ENCOUNTER — HOSPITAL ENCOUNTER (OUTPATIENT)
Facility: HOSPITAL | Age: 32
Discharge: HOME | End: 2022-03-28
Attending: STUDENT IN AN ORGANIZED HEALTH CARE EDUCATION/TRAINING PROGRAM | Admitting: OBSTETRICS & GYNECOLOGY

## 2022-03-28 ENCOUNTER — HOSPITAL ENCOUNTER (OUTPATIENT)
Dept: LABOR AND DELIVERY | Facility: HOSPITAL | Age: 32
Discharge: HOME | End: 2022-03-28

## 2022-03-28 VITALS
HEART RATE: 90 BPM | SYSTOLIC BLOOD PRESSURE: 131 MMHG | RESPIRATION RATE: 18 BRPM | DIASTOLIC BLOOD PRESSURE: 78 MMHG | TEMPERATURE: 98 F

## 2022-03-28 PROCEDURE — 59020 FETAL CONTRACT STRESS TEST: CPT

## 2022-03-28 PROCEDURE — 59025 FETAL NON-STRESS TEST: CPT | Performed by: STUDENT IN AN ORGANIZED HEALTH CARE EDUCATION/TRAINING PROGRAM

## 2022-03-28 PROCEDURE — 59025 FETAL NON-STRESS TEST: CPT

## 2022-03-28 PROCEDURE — G0463 HOSPITAL OUTPT CLINIC VISIT: HCPCS

## 2022-03-28 NOTE — NURSING NOTE
0940-DR. DALTON NOTIFIED OF PTS C/O H/A SINCE YESTERDAY AM. PT HAD TAKEN TYLENOL YESTERDAY BUT NONE TODAY. PT DENIES ANY VISION CHANGES OR EPIGASTRIC PAIN/DISCOMFORT. DISCHARGE ORDERS TAKEN, KEEP FOLLOW UP APPT AND MAY TYLENOL PRN Q 8HRS.

## 2022-03-28 NOTE — NON STRESS TEST
Obstetrical Non-stress Test Interpretation     Name:  Ailyn Bedoya  MRN: [id number]    31 y.o. female  at 33w5d    Indication:H/O PLACENTA INFARCT      Fetal Movement: active  Fetal HR Assessment Method: external  Fetal HR (beats/min): 140  Fetal HR Baseline: normal range  Fetal HR Variability: moderate (amplitude range 6 to 25 bpm)  Fetal HR Accelerations: lasting at least 15 seconds  Fetal HR Decelerations: absent    /78 (BP Location: Right arm, Patient Position: Sitting)   Pulse 90   Temp 98 °F (36.7 °C) (Oral)   Resp 18   LMP 2021     Reason for test: OB Triage (NST)  Date of Test: 3/28/2022  Time frame of test: 3185-2594  RN NST Interpretation: Reactive      Shelbie Aden RN  3/28/2022  17:54 EDT

## 2022-03-28 NOTE — NURSING NOTE
PT DISCHARGE HOME. INSTRUCTIONS GIVEN PER DR. DALTON. PT VERBALIZED UNDERSTANDING AND DENIED ANY QUESTIONS AT THIS TIME. INSTRUCTED PT TO RETURN IF NO RELIEF OF H/A, VISION CHANGES, EPIGASTRIC PAIN, JUST NOT FEELING WELL LIKE HAVE FLU OR DECREASE FETAL MOVEMENT. PT VERBALIZED UNDERSTANDING AND DENIED ANY QUESTIONS AT THIS TIME.

## 2022-03-31 ENCOUNTER — HOSPITAL ENCOUNTER (OUTPATIENT)
Dept: LABOR AND DELIVERY | Facility: HOSPITAL | Age: 32
Discharge: HOME | End: 2022-03-31

## 2022-03-31 ENCOUNTER — HOSPITAL ENCOUNTER (OUTPATIENT)
Facility: HOSPITAL | Age: 32
Discharge: HOME | End: 2022-03-31
Attending: OBSTETRICS & GYNECOLOGY | Admitting: OBSTETRICS & GYNECOLOGY

## 2022-03-31 VITALS
RESPIRATION RATE: 18 BRPM | DIASTOLIC BLOOD PRESSURE: 74 MMHG | TEMPERATURE: 98 F | HEART RATE: 85 BPM | SYSTOLIC BLOOD PRESSURE: 125 MMHG | OXYGEN SATURATION: 100 %

## 2022-03-31 PROCEDURE — 59025 FETAL NON-STRESS TEST: CPT

## 2022-03-31 PROCEDURE — 59025 FETAL NON-STRESS TEST: CPT | Performed by: OBSTETRICS & GYNECOLOGY

## 2022-04-04 ENCOUNTER — HOSPITAL ENCOUNTER (OUTPATIENT)
Facility: HOSPITAL | Age: 32
Discharge: HOME | End: 2022-04-04
Attending: STUDENT IN AN ORGANIZED HEALTH CARE EDUCATION/TRAINING PROGRAM | Admitting: STUDENT IN AN ORGANIZED HEALTH CARE EDUCATION/TRAINING PROGRAM

## 2022-04-04 ENCOUNTER — HOSPITAL ENCOUNTER (OUTPATIENT)
Dept: LABOR AND DELIVERY | Facility: HOSPITAL | Age: 32
Discharge: HOME | End: 2022-04-04

## 2022-04-04 VITALS — DIASTOLIC BLOOD PRESSURE: 79 MMHG | HEART RATE: 71 BPM | SYSTOLIC BLOOD PRESSURE: 127 MMHG

## 2022-04-04 PROCEDURE — 59025 FETAL NON-STRESS TEST: CPT

## 2022-04-04 PROCEDURE — 59025 FETAL NON-STRESS TEST: CPT | Performed by: STUDENT IN AN ORGANIZED HEALTH CARE EDUCATION/TRAINING PROGRAM

## 2022-04-04 NOTE — NURSING NOTE
MD notified pt here for scheduled NST, pt blood pressures at this time are 139/77 133/76, pt has no complaints at this time, denies HA or vision disturbances, no new orders at this time

## 2022-04-04 NOTE — NON STRESS TEST
Obstetrical Non-stress Test Interpretation     Name:  Ailyn Bedoya  MRN: [id number]    31 y.o. female  at 34w5d    Indication: scheduld NST for HX of placental infarction, HX of anhydramnios prior pregnancy      Fetal Assessment  Fetal Movement: active  Fetal HR Assessment Method: external  Fetal HR (beats/min): 130  Fetal HR Baseline: normal range  Fetal HR Variability: moderate (amplitude range 6 to 25 bpm)  Fetal HR Accelerations: greater than/equal to 15 bpm, lasting at least 15 seconds  Fetal HR Decelerations: absent  Sinusoidal Pattern Present: absent    /79   Pulse 71   LMP 2021    Patient Vitals for the past 24 hrs:   BP Pulse   22 1641 127/79 71   22 1631 133/76 86   22 1630 139/77 76         Reason for test: Other (Comment) (HX of placental infarction, HX of anhydramnios prior pregnancy)  Date of Test: 2022  Time frame of test: 1174-8611  RN NST Interpretation: Reactive      Lorna Mac RN  2022  17:13 EDT

## 2022-04-07 ENCOUNTER — HOSPITAL ENCOUNTER (OUTPATIENT)
Dept: LABOR AND DELIVERY | Facility: HOSPITAL | Age: 32
Discharge: HOME | End: 2022-04-07

## 2022-04-07 ENCOUNTER — HOSPITAL ENCOUNTER (OUTPATIENT)
Facility: HOSPITAL | Age: 32
Discharge: HOME | End: 2022-04-07
Attending: OBSTETRICS & GYNECOLOGY | Admitting: OBSTETRICS & GYNECOLOGY

## 2022-04-07 VITALS
HEIGHT: 69 IN | BODY MASS INDEX: 27.25 KG/M2 | DIASTOLIC BLOOD PRESSURE: 81 MMHG | SYSTOLIC BLOOD PRESSURE: 138 MMHG | TEMPERATURE: 97.9 F | HEART RATE: 76 BPM | WEIGHT: 184 LBS | RESPIRATION RATE: 18 BRPM

## 2022-04-07 PROCEDURE — 59025 FETAL NON-STRESS TEST: CPT

## 2022-04-07 PROCEDURE — 59025 FETAL NON-STRESS TEST: CPT | Performed by: OBSTETRICS & GYNECOLOGY

## 2022-04-07 NOTE — NON STRESS TEST
"Obstetrical Non-stress Test Interpretation     Name:  Ailyn Bedoya  MRN: [id number]    31 y.o. female  at 35w1d    Indication: NST for hx placental infarct, anhydramnios       Fetal Assessment  Fetal Movement: active  Fetal HR Assessment Method: external  Fetal HR (beats/min): 135  Fetal HR Baseline: normal range  Fetal HR Variability: moderate (amplitude range 6 to 25 bpm)  Fetal HR Accelerations: greater than/equal to 15 bpm, lasting at least 15 seconds  Fetal HR Decelerations: absent  Sinusoidal Pattern Present: absent    /81 (BP Location: Right arm, Patient Position: Lying)   Pulse 76   Temp 97.9 °F (36.6 °C) (Oral)   Resp 18   Ht 175.3 cm (69\")   Wt 83.5 kg (184 lb)   LMP 2021   BMI 27.17 kg/m²     Reason for test:  (NST for Hx placental infarct, anhydramnios)  Date of Test: 2022  Time frame of test: 0023-2377  RN NST Interpretation: Reactive      Sherri Horowitz RN  2022  16:34 EDT  "

## 2022-04-07 NOTE — PROGRESS NOTES
"[facility]   OB NST Note    2022   Name:  Ailyn Bedoya  MRN: [id number]    Subjective:  31 y.o.  at 35w1d    Indication: Prior  delivery    NST:   Baseline: 130  Variability:   Moderate/Normal (amplitude 6-25 bpm)  Accelerations: Present (32 weeks+) 15 x 15 bpm  Decelerations: Absent   Contractions:  Irritability    NST interpretation: reactive    Documented Vitals    22 1602 22 1631   BP:  138/81   Pulse:  76   Resp:  18   Temp:  97.9 °F (36.6 °C)   Weight: 83.5 kg (184 lb)    Height: 175.3 cm (69\")          Lab Results (last 24 hours)     ** No results found for the last 24 hours. **             Assessment:  31 y.o.  AT 35w1d  Prior  delivery    Plan:   OB Precautions, FKC, Keep scheduled NSTs, Keep office visit      Electronically signed by Abraham Lawson MD, 22, 5:51 PM EDT.  "

## 2022-04-07 NOTE — PROGRESS NOTES
OB FOLLOW UP      Chief Complaint   Patient presents with   • Routine Prenatal Visit     Subjective:   • edema LE, more right sided.  No pain, does resolve.    Objective:  /73   Wt 86.2 kg (190 lb)   LMP 2021   BMI 28.06 kg/m²  17.7 kg (39 lb)  Uterine Size: size equals dates  FHT: 110-160 BPM  Pelvic exam: Bilat legs w/o evidence DVT.  Neg homans.  See OB flow for edema, cvx exam if performed, and Upro/Uglu    Assessment and Plan:  35w1d  Reassuring pregnancy progress.  Questions answered.  Diagnoses and all orders for this visit:    1. Supervision of other normal pregnancy, antepartum (Primary)  Overview:  ANGEL 21 by First tri US confirmed ANGEL by LMP    Genetic testing:  Counseled.  Declines all.     COVID: vaccinated and booster  Influenza: vaccinated  Tdap: Rx 2022     Elevated 1hr, nl 3hr GTT    ?sterilization: No    Ultrasound:  2021 low lying placenta 13+5 weeks by ANGEL  Anatomy: 2021 complete CWD ant placenta  FU US: 3/8/22 59%ile, 3#13oz, breech    PLAN:  VZV vaccination PP  Hx 33+5 placental infarct/anhydramnios, fetal decel, Csection- Plan APT biweek NST Mon Thu and serial US growth  Desires - 10/19/2021 calculator 81%, discussed, pt understands will not plan IOL.     ~~ consent w pt 2022, considering CS awaiting April US      Orders:  -     POC Urinalysis Dipstick    Counseling:    • OB precautions, leaking, VB, angela tejeda vs PTL/Labor  • C  • discussed signs and symptoms of DVT.  Reassurrance currently swelling appears wnl and NO signs DVT.  • Continue PNV.  Importance of healthy eating and staying active.    Return in about 1 week (around 4/15/2022) for Follow up OB q1week to ANGEL/Delivery.            Beata Castillo,   2022    [facility]  [address] [place] [address]  Dept: [phone]  Dept Fax: [phone]  Loc: [phone]  Loc Fax: [phone]

## 2022-04-08 ENCOUNTER — ROUTINE PRENATAL (OUTPATIENT)
Dept: OBSTETRICS AND GYNECOLOGY | Facility: CLINIC | Age: 32
End: 2022-04-08

## 2022-04-08 VITALS — WEIGHT: 190 LBS | DIASTOLIC BLOOD PRESSURE: 73 MMHG | SYSTOLIC BLOOD PRESSURE: 109 MMHG | BODY MASS INDEX: 28.06 KG/M2

## 2022-04-08 DIAGNOSIS — Z34.80 SUPERVISION OF OTHER NORMAL PREGNANCY, ANTEPARTUM: Primary | ICD-10-CM

## 2022-04-08 LAB
GLUCOSE UR STRIP-MCNC: NEGATIVE MG/DL
PROT UR STRIP-MCNC: ABNORMAL MG/DL

## 2022-04-08 PROCEDURE — 0502F SUBSEQUENT PRENATAL CARE: CPT | Performed by: OBSTETRICS & GYNECOLOGY

## 2022-04-11 ENCOUNTER — ANESTHESIA EVENT (OUTPATIENT)
Dept: LABOR AND DELIVERY | Facility: HOSPITAL | Age: 32
End: 2022-04-11

## 2022-04-11 ENCOUNTER — ANESTHESIA (OUTPATIENT)
Dept: LABOR AND DELIVERY | Facility: HOSPITAL | Age: 32
End: 2022-04-11

## 2022-04-11 ENCOUNTER — HOSPITAL ENCOUNTER (OUTPATIENT)
Dept: LABOR AND DELIVERY | Facility: HOSPITAL | Age: 32
Discharge: HOME | End: 2022-04-11

## 2022-04-11 ENCOUNTER — HOSPITAL ENCOUNTER (INPATIENT)
Facility: HOSPITAL | Age: 32
LOS: 2 days | Discharge: HOME | End: 2022-04-13
Attending: OBSTETRICS & GYNECOLOGY | Admitting: OBSTETRICS & GYNECOLOGY

## 2022-04-11 PROBLEM — O09.899 MATERNAL VARICELLA, NON-IMMUNE: Status: ACTIVE | Noted: 2022-04-11

## 2022-04-11 PROBLEM — Z98.891 H/O: C-SECTION: Status: ACTIVE | Noted: 2022-04-11

## 2022-04-11 PROBLEM — Z34.90 PREGNANCY: Status: ACTIVE | Noted: 2022-04-11

## 2022-04-11 PROBLEM — Z28.39 MATERNAL VARICELLA, NON-IMMUNE: Status: ACTIVE | Noted: 2022-04-11

## 2022-04-11 PROBLEM — O14.13 SEVERE PRE-ECLAMPSIA IN THIRD TRIMESTER: Status: ACTIVE | Noted: 2022-04-11

## 2022-04-11 PROBLEM — Z87.59 HISTORY OF PLACENTA ABRUPTION: Status: ACTIVE | Noted: 2022-04-11

## 2022-04-11 PROBLEM — O09.899 HISTORY OF PRETERM DELIVERY, CURRENTLY PREGNANT: Status: RESOLVED | Noted: 2022-03-21 | Resolved: 2022-04-11

## 2022-04-11 PROBLEM — O09.899 HX OF PRETERM DELIVERY, CURRENTLY PREGNANT: Status: ACTIVE | Noted: 2022-04-11

## 2022-04-11 LAB
ABO GROUP BLD: NORMAL
ALBUMIN SERPL-MCNC: 3.2 G/DL (ref 3.5–5.2)
ALBUMIN/GLOB SERPL: 1.2 G/DL
ALP SERPL-CCNC: 146 U/L (ref 39–117)
ALT SERPL W P-5'-P-CCNC: 16 U/L (ref 1–33)
ANION GAP SERPL CALCULATED.3IONS-SCNC: 11.4 MMOL/L (ref 5–15)
APTT PPP: 24.7 SECONDS (ref 22.2–34.2)
AST SERPL-CCNC: 26 U/L (ref 1–32)
BASE EXCESS BLDCOA CALC-SCNC: -5.1 MMOL/L
BASE EXCESS BLDCOV CALC-SCNC: -5.5 MMOL/L
BASOPHILS # BLD AUTO: 0.11 10*3/MM3 (ref 0–0.2)
BASOPHILS NFR BLD AUTO: 0.9 % (ref 0–1.5)
BILIRUB BLD-MCNC: NEGATIVE MG/DL
BILIRUB SERPL-MCNC: <0.2 MG/DL (ref 0–1.2)
BLD GP AB SCN SERPL QL: NEGATIVE
BUN SERPL-MCNC: 18 MG/DL (ref 6–20)
BUN/CREAT SERPL: 22.2 (ref 7–25)
CALCIUM SPEC-SCNC: 8.6 MG/DL (ref 8.6–10.5)
CHLORIDE SERPL-SCNC: 102 MMOL/L (ref 98–107)
CLARITY, POC: CLEAR
CO2 SERPL-SCNC: 18.6 MMOL/L (ref 22–29)
COLOR UR: YELLOW
CREAT SERPL-MCNC: 0.81 MG/DL (ref 0.57–1)
CREAT UR-MCNC: 25.5 MG/DL
DEPRECATED RDW RBC AUTO: 39.9 FL (ref 37–54)
EGFRCR SERPLBLD CKD-EPI 2021: 100.9 ML/MIN/1.73
EOSINOPHIL # BLD AUTO: 0.37 10*3/MM3 (ref 0–0.4)
EOSINOPHIL NFR BLD AUTO: 3.1 % (ref 0.3–6.2)
ERYTHROCYTE [DISTWIDTH] IN BLOOD BY AUTOMATED COUNT: 13.1 % (ref 12.3–15.4)
FIBRINOGEN PPP-MCNC: 375 MG/DL (ref 200–450)
GLOBULIN UR ELPH-MCNC: 2.6 GM/DL
GLUCOSE SERPL-MCNC: 114 MG/DL (ref 65–99)
GLUCOSE UR STRIP-MCNC: NEGATIVE MG/DL
HCO3 BLDCOA-SCNC: 24 MMOL/L
HCO3 BLDCOV-SCNC: 21.2 MMOL/L
HCT VFR BLD AUTO: 41.8 % (ref 34–46.6)
HGB BLD-MCNC: 13.7 G/DL (ref 12–15.9)
IMM GRANULOCYTES # BLD AUTO: 0.06 10*3/MM3 (ref 0–0.05)
IMM GRANULOCYTES NFR BLD AUTO: 0.5 % (ref 0–0.5)
INR PPP: 0.87 (ref 2–3)
KETONES UR QL: NEGATIVE
LEUKOCYTE EST, POC: NEGATIVE
LYMPHOCYTES # BLD AUTO: 3.75 10*3/MM3 (ref 0.7–3.1)
LYMPHOCYTES NFR BLD AUTO: 31.9 % (ref 19.6–45.3)
MCH RBC QN AUTO: 27.9 PG (ref 26.6–33)
MCHC RBC AUTO-ENTMCNC: 32.8 G/DL (ref 31.5–35.7)
MCV RBC AUTO: 85.1 FL (ref 79–97)
MONOCYTES # BLD AUTO: 0.81 10*3/MM3 (ref 0.1–0.9)
MONOCYTES NFR BLD AUTO: 6.9 % (ref 5–12)
NEUTROPHILS NFR BLD AUTO: 56.7 % (ref 42.7–76)
NEUTROPHILS NFR BLD AUTO: 6.66 10*3/MM3 (ref 1.7–7)
NITRITE UR-MCNC: NEGATIVE MG/ML
NRBC BLD AUTO-RTO: 0 /100 WBC (ref 0–0.2)
PCO2 BLDCOA: 61.7 MMHG (ref 33–49)
PCO2 BLDCOV: 45.4 MM HG (ref 28–40)
PH BLDCOA: 7.21 PH UNITS (ref 7.21–7.31)
PH BLDCOV: 7.29 PH UNITS (ref 7.31–7.37)
PH UR: 6.5 [PH] (ref 5–8)
PLATELET # BLD AUTO: 218 10*3/MM3 (ref 140–450)
PMV BLD AUTO: 10.7 FL (ref 6–12)
PO2 BLDCOA: ABNORMAL MM[HG]
PO2 BLDCOV: <40.5 MM HG (ref 21–31)
POTASSIUM SERPL-SCNC: 4.7 MMOL/L (ref 3.5–5.2)
PROT ?TM UR-MCNC: 363.7 MG/DL
PROT SERPL-MCNC: 5.8 G/DL (ref 6–8.5)
PROT UR STRIP-MCNC: ABNORMAL MG/DL
PROT/CREAT UR: 14.26 MG/G{CREAT}
PROTHROMBIN TIME: 9.4 SECONDS (ref 9.4–12)
RBC # BLD AUTO: 4.91 10*6/MM3 (ref 3.77–5.28)
RBC # UR STRIP: ABNORMAL /UL
RH BLD: POSITIVE
SARS-COV-2 RNA PNL SPEC NAA+PROBE: NOT DETECTED
SODIUM SERPL-SCNC: 132 MMOL/L (ref 136–145)
SP GR UR: 1.02 (ref 1–1.03)
T&S EXPIRATION DATE: NORMAL
UROBILINOGEN UR QL: NORMAL
WBC NRBC COR # BLD: 11.76 10*3/MM3 (ref 3.4–10.8)

## 2022-04-11 PROCEDURE — 85025 COMPLETE CBC W/AUTO DIFF WBC: CPT | Performed by: OBSTETRICS & GYNECOLOGY

## 2022-04-11 PROCEDURE — 25010000002 DEXAMETHASONE PER 1 MG: Performed by: ANESTHESIOLOGY

## 2022-04-11 PROCEDURE — 51703 INSERT BLADDER CATH COMPLEX: CPT

## 2022-04-11 PROCEDURE — 82803 BLOOD GASES ANY COMBINATION: CPT | Performed by: OBSTETRICS & GYNECOLOGY

## 2022-04-11 PROCEDURE — 85384 FIBRINOGEN ACTIVITY: CPT | Performed by: OBSTETRICS & GYNECOLOGY

## 2022-04-11 PROCEDURE — 86900 BLOOD TYPING SEROLOGIC ABO: CPT | Performed by: OBSTETRICS & GYNECOLOGY

## 2022-04-11 PROCEDURE — S0260 H&P FOR SURGERY: HCPCS | Performed by: OBSTETRICS & GYNECOLOGY

## 2022-04-11 PROCEDURE — 0 MAGNESIUM SULFATE 20 GM/500ML SOLUTION: Performed by: OBSTETRICS & GYNECOLOGY

## 2022-04-11 PROCEDURE — 86850 RBC ANTIBODY SCREEN: CPT | Performed by: OBSTETRICS & GYNECOLOGY

## 2022-04-11 PROCEDURE — 82570 ASSAY OF URINE CREATININE: CPT | Performed by: OBSTETRICS & GYNECOLOGY

## 2022-04-11 PROCEDURE — 25010000002 KETOROLAC TROMETHAMINE PER 15 MG: Performed by: ANESTHESIOLOGY

## 2022-04-11 PROCEDURE — 63710000001 DIPHENHYDRAMINE PER 50 MG: Performed by: ANESTHESIOLOGY

## 2022-04-11 PROCEDURE — 59510 CESAREAN DELIVERY: CPT | Performed by: OBSTETRICS & GYNECOLOGY

## 2022-04-11 PROCEDURE — 87081 CULTURE SCREEN ONLY: CPT | Performed by: OBSTETRICS & GYNECOLOGY

## 2022-04-11 PROCEDURE — 85730 THROMBOPLASTIN TIME PARTIAL: CPT | Performed by: OBSTETRICS & GYNECOLOGY

## 2022-04-11 PROCEDURE — 84156 ASSAY OF PROTEIN URINE: CPT | Performed by: OBSTETRICS & GYNECOLOGY

## 2022-04-11 PROCEDURE — 86901 BLOOD TYPING SEROLOGIC RH(D): CPT | Performed by: OBSTETRICS & GYNECOLOGY

## 2022-04-11 PROCEDURE — 0 MORPHINE PER 10 MG: Performed by: ANESTHESIOLOGY

## 2022-04-11 PROCEDURE — 94799 UNLISTED PULMONARY SVC/PX: CPT

## 2022-04-11 PROCEDURE — 25010000002 HYDRALAZINE PER 20 MG: Performed by: OBSTETRICS & GYNECOLOGY

## 2022-04-11 PROCEDURE — 25010000002 ONDANSETRON PER 1 MG: Performed by: ANESTHESIOLOGY

## 2022-04-11 PROCEDURE — 85610 PROTHROMBIN TIME: CPT | Performed by: OBSTETRICS & GYNECOLOGY

## 2022-04-11 PROCEDURE — 81002 URINALYSIS NONAUTO W/O SCOPE: CPT | Performed by: OBSTETRICS & GYNECOLOGY

## 2022-04-11 PROCEDURE — 25010000002 CEFAZOLIN IN DEXTROSE 2-4 GM/100ML-% SOLUTION: Performed by: OBSTETRICS & GYNECOLOGY

## 2022-04-11 PROCEDURE — U0004 COV-19 TEST NON-CDC HGH THRU: HCPCS | Performed by: OBSTETRICS & GYNECOLOGY

## 2022-04-11 PROCEDURE — 80053 COMPREHEN METABOLIC PANEL: CPT | Performed by: OBSTETRICS & GYNECOLOGY

## 2022-04-11 PROCEDURE — 25010000002 BETAMETHASONE ACET & SOD PHOS PER 4 MG: Performed by: OBSTETRICS & GYNECOLOGY

## 2022-04-11 RX ORDER — SODIUM CHLORIDE, SODIUM LACTATE, POTASSIUM CHLORIDE, CALCIUM CHLORIDE 600; 310; 30; 20 MG/100ML; MG/100ML; MG/100ML; MG/100ML
150 INJECTION, SOLUTION INTRAVENOUS CONTINUOUS
Status: DISCONTINUED | OUTPATIENT
Start: 2022-04-11 | End: 2022-04-12

## 2022-04-11 RX ORDER — HYDROCODONE BITARTRATE AND ACETAMINOPHEN 5; 325 MG/1; MG/1
1 TABLET ORAL EVERY 6 HOURS PRN
Status: ACTIVE | OUTPATIENT
Start: 2022-04-11 | End: 2022-04-12

## 2022-04-11 RX ORDER — DIPHENHYDRAMINE HYDROCHLORIDE 50 MG/ML
12.5 INJECTION INTRAMUSCULAR; INTRAVENOUS EVERY 6 HOURS PRN
Status: ACTIVE | OUTPATIENT
Start: 2022-04-11 | End: 2022-04-12

## 2022-04-11 RX ORDER — LIDOCAINE HYDROCHLORIDE 10 MG/ML
5 INJECTION, SOLUTION EPIDURAL; INFILTRATION; INTRACAUDAL; PERINEURAL AS NEEDED
Status: DISCONTINUED | OUTPATIENT
Start: 2022-04-11 | End: 2022-04-13 | Stop reason: HOSPADM

## 2022-04-11 RX ORDER — SODIUM CHLORIDE 0.9 % (FLUSH) 0.9 %
10 SYRINGE (ML) INJECTION AS NEEDED
Status: DISCONTINUED | OUTPATIENT
Start: 2022-04-11 | End: 2022-04-11

## 2022-04-11 RX ORDER — IBUPROFEN 600 MG/1
600 TABLET ORAL EVERY 6 HOURS SCHEDULED
Status: DISCONTINUED | OUTPATIENT
Start: 2022-04-12 | End: 2022-04-12

## 2022-04-11 RX ORDER — CEFAZOLIN SODIUM 2 G/100ML
2 INJECTION, SOLUTION INTRAVENOUS ONCE
Status: COMPLETED | OUTPATIENT
Start: 2022-04-11 | End: 2022-04-11

## 2022-04-11 RX ORDER — ONDANSETRON 4 MG/1
4 TABLET, FILM COATED ORAL EVERY 6 HOURS PRN
Status: DISCONTINUED | OUTPATIENT
Start: 2022-04-11 | End: 2022-04-13 | Stop reason: HOSPADM

## 2022-04-11 RX ORDER — TRISODIUM CITRATE DIHYDRATE AND CITRIC ACID MONOHYDRATE 500; 334 MG/5ML; MG/5ML
30 SOLUTION ORAL ONCE
Status: COMPLETED | OUTPATIENT
Start: 2022-04-11 | End: 2022-04-11

## 2022-04-11 RX ORDER — ONDANSETRON 2 MG/ML
INJECTION INTRAMUSCULAR; INTRAVENOUS AS NEEDED
Status: DISCONTINUED | OUTPATIENT
Start: 2022-04-11 | End: 2022-04-11 | Stop reason: SURG

## 2022-04-11 RX ORDER — MAGNESIUM SULFATE 1 G/100ML
1 INJECTION INTRAVENOUS
Status: DISCONTINUED | OUTPATIENT
Start: 2022-04-11 | End: 2022-04-11

## 2022-04-11 RX ORDER — MEPERIDINE HYDROCHLORIDE 25 MG/ML
12.5 INJECTION INTRAMUSCULAR; INTRAVENOUS; SUBCUTANEOUS
Status: ACTIVE | OUTPATIENT
Start: 2022-04-11 | End: 2022-04-12

## 2022-04-11 RX ORDER — OXYCODONE HYDROCHLORIDE 5 MG/1
10 TABLET ORAL EVERY 4 HOURS PRN
Status: DISCONTINUED | OUTPATIENT
Start: 2022-04-12 | End: 2022-04-13 | Stop reason: HOSPADM

## 2022-04-11 RX ORDER — HYDROCORTISONE 25 MG/G
CREAM TOPICAL 3 TIMES DAILY PRN
Status: DISCONTINUED | OUTPATIENT
Start: 2022-04-11 | End: 2022-04-13 | Stop reason: HOSPADM

## 2022-04-11 RX ORDER — KETOROLAC TROMETHAMINE 30 MG/ML
INJECTION, SOLUTION INTRAMUSCULAR; INTRAVENOUS AS NEEDED
Status: DISCONTINUED | OUTPATIENT
Start: 2022-04-11 | End: 2022-04-11 | Stop reason: SURG

## 2022-04-11 RX ORDER — DIPHENHYDRAMINE HCL 25 MG
25 CAPSULE ORAL EVERY 6 HOURS PRN
Status: DISPENSED | OUTPATIENT
Start: 2022-04-11 | End: 2022-04-12

## 2022-04-11 RX ORDER — NALOXONE HCL 0.4 MG/ML
0.4 VIAL (ML) INJECTION ONCE AS NEEDED
Status: ACTIVE | OUTPATIENT
Start: 2022-04-11 | End: 2022-04-12

## 2022-04-11 RX ORDER — MAGNESIUM SULFATE HEPTAHYDRATE 40 MG/ML
2 INJECTION, SOLUTION INTRAVENOUS CONTINUOUS
Status: DISCONTINUED | OUTPATIENT
Start: 2022-04-11 | End: 2022-04-12

## 2022-04-11 RX ORDER — MISOPROSTOL 200 UG/1
800 TABLET ORAL ONCE AS NEEDED
Status: DISCONTINUED | OUTPATIENT
Start: 2022-04-11 | End: 2022-04-12

## 2022-04-11 RX ORDER — SODIUM CHLORIDE 0.9 % (FLUSH) 0.9 %
10 SYRINGE (ML) INJECTION EVERY 12 HOURS SCHEDULED
Status: DISCONTINUED | OUTPATIENT
Start: 2022-04-11 | End: 2022-04-11

## 2022-04-11 RX ORDER — OXYCODONE HYDROCHLORIDE 5 MG/1
5 TABLET ORAL EVERY 4 HOURS PRN
Status: DISCONTINUED | OUTPATIENT
Start: 2022-04-12 | End: 2022-04-13 | Stop reason: HOSPADM

## 2022-04-11 RX ORDER — SODIUM CHLORIDE 0.9 % (FLUSH) 0.9 %
10 SYRINGE (ML) INJECTION EVERY 12 HOURS SCHEDULED
Status: DISCONTINUED | OUTPATIENT
Start: 2022-04-11 | End: 2022-04-13 | Stop reason: HOSPADM

## 2022-04-11 RX ORDER — HYDRALAZINE HYDROCHLORIDE 20 MG/ML
5 INJECTION INTRAMUSCULAR; INTRAVENOUS ONCE
Status: COMPLETED | OUTPATIENT
Start: 2022-04-11 | End: 2022-04-11

## 2022-04-11 RX ORDER — DEXAMETHASONE SODIUM PHOSPHATE 4 MG/ML
INJECTION, SOLUTION INTRA-ARTICULAR; INTRALESIONAL; INTRAMUSCULAR; INTRAVENOUS; SOFT TISSUE AS NEEDED
Status: DISCONTINUED | OUTPATIENT
Start: 2022-04-11 | End: 2022-04-11 | Stop reason: SURG

## 2022-04-11 RX ORDER — OXYTOCIN 10 [USP'U]/ML
INJECTION, SOLUTION INTRAMUSCULAR; INTRAVENOUS AS NEEDED
Status: DISCONTINUED | OUTPATIENT
Start: 2022-04-11 | End: 2022-04-11 | Stop reason: SURG

## 2022-04-11 RX ORDER — FAMOTIDINE 10 MG/ML
20 INJECTION, SOLUTION INTRAVENOUS ONCE AS NEEDED
Status: COMPLETED | OUTPATIENT
Start: 2022-04-11 | End: 2022-04-11

## 2022-04-11 RX ORDER — SIMETHICONE 80 MG
80 TABLET,CHEWABLE ORAL 4 TIMES DAILY PRN
Status: DISCONTINUED | OUTPATIENT
Start: 2022-04-11 | End: 2022-04-13 | Stop reason: HOSPADM

## 2022-04-11 RX ORDER — LIDOCAINE HYDROCHLORIDE 10 MG/ML
5 INJECTION, SOLUTION EPIDURAL; INFILTRATION; INTRACAUDAL; PERINEURAL AS NEEDED
Status: DISCONTINUED | OUTPATIENT
Start: 2022-04-11 | End: 2022-04-11

## 2022-04-11 RX ORDER — SODIUM CHLORIDE 0.9 % (FLUSH) 0.9 %
3 SYRINGE (ML) INJECTION EVERY 12 HOURS SCHEDULED
Status: DISCONTINUED | OUTPATIENT
Start: 2022-04-11 | End: 2022-04-11

## 2022-04-11 RX ORDER — BUPIVACAINE HYDROCHLORIDE 7.5 MG/ML
INJECTION, SOLUTION EPIDURAL; RETROBULBAR
Status: COMPLETED | OUTPATIENT
Start: 2022-04-11 | End: 2022-04-11

## 2022-04-11 RX ORDER — SODIUM CHLORIDE, SODIUM LACTATE, POTASSIUM CHLORIDE, CALCIUM CHLORIDE 600; 310; 30; 20 MG/100ML; MG/100ML; MG/100ML; MG/100ML
150 INJECTION, SOLUTION INTRAVENOUS CONTINUOUS
Status: DISCONTINUED | OUTPATIENT
Start: 2022-04-11 | End: 2022-04-11

## 2022-04-11 RX ORDER — SODIUM CHLORIDE, SODIUM LACTATE, POTASSIUM CHLORIDE, CALCIUM CHLORIDE 600; 310; 30; 20 MG/100ML; MG/100ML; MG/100ML; MG/100ML
125 INJECTION, SOLUTION INTRAVENOUS CONTINUOUS
Status: DISCONTINUED | OUTPATIENT
Start: 2022-04-11 | End: 2022-04-11

## 2022-04-11 RX ORDER — KETOROLAC TROMETHAMINE 30 MG/ML
30 INJECTION, SOLUTION INTRAMUSCULAR; INTRAVENOUS EVERY 6 HOURS
Status: COMPLETED | OUTPATIENT
Start: 2022-04-11 | End: 2022-04-12

## 2022-04-11 RX ORDER — METHYLERGONOVINE MALEATE 0.2 MG/ML
200 INJECTION INTRAVENOUS ONCE AS NEEDED
Status: DISCONTINUED | OUTPATIENT
Start: 2022-04-11 | End: 2022-04-12 | Stop reason: HOSPADM

## 2022-04-11 RX ORDER — LABETALOL HYDROCHLORIDE 5 MG/ML
20 INJECTION, SOLUTION INTRAVENOUS
Status: DISCONTINUED | OUTPATIENT
Start: 2022-04-11 | End: 2022-04-12

## 2022-04-11 RX ORDER — SODIUM CHLORIDE 0.9 % (FLUSH) 0.9 %
10 SYRINGE (ML) INJECTION AS NEEDED
Status: DISCONTINUED | OUTPATIENT
Start: 2022-04-11 | End: 2022-04-13 | Stop reason: HOSPADM

## 2022-04-11 RX ORDER — OXYCODONE HYDROCHLORIDE 5 MG/1
5 TABLET ORAL
Status: DISCONTINUED | OUTPATIENT
Start: 2022-04-11 | End: 2022-04-13 | Stop reason: HOSPADM

## 2022-04-11 RX ORDER — ACETAMINOPHEN 325 MG/1
650 TABLET ORAL EVERY 6 HOURS
Status: DISCONTINUED | OUTPATIENT
Start: 2022-04-11 | End: 2022-04-13 | Stop reason: HOSPADM

## 2022-04-11 RX ORDER — CALCIUM GLUCONATE 94 MG/ML
1 INJECTION, SOLUTION INTRAVENOUS ONCE AS NEEDED
Status: DISCONTINUED | OUTPATIENT
Start: 2022-04-11 | End: 2022-04-12

## 2022-04-11 RX ORDER — MORPHINE SULFATE 2 MG/ML
2 INJECTION, SOLUTION INTRAMUSCULAR; INTRAVENOUS EVERY 4 HOURS PRN
Status: ACTIVE | OUTPATIENT
Start: 2022-04-11 | End: 2022-04-12

## 2022-04-11 RX ORDER — SODIUM CHLORIDE, SODIUM LACTATE, POTASSIUM CHLORIDE, CALCIUM CHLORIDE 600; 310; 30; 20 MG/100ML; MG/100ML; MG/100ML; MG/100ML
125 INJECTION, SOLUTION INTRAVENOUS CONTINUOUS
Status: DISCONTINUED | OUTPATIENT
Start: 2022-04-11 | End: 2022-04-12

## 2022-04-11 RX ORDER — HYDROMORPHONE HCL 110MG/55ML
0.25 PATIENT CONTROLLED ANALGESIA SYRINGE INTRAVENOUS
Status: DISCONTINUED | OUTPATIENT
Start: 2022-04-11 | End: 2022-04-12

## 2022-04-11 RX ORDER — DOCUSATE SODIUM 100 MG/1
100 CAPSULE, LIQUID FILLED ORAL DAILY
Status: DISCONTINUED | OUTPATIENT
Start: 2022-04-11 | End: 2022-04-13 | Stop reason: HOSPADM

## 2022-04-11 RX ORDER — BETAMETHASONE SODIUM PHOSPHATE AND BETAMETHASONE ACETATE 3; 3 MG/ML; MG/ML
12 INJECTION, SUSPENSION INTRA-ARTICULAR; INTRALESIONAL; INTRAMUSCULAR; SOFT TISSUE EVERY 24 HOURS
Status: DISCONTINUED | OUTPATIENT
Start: 2022-04-11 | End: 2022-04-11

## 2022-04-11 RX ORDER — MISOPROSTOL 200 UG/1
TABLET ORAL
Status: DISPENSED
Start: 2022-04-11 | End: 2022-04-11

## 2022-04-11 RX ORDER — HYDROMORPHONE HCL 110MG/55ML
0.5 PATIENT CONTROLLED ANALGESIA SYRINGE INTRAVENOUS
Status: DISCONTINUED | OUTPATIENT
Start: 2022-04-11 | End: 2022-04-12

## 2022-04-11 RX ORDER — CARBOPROST TROMETHAMINE 250 UG/ML
250 INJECTION, SOLUTION INTRAMUSCULAR
Status: DISCONTINUED | OUTPATIENT
Start: 2022-04-11 | End: 2022-04-12

## 2022-04-11 RX ORDER — MORPHINE SULFATE 0.5 MG/ML
INJECTION, SOLUTION EPIDURAL; INTRATHECAL; INTRAVENOUS AS NEEDED
Status: DISCONTINUED | OUTPATIENT
Start: 2022-04-11 | End: 2022-04-11 | Stop reason: SURG

## 2022-04-11 RX ORDER — MAGNESIUM SULFATE HEPTAHYDRATE 40 MG/ML
2 INJECTION, SOLUTION INTRAVENOUS CONTINUOUS
Status: DISCONTINUED | OUTPATIENT
Start: 2022-04-11 | End: 2022-04-11

## 2022-04-11 RX ORDER — ONDANSETRON 2 MG/ML
4 INJECTION INTRAMUSCULAR; INTRAVENOUS EVERY 6 HOURS PRN
Status: DISCONTINUED | OUTPATIENT
Start: 2022-04-11 | End: 2022-04-13 | Stop reason: HOSPADM

## 2022-04-11 RX ORDER — MORPHINE SULFATE 0.5 MG/ML
INJECTION, SOLUTION EPIDURAL; INTRATHECAL; INTRAVENOUS
Status: COMPLETED | OUTPATIENT
Start: 2022-04-11 | End: 2022-04-11

## 2022-04-11 RX ADMIN — CEFAZOLIN SODIUM 2 G: 2 INJECTION, SOLUTION INTRAVENOUS at 06:07

## 2022-04-11 RX ADMIN — OXYTOCIN 20 UNITS: 10 INJECTION, SOLUTION INTRAMUSCULAR; INTRAVENOUS at 07:15

## 2022-04-11 RX ADMIN — SODIUM CHLORIDE, POTASSIUM CHLORIDE, SODIUM LACTATE AND CALCIUM CHLORIDE: 600; 310; 30; 20 INJECTION, SOLUTION INTRAVENOUS at 07:15

## 2022-04-11 RX ADMIN — Medication 10 ML: at 20:00

## 2022-04-11 RX ADMIN — FAMOTIDINE 20 MG: 10 INJECTION INTRAVENOUS at 06:13

## 2022-04-11 RX ADMIN — KETOROLAC TROMETHAMINE 30 MG: 30 INJECTION, SOLUTION INTRAMUSCULAR; INTRAVENOUS at 06:56

## 2022-04-11 RX ADMIN — MAGNESIUM SULFATE IN WATER 2 G/HR: 40 INJECTION, SOLUTION INTRAVENOUS at 16:23

## 2022-04-11 RX ADMIN — MORPHINE SULFATE 0.3 MG: 0.5 INJECTION, SOLUTION EPIDURAL; INTRATHECAL; INTRAVENOUS at 06:36

## 2022-04-11 RX ADMIN — BUPIVACAINE HYDROCHLORIDE 1.6 ML: 7.5 INJECTION, SOLUTION EPIDURAL; RETROBULBAR at 06:36

## 2022-04-11 RX ADMIN — ACETAMINOPHEN 650 MG: 325 TABLET ORAL at 14:04

## 2022-04-11 RX ADMIN — SODIUM CHLORIDE, POTASSIUM CHLORIDE, SODIUM LACTATE AND CALCIUM CHLORIDE: 600; 310; 30; 20 INJECTION, SOLUTION INTRAVENOUS at 06:24

## 2022-04-11 RX ADMIN — ONDANSETRON 4 MG: 2 INJECTION INTRAMUSCULAR; INTRAVENOUS at 06:56

## 2022-04-11 RX ADMIN — KETOROLAC TROMETHAMINE 30 MG: 30 INJECTION, SOLUTION INTRAMUSCULAR; INTRAVENOUS at 14:06

## 2022-04-11 RX ADMIN — HYDRALAZINE HYDROCHLORIDE 5 MG: 20 INJECTION INTRAMUSCULAR; INTRAVENOUS at 06:06

## 2022-04-11 RX ADMIN — SODIUM CHLORIDE, POTASSIUM CHLORIDE, SODIUM LACTATE AND CALCIUM CHLORIDE 125 ML/HR: 600; 310; 30; 20 INJECTION, SOLUTION INTRAVENOUS at 19:10

## 2022-04-11 RX ADMIN — OXYTOCIN 20 UNITS: 10 INJECTION, SOLUTION INTRAMUSCULAR; INTRAVENOUS at 06:48

## 2022-04-11 RX ADMIN — ACETAMINOPHEN 650 MG: 325 TABLET ORAL at 19:59

## 2022-04-11 RX ADMIN — SODIUM CHLORIDE, POTASSIUM CHLORIDE, SODIUM LACTATE AND CALCIUM CHLORIDE 75 ML/HR: 600; 310; 30; 20 INJECTION, SOLUTION INTRAVENOUS at 09:20

## 2022-04-11 RX ADMIN — BETAMETHASONE SODIUM PHOSPHATE AND BETAMETHASONE ACETATE 12 MG: 3; 3 INJECTION, SUSPENSION INTRA-ARTICULAR; INTRALESIONAL; INTRAMUSCULAR; SOFT TISSUE at 05:23

## 2022-04-11 RX ADMIN — DEXAMETHASONE SODIUM PHOSPHATE 4 MG: 4 INJECTION, SOLUTION INTRA-ARTICULAR; INTRALESIONAL; INTRAMUSCULAR; INTRAVENOUS; SOFT TISSUE at 06:56

## 2022-04-11 RX ADMIN — MORPHINE SULFATE 4.7 MG: 0.5 INJECTION, SOLUTION EPIDURAL; INTRATHECAL; INTRAVENOUS at 06:58

## 2022-04-11 RX ADMIN — SODIUM CITRATE AND CITRIC ACID MONOHYDRATE 30 ML: 500; 334 SOLUTION ORAL at 06:07

## 2022-04-11 RX ADMIN — KETOROLAC TROMETHAMINE 30 MG: 30 INJECTION, SOLUTION INTRAMUSCULAR; INTRAVENOUS at 19:59

## 2022-04-11 RX ADMIN — DIPHENHYDRAMINE HYDROCHLORIDE 25 MG: 25 CAPSULE ORAL at 16:53

## 2022-04-11 NOTE — NURSING NOTE
Patient is a  at 35.5 weeks with complaint of hypertension at home and heaviness in her chest. Patient states her blood pressure before coming to the hospital was 170/110 manually. Upon arrival patient blood pressure was 166/91 and 159/89. Urine dipstick showed 300 of protein. Dr. Castillo notified of patient complaint and blood pressure readings. Orders for a CBC, CMP, urine PC ration, type and screen, PT, PTT, INR, fibrinogen, and GBS swab. Dr. Castillo also ordered betamethasone 12mg IM and to follow the hypertension protocol starting with Procardia since patient HR is less than 60. POC explained to patient and all questions answered at this time.

## 2022-04-11 NOTE — PLAN OF CARE
Problem: Adult Inpatient Plan of Care  Goal: Absence of Hospital-Acquired Illness or Injury  Intervention: Identify and Manage Fall Risk  Recent Flowsheet Documentation  Taken 2022 1630 by Kierra Dawson RN  Safety Promotion/Fall Prevention: safety round/check completed  Taken 2022 1530 by Kierra Dawson RN  Safety Promotion/Fall Prevention: safety round/check completed  Taken 2022 1430 by Kierra Dawson RN  Safety Promotion/Fall Prevention: safety round/check completed  Taken 2022 1330 by Kierra Dawson RN  Safety Promotion/Fall Prevention: safety round/check completed  Taken 2022 1230 by Kierra Dawson RN  Safety Promotion/Fall Prevention: safety round/check completed  Taken 2022 1130 by Kierra Dawson RN  Safety Promotion/Fall Prevention: safety round/check completed  Taken 2022 0930 by Kierra Dawson RN  Safety Promotion/Fall Prevention: safety round/check completed  Taken 2022 0830 by Kierra Dawson RN  Safety Promotion/Fall Prevention: safety round/check completed  Taken 2022 0735 by Kierra Dawson RN  Safety Promotion/Fall Prevention: safety round/check completed  Intervention: Prevent and Manage VTE (Venous Thromboembolism) Risk  Recent Flowsheet Documentation  Taken 2022 0811 by Kierra Dawson RN  VTE Prevention/Management:   bilateral   sequential compression devices on     Problem:  Fall Injury Risk  Goal: Absence of Fall, Infant Drop and Related Injury  Intervention: Promote Injury-Free Environment  Recent Flowsheet Documentation  Taken 2022 1630 by Kierra Dawson RN  Safety Promotion/Fall Prevention: safety round/check completed  Taken 2022 1530 by Kierra Dawson RN  Safety Promotion/Fall Prevention: safety round/check completed  Taken 2022 1430 by Kierra Dawson RN  Safety Promotion/Fall Prevention: safety round/check completed  Taken 2022 1330 by Kierra Dawson RN  Safety Promotion/Fall  Prevention: safety round/check completed  Taken 2022 1230 by Kierra Dawson RN  Safety Promotion/Fall Prevention: safety round/check completed  Taken 2022 1130 by Kierra Dawson RN  Safety Promotion/Fall Prevention: safety round/check completed  Taken 2022 0930 by Kierra Dawson RN  Safety Promotion/Fall Prevention: safety round/check completed  Taken 2022 0830 by Kierra Dawson RN  Safety Promotion/Fall Prevention: safety round/check completed  Taken 2022 0735 by Kierra Dawson RN  Safety Promotion/Fall Prevention: safety round/check completed     Problem: Bleeding ( Delivery)  Goal: Bleeding is Controlled  Outcome: Met     Problem: Change in Fetal Wellbeing ( Delivery)  Goal: Stable Fetal Wellbeing  Outcome: Met     Problem: Infection ( Delivery)  Goal: Absence of Infection Signs and Symptoms  Outcome: Met     Problem: Respiratory Compromise ( Delivery)  Goal: Effective Oxygenation and Ventilation  Outcome: Met

## 2022-04-11 NOTE — PLAN OF CARE
Problem: Adult Inpatient Plan of Care  Goal: Plan of Care Review  Outcome: Ongoing, Progressing  Goal: Patient-Specific Goal (Individualized)  Outcome: Ongoing, Progressing  Goal: Absence of Hospital-Acquired Illness or Injury  Outcome: Ongoing, Progressing  Goal: Optimal Comfort and Wellbeing  Outcome: Ongoing, Progressing  Goal: Readiness for Transition of Care  Outcome: Ongoing, Progressing     Problem: Bleeding ( Delivery)  Goal: Bleeding is Controlled  Outcome: Ongoing, Progressing     Problem: Change in Fetal Wellbeing ( Delivery)  Goal: Stable Fetal Wellbeing  Outcome: Ongoing, Progressing     Problem: Infection ( Delivery)  Goal: Absence of Infection Signs and Symptoms  Outcome: Ongoing, Progressing     Problem: Respiratory Compromise ( Delivery)  Goal: Effective Oxygenation and Ventilation  Outcome: Ongoing, Progressing   Goal Outcome Evaluation:

## 2022-04-11 NOTE — ANESTHESIA POSTPROCEDURE EVALUATION
Patient: Ailyn Bedoya    Procedure Summary     Date: 04/11/22 Room / Location:     Anesthesia Start: 0624 Anesthesia Stop: 0727    Procedure: ANESTHESIA PRE-OP CONSULT Diagnosis:     Scheduled Providers:  Provider: Sam Ibarra MD    Anesthesia Type: spinal ASA Status: 3          Anesthesia Type: spinal    Vitals  Vitals Value Taken Time   /79 04/11/22 0850   Temp     Pulse 51 04/11/22 0850   Resp     SpO2 99 % 04/11/22 0847   Vitals shown include unvalidated device data.        Post Anesthesia Care and Evaluation    Patient location during evaluation: bedside  Patient participation: complete - patient participated  Level of consciousness: awake and alert  Pain management: adequate  Airway patency: patent  Anesthetic complications: No anesthetic complications  PONV Status: none  Cardiovascular status: acceptable  Respiratory status: acceptable  Hydration status: acceptable  Post Neuraxial Block status: No signs or symptoms of PDPH  Comments: An Anesthesiologist personally participated in the most demanding procedures (including induction and emergence if applicable) in the anesthesia plan, monitored the course of anesthesia administration at frequent intervals and remained physically present and available for immediate diagnosis and treatment of emergencies.

## 2022-04-11 NOTE — ANESTHESIA PROCEDURE NOTES
Spinal Block    Pre-sedation assessment completed: 4/11/2022 6:36 AM    Patient reassessed immediately prior to procedure    Patient location during procedure: OB  Start Time: 4/11/2022 6:36 AM  Stop Time: 4/11/2022 6:36 AM  Indication:at surgeon's request and post-op pain management  Performed By  Anesthesiologist: Sam Ibarra MD  Preanesthetic Checklist  Completed: patient identified, IV checked, risks and benefits discussed, surgical consent, monitors and equipment checked, pre-op evaluation and timeout performed  Spinal Block Prep:  Patient Position:sitting  Sterile Tech:cap, gloves, mask and sterile barriers  Prep:Chloraprep  Patient Monitoring:blood pressure monitoring, continuous pulse oximetry and EKG  Spinal Block Procedure  Approach:midline  Guidance:landmark technique and palpation technique  Location:L3-L4  Needle Type:Ajay  Needle Gauge:27 G  Placement of Spinal needle event:cerebrospinal fluid aspirated  Paresthesia: no  Fluid Appearance:clear  Medications: morphine PF (DURAMORPH) injection, 0.3 mg  bupivacaine PF (MARCAINE) injection 0.75%, 1.6 mL   Post Assessment  Patient Tolerance:patient tolerated the procedure well with no apparent complications  Complications no  Additional Notes  Skin infiltration with Lidocaine 1%. Introducer inserted, followed by spinal needle. + CSF return. Intrathecal marcaine and PF duramorph/fentanyl injected (see eMAR). Spinal needle/introducer removed. Site clean/dry/intact.

## 2022-04-11 NOTE — DISCHARGE SUMMARY
OB Discharge Summary        Admit Date:  2022  Date of Delivery: 2022   Discharge Date: 22      Reason for Admission: Preeclampsia with severe features    Final Diagnosis:  35+5 RCS  Preeclampsia with severe features s/p Mag  Prior   Footling breech presentation  To do at FU: Blood pressure follow-up, varicella vaccine, routine postpartum care    Antepartum:  Prenatal care is complicated by:  Prior , Pre-eclampsia    Intrapartum/Delivery:  OB Surgeon:  Dr. Beata Castillo,   Anesthesia: Spinal  Delivery Type:   Perineum: NA  Feeding method: Breast    Infant: female  infant; Ryleigh Ann    Weight: 2200 g (4 lb 13.6 oz)      APGARS: 8  @ 1 minute / 9  @ 5 minutes    Hospital Course/Significant Findings:  Patient arrived after checking blood pressure at home due to some chest discomfort which.  Blood pressure at home 170/110.  On arrival blood pressures in severe range with systolics in the 160s.  Urine PC ratio 14.  Preeclampsia with severe features, prior  and breech.  Repeat  performed uncomplicated.  Patient received 1 dose of 5 mg of hydralazine prior to delivery with blood pressures in mild range.  Magnesium postpartum.  PP uncomplicated, did not need any BP meds.    Results from last 7 days   Lab Units 22  0522  05   WBC 10*3/mm3 11.10* 19.27* 11.76*   HEMOGLOBIN g/dL 10.3* 11.1* 13.7   HEMATOCRIT % 31.4* 34.7 41.8   PLATELETS 10*3/mm3 191 218 218       Results from last 7 days   Lab Units 22  0522 22  0529 22  1127   GLUCOSE mg/dL 85 127* 114*   CREATININE mg/dL 0.93 0.90 0.81   SODIUM mmol/L 131* 127* 132*   POTASSIUM mmol/L 4.3 4.5 4.7   CHLORIDE mmol/L 101 97* 102   AST (SGOT) U/L 20 23 26   ALT (SGPT) U/L 14 13 16       Lab Results   Component Value Date    CREATININEUR 25.5 2022    PROTEINUR 363.7 2022    UTPCR 14.26 2022        Discharge:         Discharge Medications      New  Medications      Instructions Start Date   acetaminophen 325 MG tablet  Commonly known as: Tylenol   650 mg, Oral, Every 6 Hours PRN      docusate sodium 100 MG capsule  Commonly known as: Colace   100 mg, Oral, 2 Times Daily PRN      ferrous sulfate 325 (65 FE) MG tablet   325 mg, Oral, Every Other Day      ibuprofen 600 MG tablet  Commonly known as: ADVIL,MOTRIN   600 mg, Oral, Every 6 Hours PRN         Continue These Medications      Instructions Start Date   PRENATAL 1 PO   Oral               Disposition: Home  Diet: Regular    Pelvic Rest: 6 weeks    Condition at discharge: Good    Follow up with: Beata Castillo DO or provider of her choice    Follow up in: 1 week BP check      Complications: None      Signature: Electronically signed by Beata Castillo DO, 04/13/22, 12:55 PM EDT.        [facility]  [address] [place] [address] [place]  [place] [address]  Dept: [phone]  Loc: [phone]

## 2022-04-11 NOTE — OP NOTE
OB Operative Note        Date of Service:  22     Pre-Operative Dx:   IUP at Gestational Age: 35w5d  weeks    indication: Preeclampsia with severe features  Prior , currently breech    Postoperative dx:   RAJI    Procedure: Repeat LTCS    Surgeon/Assistant: Dr. Beata Castillo,     Anesthesia:  Spinal    Anesthesiologist: Dr. Ngo    EBL: 500 Mls    Findings: Normal uterus, Normal tubes, Normal ovaries, Normal placenta, centrally inserting cord, Nuchal cord x1.  Footling breech presentation.  On last swipe of scalpel for hysterotomy incision fetus kicked foot out into incision.  Small scratch on left ankle.  Currently hemostatic with just small bandage in NICU.    Infant: Gender:  female  infant    Weight:   2200 g (4 lb 13.6 oz)     APGARS:  8  @ 1 minute / 9  @ 5 minutes    Specimens:  Cord blood, cord gases     Procedure Details:  The patient was brought to the operating room and spinal anesthesia was deemed to be adequate.  She was prepped and draped in a normal sterile fashion and placed in supine position with a leftward tilt.  A Pfannenstiel skin incision was made approximately 2 fingerbreadths above the symphysis pubis and carried down to the underlying layer of fascia.  Her old skin incision was excised and discarded.  The fascia was nicked in the midline and extended laterally with Woods scissors.  The superior and inferior aspects of the fascial incision were grasped with Kocher clamps and the underlying rectus muscle was dissected off bluntly.  The midline was identified and opened in a sharp fashion with no noted damage to underlying bowel, bladder, blood vessels, or organs. The vesicouterine peritoneum was incised and the bladder flap was created.  The lower uterine segment was incised in a transverse fashion and extended laterally in a manual fashion.  Fluid was noted to be clear.  The fetal feet were brought up atraumatically through the uterine incision.  The bilateral legs  were delivered.  A moist blue towel was used to assist the delivery of the infant to the level of the scapula.  The bilateral arms were delivered.  The head was delivered flexed using the Bbysmpobt-Kbsbwjt-Irlq maneuver. The mouth and nares were bulb suctioned.  The infant was vigorous.  The cord was clamped and cut after a delay of 60 seconds and the infant was handed off to [facility] baby care.  A segment of cord was handed off to the technician for collection of cord blood and cord gases.  The placenta delivered with the assistance of fundal massage and was discarded.  The uterus was exteriorized and cleared of all clots and debris.  The uterine incision was repaired with 0 Vicryl in a running fashion.  A second imbricating stitch was placed. Excellent hemostasis was assured.     The uterus was placed back into the pelvic cavity.  Copious irrigation was performed.  The gutters were cleared of all clot and debris.  The uterine incision was reinspected off tension and noted to be hemostatic.  Interceed was placed over the uterine incision and anterior uterus.  The parietal peritoneum was closed.  The rectus muscles were reapproximated in the midline.  The rectus muscles and fascia were noted to be hemostatic.  The fascia was closed with 0 Vicryl in a running fashion starting at the bilateral angles and crossing the midline.  The subcutaneous tissue was copiously irrigated and made hemostatic.  It was reapproximated using Monocryl. Tthe skin was 4-0 Monocryl in a subcuticular fashion via first assist.  Urine was clear at the end of the procedure.     The patient tolerated the procedure well.  Instrument, lap, and needle counts were correct.  The patient received antibiotics prior to the procedure.  She was taken to the recovery room in stable condition.     Complications: None    Condition: Good    Disposition:  PACU          Electronically signed by:  Beata Castillo DO, 04/11/22, 7:30 AM EDT.

## 2022-04-11 NOTE — DISCHARGE INSTRUCTIONS
DR. CLIFTON'S POSTOP  DISCHARGE PRECAUTIONS and Answers to FAQs     NO SEX, tampons, or douching for SIX weeks.     NO DRIVING for TWO weeks. or while taking narcotic pain medications.     NO TUB BATH or POOL for FOUR weeks, shower only.       NO LIFTING more than 20 pounds for 2 week(s).     STAPLES (if present):  follow up at the office in the next 3-7 days to have them removed if they were not removed before discharge.  If your staples were removed already and steri-strips placed (or you just had steri-strips) REMOVE YOUR STERI STRIPS in TEN DAYS.      INCISION CARE:   WASH DAILY in the shower with soap and water (any type of soap is fine, it does not need to be antibacterial soap).  Look (or have a family member/friend look) at your incision EVERY DAY when you get home.  Keeping the incision DRY is extremely important.  Continue to keep a clean, dry wash cloth (to help wick away moisture) on your incision for 10 days.  Change out the wash cloth frequently (approximately every 2-8 hrs as needed to prevent it from getting moist).  REDNESS, PUS, increase in PAIN, FEVER or CHILLS are all reasons to be seen in our office immediately.  Go to the ER, if it is after hours or a weekend.         VAGINAL BLEEDING:  may continue on and off over the next several weeks after delivery and may increase slightly once you go home.  You should not be bleeding more than 1 large pad soaked every hour or two.  Clots (even the size of a lemon or larger) may be normal as long as the bleeding is not heavy and the clots do not continue.       FEVER or CHILLS or NOT FEELING WELL: call our office.  If the office is closed, you need to be seen in acute care or ER.       CHEST PAIN or SHORTNESS OF BREATH/AIR: you need to GO TO THE NEAREST ER or CALL 911.      SWELLING:  can increase over the next 7-10 days and then should slowly improve.  Your legs/ankles should be fairly similar in size.  A red, painful, hot, swollen leg (usually  just one side) can be a sign of a blood clot and should be evaluated immediately.  Call our office.  If it is after hours or a weekend, you must be seen IMMEDIATELY IN THE ER.      ELEVATED BLOOD PRESSURE:  you need to contact us if you are having  persistent elevated BP systolic (top number) more than 155 or diastolic (bottom number) more than 95, or a headache (not relieved with rest, hydration or over the counter pain reliever), an increase in your swelling (usually hands and face), changes in your vision (typically flashing white or black spots) or severe persistent pain in the location of the upper right side of your belly (under your right breast).  Call our office or go to ER if after hours or a weekend.     LACTATION QUESTIONS or CONCERNS?  Call [facility] Lactation support [phone].     WORK and SCHOOL TIME OFF: depends on your specific delivery type, surrounding circumstances, and your work insurance/school rules.  If you have questions, please call Juliana or Diana at [phone] (ext. [phone]).  Or email Juliana at [email or web address].  They will assist in required paperwork for you and/or family members.      For further QUESTIONS or CONCERNS, please call [facility] WILBERT Bose at [phone].

## 2022-04-11 NOTE — ANESTHESIA PREPROCEDURE EVALUATION
Anesthesia Evaluation     Patient summary reviewed and Nursing notes reviewed                Airway   Mallampati: I  TM distance: >3 FB  Neck ROM: full  No difficulty expected  Dental      Pulmonary - normal exam    breath sounds clear to auscultation  (+) asthma,  Cardiovascular - negative cardio ROS and normal exam    Rhythm: regular  Rate: normal        Neuro/Psych- negative ROS  GI/Hepatic/Renal/Endo - negative ROS     Musculoskeletal (-) negative ROS    Abdominal    Substance History - negative use     OB/GYN    (+) Pregnant, pregnancy induced hypertension        Other                        Anesthesia Plan    ASA 3     spinal       Anesthetic plan, all risks, benefits, and alternatives have been provided, discussed and informed consent has been obtained with: patient.    Plan discussed with CRNA.        CODE STATUS:    Level Of Support Discussed With: Patient  Code Status (Patient has no pulse and is not breathing): CPR (Attempt to Resuscitate)  Medical Interventions (Patient has pulse or is breathing): Full Support

## 2022-04-11 NOTE — H&P
OB HISTORY AND PHYSICAL      SUBJECTIVE:    31 y.o. female  currently at 35w5d [facility] complicated by:  Hx PTD emergent CS for NRFHR in triage (small abruption noted)    Patient Active Problem List    Diagnosis    • Pregnancy [Z34.90]    • Maternal varicella, non-immune [O09.899, Z28.39]    • Hx of  delivery, currently pregnant [O09.899]    • History of placenta abruption [Z87.59]    • H/O:  [Z98.891]    • Breech presentation [O32.1XX0]    • Supervision of other normal pregnancy, antepartum [Z34.80]    • IBS (irritable bowel syndrome) [K58.9]        CC/HPI:  Presents with Elevated BP.  Patient has had URI symptoms for 2 weeks.  Since yesterday he has been noting mild headache and took blood pressure at home which was 170/110.  She has had a mild headache on and off, has not tried Tylenol.  On arrival in triage blood pressures systolic in mild severe range.  Denies vision change.  Does note upper extremity and facial edema over the past day.  No right upper quadrant pain.    She was tested for flu and Covid less than a week ago both were negative.  She has had cough and congestion and runny nose.  She has noted that her chest has had some pressure when lying down.  That is why she took her blood pressure.  When she is sitting up she has no chest pressure.  She denies chest pain or shortness of air.  Pulse ox in triage 100% room air.  Heart rate 50 to 60s    ROS: No leaking fluid, No vaginal bleeding, No contractions, Adequate FM, No fever/chills, No nausea, No vomiting, No diarrhea, No constipation, No abdominal pain, No back pain and No UTI symptoms    Past OB History:   OB History    Para Term  AB Living   3 1 0 1 1 1   SAB IAB Ectopic Molar Multiple Live Births   1 0 0 0 0 1      # Outcome Date GA Lbr Siva/2nd Weight Sex Delivery Anes PTL Lv   3 Current            2  / 33w5d  1984 g (4 lb 6 oz) M CS-Unspec   BRADY      Complications: NOLVIA (amniotic fluid index)  borderline low   1 SAB      SAB           Prenatal Labs:    External Prenatal Results     Pregnancy Outside Results - Transcribed From Office Records - See Scanned Records For Details     Test Value Date Time    ABO  A  21 1620    Rh  Positive  21 1620    Antibody Screen  Negative  21 1620    Varicella IgG  <135 index 21 1620    Rubella  2.03 index 21 1620    Hgb  13.7 g/dL 22 0522       12.2 g/dL 22 1633       12.6 g/dL 21 1620       13.5 g/dL 21 1415    Hct  41.8 % 22 0522       36.0 % 22 1633       38.6 % 21 1620       42.9 % 21 1415    Glucose Fasting GTT       Glucose Tolerance Test 1 hour       Glucose Tolerance Test 3 hour       Gonorrhea (discrete)  Negative  21 1553    Chlamydia (discrete)  Negative  21 1553    RPR  Non-Reactive  21 1620    VDRL       Syphilis Antibody       HBsAg  Non-Reactive  21 1620    Herpes Simplex Virus PCR       Herpes Simplex VIrus Culture       HIV  Non-Reactive  21 1620    Hep C RNA Quant PCR       Hep C Antibody  Non-Reactive  21 1620    AFP       Group B Strep       GBS Susceptibility to Clindamycin       GBS Susceptibility to Erythromycin       Fetal Fibronectin       Genetic Testing, Maternal Blood              PMHx:    Past Medical History:   Diagnosis Date   • IBS (irritable bowel syndrome)    • Mild intermittent asthma - dx as a child, no meds    • Ovarian cyst    • Scoliosis        Home Medications:  Loratadine, Prenatal MV-Min-Fe Fum-FA-DHA, doxylamine, and vitamin B-6    Allergies:  No Known Allergies    PSHx:    Past Surgical History:   Procedure Laterality Date   •  SECTION      LTCS 2019 Preen 33+5 NRFHR, emergent   • DILATATION AND CURETTAGE      2018 Preen MAB, 6weeks by US, 9weeks by LMP       Social History:    reports that she has never smoked. She has never used smokeless tobacco. She reports that she does not drink alcohol and does not use  drugs.    Family History: Non contributory    Immunizations: See prenatal record for Tdap, Flu, Covid and/or other vaccinations    PHYSICAL EXAM:  Temp:  [98.1 °F (36.7 °C)] 98.1 °F (36.7 °C)  Heart Rate:  [51-57] 56  Resp:  [18] 18  BP: (154-172)/(83-91) 154/84  General- NAD, alert and oriented, appropriate  Psych- normal mood, good memory  CV- Regular rhythm, no murnurs  Resp- CTA to bases, no wheezes  Abdomen- Gravid, non tender  Fundus-  Non tender.  Size: consistent with dates  EFW- 5 lbs, 5 1/2 lbs  Presentation- Breech  Suspected  Ext/DTR: +1 edema, bilaterally equal, no signs of DVT, DTR patella +2, DTR achilles NO clonus    Fetal HR: Category I  Contractions: Irritability    Lab/Imaging/Other:   Results from last 7 days   Lab Units 22  0522   WBC 10*3/mm3 11.76*   HEMOGLOBIN g/dL 13.7   HEMATOCRIT % 41.8   PLATELETS 10*3/mm3 218       Lab Results   Component Value Date    CREATININEUR 25.5 2022    PROTEINUR 363.7 2022    UTPCR 14.26 2022        ASSESSMENT:  35w5d  Pre-eclampsia w severe features  GBS: Unknown  Prior csection, breech  Suspect viral URI, pt has been vaccinated and boosted for Covid, will recheck covid swab    PLAN:  Admit  Delivery:   Pt arrived BP systolic >160.  BTMZ, labs, IV ordered.  Initial FU Bps, HTN protocol for Tx of HTN.  FU BP systolic >160.  Pt consented for RCS for pre-e w severe features, pt agrees.  Anesthesia present.  Will plan mag.  Tx w hydralazine HTN protocol.    Plan of care NLT hospital course, R/B/A/potential SE, suspected length have been reviewed with patient and any family or friends present, questions answered to her/his/their satisfaction.  Pt desires to proceed as above.    Counseling:The patient was counseled on the risks, benefits and alternatives of a .  Risks reviewed, but are not limited to: anesthesia, bleeding, transfusion, infection, damage to organs, reoperation, wound separation, blood clots, and death.  She  declines the alternatives and desires a .  All her questions have been answered to her satisfaction and she desires to proceed.          Electronically signed by Beata Castillo DO, 22, 6:08 AM EDT.    [facility]  [address] [place] [address]  [facility] [place] [address]  Dept: [phone]  Loc: [phone]

## 2022-04-12 LAB
ALBUMIN SERPL-MCNC: 2.9 G/DL (ref 3.5–5.2)
ALBUMIN/GLOB SERPL: 1.3 G/DL
ALP SERPL-CCNC: 119 U/L (ref 39–117)
ALT SERPL W P-5'-P-CCNC: 13 U/L (ref 1–33)
ANION GAP SERPL CALCULATED.3IONS-SCNC: 9.9 MMOL/L (ref 5–15)
AST SERPL-CCNC: 23 U/L (ref 1–32)
BILIRUB SERPL-MCNC: <0.2 MG/DL (ref 0–1.2)
BUN SERPL-MCNC: 21 MG/DL (ref 6–20)
BUN/CREAT SERPL: 23.3 (ref 7–25)
CALCIUM SPEC-SCNC: 6.9 MG/DL (ref 8.6–10.5)
CHLORIDE SERPL-SCNC: 97 MMOL/L (ref 98–107)
CO2 SERPL-SCNC: 20.1 MMOL/L (ref 22–29)
CREAT SERPL-MCNC: 0.9 MG/DL (ref 0.57–1)
DEPRECATED RDW RBC AUTO: 38.6 FL (ref 37–54)
EGFRCR SERPLBLD CKD-EPI 2021: 88.9 ML/MIN/1.73
ERYTHROCYTE [DISTWIDTH] IN BLOOD BY AUTOMATED COUNT: 12.7 % (ref 12.3–15.4)
GLOBULIN UR ELPH-MCNC: 2.3 GM/DL
GLUCOSE SERPL-MCNC: 127 MG/DL (ref 65–99)
HCT VFR BLD AUTO: 34.7 % (ref 34–46.6)
HGB BLD-MCNC: 11.1 G/DL (ref 12–15.9)
MCH RBC QN AUTO: 27.1 PG (ref 26.6–33)
MCHC RBC AUTO-ENTMCNC: 32 G/DL (ref 31.5–35.7)
MCV RBC AUTO: 84.8 FL (ref 79–97)
PLATELET # BLD AUTO: 218 10*3/MM3 (ref 140–450)
PMV BLD AUTO: 10.1 FL (ref 6–12)
POTASSIUM SERPL-SCNC: 4.5 MMOL/L (ref 3.5–5.2)
PROT SERPL-MCNC: 5.2 G/DL (ref 6–8.5)
RBC # BLD AUTO: 4.09 10*6/MM3 (ref 3.77–5.28)
SODIUM SERPL-SCNC: 127 MMOL/L (ref 136–145)
WBC NRBC COR # BLD: 19.27 10*3/MM3 (ref 3.4–10.8)

## 2022-04-12 PROCEDURE — 85027 COMPLETE CBC AUTOMATED: CPT | Performed by: OBSTETRICS & GYNECOLOGY

## 2022-04-12 PROCEDURE — 0503F POSTPARTUM CARE VISIT: CPT | Performed by: OBSTETRICS & GYNECOLOGY

## 2022-04-12 PROCEDURE — 80053 COMPREHEN METABOLIC PANEL: CPT | Performed by: OBSTETRICS & GYNECOLOGY

## 2022-04-12 PROCEDURE — 0 MAGNESIUM SULFATE 20 GM/500ML SOLUTION: Performed by: OBSTETRICS & GYNECOLOGY

## 2022-04-12 PROCEDURE — 25010000002 KETOROLAC TROMETHAMINE PER 15 MG: Performed by: ANESTHESIOLOGY

## 2022-04-12 RX ORDER — NIFEDIPINE 10 MG/1
10 CAPSULE ORAL EVERY 4 HOURS PRN
Status: DISCONTINUED | OUTPATIENT
Start: 2022-04-12 | End: 2022-04-13 | Stop reason: HOSPADM

## 2022-04-12 RX ORDER — IBUPROFEN 600 MG/1
600 TABLET ORAL EVERY 6 HOURS SCHEDULED
Status: DISCONTINUED | OUTPATIENT
Start: 2022-04-12 | End: 2022-04-13 | Stop reason: HOSPADM

## 2022-04-12 RX ADMIN — ACETAMINOPHEN 650 MG: 325 TABLET ORAL at 08:08

## 2022-04-12 RX ADMIN — DOCUSATE SODIUM 100 MG: 100 CAPSULE, LIQUID FILLED ORAL at 08:08

## 2022-04-12 RX ADMIN — KETOROLAC TROMETHAMINE 30 MG: 30 INJECTION, SOLUTION INTRAMUSCULAR; INTRAVENOUS at 08:08

## 2022-04-12 RX ADMIN — ACETAMINOPHEN 650 MG: 325 TABLET ORAL at 21:47

## 2022-04-12 RX ADMIN — KETOROLAC TROMETHAMINE 30 MG: 30 INJECTION, SOLUTION INTRAMUSCULAR; INTRAVENOUS at 02:21

## 2022-04-12 RX ADMIN — ACETAMINOPHEN 650 MG: 325 TABLET ORAL at 02:21

## 2022-04-12 RX ADMIN — MAGNESIUM SULFATE IN WATER 2 G/HR: 40 INJECTION, SOLUTION INTRAVENOUS at 03:49

## 2022-04-12 RX ADMIN — ACETAMINOPHEN 650 MG: 325 TABLET ORAL at 13:41

## 2022-04-12 RX ADMIN — IBUPROFEN 600 MG: 600 TABLET ORAL at 23:36

## 2022-04-12 RX ADMIN — IBUPROFEN 600 MG: 600 TABLET ORAL at 13:41

## 2022-04-12 NOTE — PROGRESS NOTES
PostPartum/PostOp PROGRESS NOTE        Subjective:  • Patient has no complaints  • Pain controlled  • Tolerating a regular diet  • Passing flatus  • Not OOB yet  • Huynh cath in place  • Lochia decreasing, no bleeding concerns  • Denies HA, vision change, or RUQ/epigastric pain    Objective:   Vitals: reviewed  General- NAD, alert and oriented, appropriate  Psych- Normal mood, good memory  CV/Resp- CV- Regular rhythm, no murnurs and Resp- CTA to bases, no wheezes  Abdomen- Fundus firm, non tender, Soft, non distended, non tender, normal active bowel sounds, Bandage intact and Fundus at U  Ext/DTRs- +1 edema, bilaterally equal, no signs of DVT, SCDs on and working, DTR achilles NO clonus, Patella DTR 0    Results from last 7 days   Lab Units 04/12/22  0529 04/11/22  0522   WBC 10*3/mm3 19.27* 11.76*   HEMOGLOBIN g/dL 11.1* 13.7   HEMATOCRIT % 34.7 41.8   PLATELETS 10*3/mm3 218 218       Results from last 7 days   Lab Units 04/12/22  0529 04/11/22  1127   GLUCOSE mg/dL 127* 114*   CREATININE mg/dL 0.90 0.81   SODIUM mmol/L 127* 132*   POTASSIUM mmol/L 4.5 4.7   CHLORIDE mmol/L 97* 102   AST (SGOT) U/L 23 26   ALT (SGPT) U/L 13 16       Assessment:    Post-partum/postop Day:  1  Pre-e on Mag  Plan:   • Routine postpartum/postop care  • Remove huynh  • Saline lock IV  • Remove bandage  • Shower  • PO pain meds  • Importance of wound care/keep clean and dry  • DC mag transfer to               Electronically signed by Beata Castillo DO, 04/12/22, 9:07 AM EDT.

## 2022-04-12 NOTE — LACTATION NOTE
OLIVIA in to see this patient and her lactation progress. She states she is usually pumping every 3 hours but did sleep at the 0300 pumping session. She is getting enough to collect and they are taking it to the NICU. She states pumping has not been painful.   nursing home papers/EMS

## 2022-04-12 NOTE — PLAN OF CARE
Problem: Adult Inpatient Plan of Care  Goal: Plan of Care Review  Outcome: Ongoing, Progressing  Flowsheets (Taken 2022 1744)  Progress: improving  Plan of Care Reviewed With: patient  Goal: Patient-Specific Goal (Individualized)  Outcome: Ongoing, Progressing  Goal: Absence of Hospital-Acquired Illness or Injury  Outcome: Ongoing, Progressing  Intervention: Identify and Manage Fall Risk  Recent Flowsheet Documentation  Taken 2022 1035 by Lorna Mac RN  Safety Promotion/Fall Prevention:   safety round/check completed   clutter free environment maintained  Taken 2022 0800 by Lorna Mac RN  Safety Promotion/Fall Prevention: safety round/check completed  Intervention: Prevent and Manage VTE (Venous Thromboembolism) Risk  Recent Flowsheet Documentation  Taken 2022 1035 by Lorna Mac RN  Activity Management: ambulated to bathroom  Taken 2022 0800 by Lorna Mac RN  VTE Prevention/Management: sequential compression devices on  Goal: Optimal Comfort and Wellbeing  Outcome: Ongoing, Progressing  Intervention: Provide Person-Centered Care  Recent Flowsheet Documentation  Taken 2022 0800 by Lorna Mac RN  Trust Relationship/Rapport:   care explained   choices provided   emotional support provided   empathic listening provided   questions answered   questions encouraged   reassurance provided   thoughts/feelings acknowledged  Goal: Readiness for Transition of Care  Outcome: Ongoing, Progressing     Problem:  Fall Injury Risk  Goal: Absence of Fall, Infant Drop and Related Injury  Outcome: Ongoing, Progressing  Intervention: Identify and Manage Contributors  Recent Flowsheet Documentation  Taken 2022 1035 by Lorna Mac RN  Medication Review/Management: medications reviewed  Taken 2022 0800 by Lorna Mac RN  Medication Review/Management:   medications reviewed   high-risk medications identified  Intervention: Promote Injury-Free  Environment  Recent Flowsheet Documentation  Taken 2022 1035 by Lorna Mac, RN  Safety Promotion/Fall Prevention:   safety round/check completed   clutter free environment maintained  Taken 2022 0800 by Lorna Mac, RN  Safety Promotion/Fall Prevention: safety round/check completed     Problem: Adjustment to Role Transition (Postpartum  Delivery)  Goal: Successful Maternal Role Transition  Outcome: Ongoing, Progressing     Problem: Bleeding (Postpartum  Delivery)  Goal: Hemostasis  Outcome: Ongoing, Progressing     Problem: Infection (Postpartum  Delivery)  Goal: Absence of Infection Signs and Symptoms  Outcome: Ongoing, Progressing     Problem: Pain (Postpartum  Delivery)  Goal: Acceptable Pain Control  Outcome: Ongoing, Progressing     Problem: Postoperative Nausea and Vomiting (Postpartum  Delivery)  Goal: Nausea and Vomiting Relief  Outcome: Ongoing, Progressing     Problem: Postoperative Urinary Retention (Postpartum  Delivery)  Goal: Effective Urinary Elimination  Outcome: Ongoing, Progressing     Problem: Breastfeeding  Goal: Effective Breastfeeding  Outcome: Ongoing, Progressing   Goal Outcome Evaluation:  Plan of Care Reviewed With: patient        Progress: improving

## 2022-04-12 NOTE — NURSING NOTE
Pt up at this time for the first time post mag. Pt was able to walk to restroom with assistance, pt was able to void small amount, scant amount of bleeding noted, pt cleaned up, gown changed and pt going to see infant in NICU via wheelchair with .

## 2022-04-13 VITALS
DIASTOLIC BLOOD PRESSURE: 84 MMHG | BODY MASS INDEX: 28.14 KG/M2 | SYSTOLIC BLOOD PRESSURE: 135 MMHG | RESPIRATION RATE: 18 BRPM | HEART RATE: 81 BPM | WEIGHT: 190 LBS | HEIGHT: 69 IN | OXYGEN SATURATION: 100 % | TEMPERATURE: 98.24 F

## 2022-04-13 LAB
ALBUMIN SERPL-MCNC: 2.7 G/DL (ref 3.5–5.2)
ALBUMIN/GLOB SERPL: 1.4 G/DL
ALP SERPL-CCNC: 93 U/L (ref 39–117)
ALT SERPL W P-5'-P-CCNC: 14 U/L (ref 1–33)
ANION GAP SERPL CALCULATED.3IONS-SCNC: 8.6 MMOL/L (ref 5–15)
AST SERPL-CCNC: 20 U/L (ref 1–32)
BACTERIA SPEC AEROBE CULT: NORMAL
BASOPHILS # BLD AUTO: 0.03 10*3/MM3 (ref 0–0.2)
BASOPHILS NFR BLD AUTO: 0.3 % (ref 0–1.5)
BILIRUB SERPL-MCNC: <0.2 MG/DL (ref 0–1.2)
BUN SERPL-MCNC: 22 MG/DL (ref 6–20)
BUN/CREAT SERPL: 23.7 (ref 7–25)
CALCIUM SPEC-SCNC: 6.4 MG/DL (ref 8.6–10.5)
CHLORIDE SERPL-SCNC: 101 MMOL/L (ref 98–107)
CO2 SERPL-SCNC: 21.4 MMOL/L (ref 22–29)
CREAT SERPL-MCNC: 0.93 MG/DL (ref 0.57–1)
DEPRECATED RDW RBC AUTO: 39.8 FL (ref 37–54)
EGFRCR SERPLBLD CKD-EPI 2021: 85.5 ML/MIN/1.73
EOSINOPHIL # BLD AUTO: 0.06 10*3/MM3 (ref 0–0.4)
EOSINOPHIL NFR BLD AUTO: 0.5 % (ref 0.3–6.2)
ERYTHROCYTE [DISTWIDTH] IN BLOOD BY AUTOMATED COUNT: 13.1 % (ref 12.3–15.4)
GLOBULIN UR ELPH-MCNC: 2 GM/DL
GLUCOSE SERPL-MCNC: 85 MG/DL (ref 65–99)
HCT VFR BLD AUTO: 31.4 % (ref 34–46.6)
HGB BLD-MCNC: 10.3 G/DL (ref 12–15.9)
IMM GRANULOCYTES # BLD AUTO: 0.08 10*3/MM3 (ref 0–0.05)
IMM GRANULOCYTES NFR BLD AUTO: 0.7 % (ref 0–0.5)
LYMPHOCYTES # BLD AUTO: 2.84 10*3/MM3 (ref 0.7–3.1)
LYMPHOCYTES NFR BLD AUTO: 25.6 % (ref 19.6–45.3)
MCH RBC QN AUTO: 28 PG (ref 26.6–33)
MCHC RBC AUTO-ENTMCNC: 32.8 G/DL (ref 31.5–35.7)
MCV RBC AUTO: 85.3 FL (ref 79–97)
MONOCYTES # BLD AUTO: 0.63 10*3/MM3 (ref 0.1–0.9)
MONOCYTES NFR BLD AUTO: 5.7 % (ref 5–12)
NEUTROPHILS NFR BLD AUTO: 67.2 % (ref 42.7–76)
NEUTROPHILS NFR BLD AUTO: 7.46 10*3/MM3 (ref 1.7–7)
NRBC BLD AUTO-RTO: 0 /100 WBC (ref 0–0.2)
PLATELET # BLD AUTO: 191 10*3/MM3 (ref 140–450)
PMV BLD AUTO: 10 FL (ref 6–12)
POTASSIUM SERPL-SCNC: 4.3 MMOL/L (ref 3.5–5.2)
PROT SERPL-MCNC: 4.7 G/DL (ref 6–8.5)
RBC # BLD AUTO: 3.68 10*6/MM3 (ref 3.77–5.28)
SODIUM SERPL-SCNC: 131 MMOL/L (ref 136–145)
WBC NRBC COR # BLD: 11.1 10*3/MM3 (ref 3.4–10.8)

## 2022-04-13 PROCEDURE — 0503F POSTPARTUM CARE VISIT: CPT | Performed by: OBSTETRICS & GYNECOLOGY

## 2022-04-13 PROCEDURE — 80053 COMPREHEN METABOLIC PANEL: CPT | Performed by: OBSTETRICS & GYNECOLOGY

## 2022-04-13 PROCEDURE — 85025 COMPLETE CBC W/AUTO DIFF WBC: CPT | Performed by: OBSTETRICS & GYNECOLOGY

## 2022-04-13 RX ORDER — ACETAMINOPHEN 325 MG/1
650 TABLET ORAL EVERY 6 HOURS PRN
Qty: 30 TABLET | Refills: 1 | Status: SHIPPED | OUTPATIENT
Start: 2022-04-13

## 2022-04-13 RX ORDER — DOCUSATE SODIUM 100 MG/1
100 CAPSULE, LIQUID FILLED ORAL 2 TIMES DAILY PRN
Qty: 60 CAPSULE | Refills: 1 | Status: SHIPPED | OUTPATIENT
Start: 2022-04-13 | End: 2022-07-13

## 2022-04-13 RX ORDER — FERROUS SULFATE 325(65) MG
325 TABLET ORAL EVERY OTHER DAY
Qty: 15 TABLET | Refills: 1 | Status: SHIPPED | OUTPATIENT
Start: 2022-04-13 | End: 2022-07-13

## 2022-04-13 RX ORDER — IBUPROFEN 600 MG/1
600 TABLET ORAL EVERY 6 HOURS PRN
Qty: 30 TABLET | Refills: 1 | Status: SHIPPED | OUTPATIENT
Start: 2022-04-13

## 2022-04-13 RX ADMIN — ACETAMINOPHEN 650 MG: 325 TABLET ORAL at 08:10

## 2022-04-13 RX ADMIN — ACETAMINOPHEN 650 MG: 325 TABLET ORAL at 13:11

## 2022-04-13 RX ADMIN — IBUPROFEN 600 MG: 600 TABLET ORAL at 05:47

## 2022-04-13 RX ADMIN — ACETAMINOPHEN 650 MG: 325 TABLET ORAL at 02:19

## 2022-04-13 RX ADMIN — DOCUSATE SODIUM 100 MG: 100 CAPSULE, LIQUID FILLED ORAL at 08:10

## 2022-04-13 RX ADMIN — IBUPROFEN 600 MG: 600 TABLET ORAL at 11:15

## 2022-04-13 NOTE — PLAN OF CARE
Goal Outcome Evaluation:           Progress: improving   Patient able to be discharged home care plan met

## 2022-04-13 NOTE — PROGRESS NOTES
PostPartum/PostOp PROGRESS NOTE        Subjective:  • Patient has no complaints  • Pain controlled  • Tolerating a regular diet  • Passing flatus  • Not OOB yet  • Urinating spontaneously  • Lochia decreasing, no bleeding concerns  • Denies HA, vision change, or RUQ/epigastric pain  • No lightheadedness or dizziness    Objective:   Vitals: reviewed  General- NAD, alert and oriented, appropriate  Psych- Normal mood, good memory  CV/Resp- CV- Regular rhythm, no murnurs and Resp- CTA to bases, no wheezes  Abdomen- Fundus firm, non tender, Soft, non distended, non tender, normal active bowel sounds, Incision clean, dry, intact and Fundus at U-2  Ext/DTRs- +1 edema, bilaterally equal, no signs of DVT    Results from last 7 days   Lab Units 04/13/22  0522 04/12/22  0529 04/11/22  0522   WBC 10*3/mm3 11.10* 19.27* 11.76*   HEMOGLOBIN g/dL 10.3* 11.1* 13.7   HEMATOCRIT % 31.4* 34.7 41.8   PLATELETS 10*3/mm3 191 218 218       Results from last 7 days   Lab Units 04/13/22  0522 04/12/22  0529 04/11/22  1127   GLUCOSE mg/dL 85 127* 114*   CREATININE mg/dL 0.93 0.90 0.81   SODIUM mmol/L 131* 127* 132*   POTASSIUM mmol/L 4.3 4.5 4.7   CHLORIDE mmol/L 101 97* 102   AST (SGOT) U/L 20 23 26   ALT (SGPT) U/L 14 13 16       Assessment:    Post-partum/postop Day:  2  Pre-e, BP controlled w NO meds  Plan:   • Routine postpartum/postop care  • Remove IV  • Importance of wound care/keep clean and dry              Electronically signed by Beata Castillo DO, 04/13/22, 9:14 AM EDT.

## 2022-04-13 NOTE — LACTATION NOTE
OLIVIA in to follow up with lactation progress. Mom has been regularly pumping and is getting up to 30 ml / pumping session. She states the pumping has been tender and has been using the 27 PLUS medela flange and OLIVIA provided a 27 LEGACY medela flange because she has used this in the past without problems.  LC steam sanitized her pumping parts in a Medela steam bag and discussed with mom ways to sanitize pumping parts when she goes home and discussed management of engorgement. Patient shows good understanding.

## 2022-04-13 NOTE — PLAN OF CARE
Problem: Adult Inpatient Plan of Care  Goal: Plan of Care Review  Outcome: Ongoing, Progressing  Goal: Patient-Specific Goal (Individualized)  Outcome: Ongoing, Progressing  Goal: Absence of Hospital-Acquired Illness or Injury  Outcome: Ongoing, Progressing  Intervention: Identify and Manage Fall Risk  Recent Flowsheet Documentation  Taken 2022 021 by Pam Fowler RNA  Safety Promotion/Fall Prevention: safety round/check completed  Taken 2022 by Pam Fowler RNA  Safety Promotion/Fall Prevention: safety round/check completed  Intervention: Prevent and Manage VTE (Venous Thromboembolism) Risk  Recent Flowsheet Documentation  Taken 2022 by Pam Fowler RNA  VTE Prevention/Management: sequential compression devices on  Goal: Optimal Comfort and Wellbeing  Outcome: Ongoing, Progressing  Goal: Readiness for Transition of Care  Outcome: Ongoing, Progressing     Problem:  Fall Injury Risk  Goal: Absence of Fall, Infant Drop and Related Injury  Outcome: Ongoing, Progressing  Intervention: Promote Injury-Free Environment  Recent Flowsheet Documentation  Taken 2022 by Pam Fowler RNA  Safety Promotion/Fall Prevention: safety round/check completed  Taken 2022 by Pam Fowler RNA  Safety Promotion/Fall Prevention: safety round/check completed     Problem: Adjustment to Role Transition (Postpartum  Delivery)  Goal: Successful Maternal Role Transition  Outcome: Ongoing, Progressing     Problem: Bleeding (Postpartum  Delivery)  Goal: Hemostasis  Outcome: Ongoing, Progressing     Problem: Infection (Postpartum  Delivery)  Goal: Absence of Infection Signs and Symptoms  Outcome: Ongoing, Progressing     Problem: Pain (Postpartum  Delivery)  Goal: Acceptable Pain Control  Outcome: Ongoing, Progressing     Problem: Postoperative Nausea and Vomiting (Postpartum  Delivery)  Goal: Nausea and  Vomiting Relief  Outcome: Ongoing, Progressing     Problem: Postoperative Urinary Retention (Postpartum  Delivery)  Goal: Effective Urinary Elimination  Outcome: Ongoing, Progressing     Problem: Breastfeeding  Goal: Effective Breastfeeding  Outcome: Ongoing, Progressing   Goal Outcome Evaluation:    Progressing as expected

## 2022-04-14 ENCOUNTER — TELEPHONE (OUTPATIENT)
Dept: OBSTETRICS AND GYNECOLOGY | Facility: CLINIC | Age: 32
End: 2022-04-14

## 2022-04-14 ENCOUNTER — APPOINTMENT (OUTPATIENT)
Dept: GENERAL RADIOLOGY | Facility: HOSPITAL | Age: 32
End: 2022-04-14

## 2022-04-14 ENCOUNTER — HOSPITAL ENCOUNTER (OUTPATIENT)
Facility: HOSPITAL | Age: 32
Discharge: HOME | End: 2022-04-17
Attending: OBSTETRICS & GYNECOLOGY | Admitting: OBSTETRICS & GYNECOLOGY

## 2022-04-14 PROBLEM — O16.9 ELEVATED BLOOD PRESSURE AFFECTING PREGNANCY, ANTEPARTUM: Status: RESOLVED | Noted: 2022-04-14 | Resolved: 2022-04-14

## 2022-04-14 PROBLEM — Z87.59 HISTORY OF PLACENTA ABRUPTION: Status: RESOLVED | Noted: 2022-04-11 | Resolved: 2022-04-14

## 2022-04-14 PROBLEM — O09.899 MATERNAL VARICELLA, NON-IMMUNE: Status: RESOLVED | Noted: 2022-04-11 | Resolved: 2022-04-14

## 2022-04-14 PROBLEM — O16.9 ELEVATED BLOOD PRESSURE AFFECTING PREGNANCY, ANTEPARTUM: Status: ACTIVE | Noted: 2022-04-14

## 2022-04-14 PROBLEM — Z34.80 SUPERVISION OF OTHER NORMAL PREGNANCY, ANTEPARTUM: Status: RESOLVED | Noted: 2021-09-21 | Resolved: 2022-04-14

## 2022-04-14 PROBLEM — O09.899 HX OF PRETERM DELIVERY, CURRENTLY PREGNANT: Status: RESOLVED | Noted: 2022-04-11 | Resolved: 2022-04-14

## 2022-04-14 PROBLEM — Z28.39 MATERNAL VARICELLA, NON-IMMUNE: Status: RESOLVED | Noted: 2022-04-11 | Resolved: 2022-04-14

## 2022-04-14 LAB
ALBUMIN SERPL-MCNC: 3.3 G/DL (ref 3.5–5.2)
ALBUMIN/GLOB SERPL: 1.3 G/DL
ALP SERPL-CCNC: 112 U/L (ref 39–117)
ALT SERPL W P-5'-P-CCNC: 27 U/L (ref 1–33)
ANION GAP SERPL CALCULATED.3IONS-SCNC: 8.5 MMOL/L (ref 5–15)
AST SERPL-CCNC: 39 U/L (ref 1–32)
BILIRUB SERPL-MCNC: <0.2 MG/DL (ref 0–1.2)
BUN SERPL-MCNC: 16 MG/DL (ref 6–20)
BUN/CREAT SERPL: 22.5 (ref 7–25)
CALCIUM SPEC-SCNC: 8.2 MG/DL (ref 8.6–10.5)
CHLORIDE SERPL-SCNC: 102 MMOL/L (ref 98–107)
CO2 SERPL-SCNC: 23.5 MMOL/L (ref 22–29)
CREAT SERPL-MCNC: 0.71 MG/DL (ref 0.57–1)
CREAT UR-MCNC: 18.5 MG/DL
DEPRECATED RDW RBC AUTO: 43.2 FL (ref 37–54)
EGFRCR SERPLBLD CKD-EPI 2021: 118.2 ML/MIN/1.73
ERYTHROCYTE [DISTWIDTH] IN BLOOD BY AUTOMATED COUNT: 13.4 % (ref 12.3–15.4)
GLOBULIN UR ELPH-MCNC: 2.5 GM/DL
GLUCOSE SERPL-MCNC: 95 MG/DL (ref 65–99)
HCT VFR BLD AUTO: 34.9 % (ref 34–46.6)
HGB BLD-MCNC: 11.2 G/DL (ref 12–15.9)
MCH RBC QN AUTO: 28.3 PG (ref 26.6–33)
MCHC RBC AUTO-ENTMCNC: 32.1 G/DL (ref 31.5–35.7)
MCV RBC AUTO: 88.1 FL (ref 79–97)
PLATELET # BLD AUTO: 247 10*3/MM3 (ref 140–450)
PMV BLD AUTO: 9.9 FL (ref 6–12)
POTASSIUM SERPL-SCNC: 5 MMOL/L (ref 3.5–5.2)
PROT ?TM UR-MCNC: 15.5 MG/DL
PROT SERPL-MCNC: 5.8 G/DL (ref 6–8.5)
PROT/CREAT UR: 0.84 MG/G{CREAT}
RBC # BLD AUTO: 3.96 10*6/MM3 (ref 3.77–5.28)
SODIUM SERPL-SCNC: 134 MMOL/L (ref 136–145)
WBC NRBC COR # BLD: 9.87 10*3/MM3 (ref 3.4–10.8)

## 2022-04-14 PROCEDURE — 0 MAGNESIUM SULFATE 20 GM/500ML SOLUTION: Performed by: OBSTETRICS & GYNECOLOGY

## 2022-04-14 PROCEDURE — 96375 TX/PRO/DX INJ NEW DRUG ADDON: CPT

## 2022-04-14 PROCEDURE — 96366 THER/PROPH/DIAG IV INF ADDON: CPT

## 2022-04-14 PROCEDURE — 71046 X-RAY EXAM CHEST 2 VIEWS: CPT

## 2022-04-14 PROCEDURE — 96372 THER/PROPH/DIAG INJ SC/IM: CPT

## 2022-04-14 PROCEDURE — 25010000002 HYDRALAZINE PER 20 MG: Performed by: OBSTETRICS & GYNECOLOGY

## 2022-04-14 PROCEDURE — 25010000002 ENOXAPARIN PER 10 MG: Performed by: OBSTETRICS & GYNECOLOGY

## 2022-04-14 PROCEDURE — 25010000002 FUROSEMIDE PER 20 MG: Performed by: OBSTETRICS & GYNECOLOGY

## 2022-04-14 PROCEDURE — 99218 PR INITIAL OBSERVATION CARE/DAY 30 MINUTES: CPT | Performed by: OBSTETRICS & GYNECOLOGY

## 2022-04-14 PROCEDURE — 84156 ASSAY OF PROTEIN URINE: CPT | Performed by: OBSTETRICS & GYNECOLOGY

## 2022-04-14 PROCEDURE — 80053 COMPREHEN METABOLIC PANEL: CPT | Performed by: OBSTETRICS & GYNECOLOGY

## 2022-04-14 PROCEDURE — 96365 THER/PROPH/DIAG IV INF INIT: CPT

## 2022-04-14 PROCEDURE — 85027 COMPLETE CBC AUTOMATED: CPT | Performed by: OBSTETRICS & GYNECOLOGY

## 2022-04-14 PROCEDURE — 82570 ASSAY OF URINE CREATININE: CPT | Performed by: OBSTETRICS & GYNECOLOGY

## 2022-04-14 RX ORDER — DOCUSATE SODIUM 100 MG/1
100 CAPSULE, LIQUID FILLED ORAL 2 TIMES DAILY PRN
Status: DISCONTINUED | OUTPATIENT
Start: 2022-04-14 | End: 2022-04-17 | Stop reason: HOSPADM

## 2022-04-14 RX ORDER — OXYCODONE HYDROCHLORIDE 5 MG/1
10 TABLET ORAL EVERY 4 HOURS PRN
Status: DISCONTINUED | OUTPATIENT
Start: 2022-04-14 | End: 2022-04-17 | Stop reason: HOSPADM

## 2022-04-14 RX ORDER — PRENATAL VIT/IRON FUM/FOLIC AC 27MG-0.8MG
1 TABLET ORAL DAILY
Status: DISCONTINUED | OUTPATIENT
Start: 2022-04-14 | End: 2022-04-17 | Stop reason: HOSPADM

## 2022-04-14 RX ORDER — SODIUM CHLORIDE 0.9 % (FLUSH) 0.9 %
3 SYRINGE (ML) INJECTION EVERY 12 HOURS SCHEDULED
Status: DISCONTINUED | OUTPATIENT
Start: 2022-04-14 | End: 2022-04-17 | Stop reason: HOSPADM

## 2022-04-14 RX ORDER — FERROUS SULFATE 325(65) MG
325 TABLET ORAL EVERY OTHER DAY
Refills: 1 | Status: DISCONTINUED | OUTPATIENT
Start: 2022-04-14 | End: 2022-04-17 | Stop reason: HOSPADM

## 2022-04-14 RX ORDER — HYDRALAZINE HYDROCHLORIDE 20 MG/ML
5-10 INJECTION INTRAMUSCULAR; INTRAVENOUS
Status: DISCONTINUED | OUTPATIENT
Start: 2022-04-14 | End: 2022-04-15

## 2022-04-14 RX ORDER — LABETALOL HYDROCHLORIDE 5 MG/ML
20 INJECTION, SOLUTION INTRAVENOUS ONCE
Status: COMPLETED | OUTPATIENT
Start: 2022-04-14 | End: 2022-04-14

## 2022-04-14 RX ORDER — CALCIUM GLUCONATE 94 MG/ML
1 INJECTION, SOLUTION INTRAVENOUS ONCE AS NEEDED
Status: DISCONTINUED | OUTPATIENT
Start: 2022-04-14 | End: 2022-04-15

## 2022-04-14 RX ORDER — OXYCODONE HYDROCHLORIDE 5 MG/1
5 TABLET ORAL EVERY 4 HOURS PRN
Status: DISCONTINUED | OUTPATIENT
Start: 2022-04-14 | End: 2022-04-17 | Stop reason: HOSPADM

## 2022-04-14 RX ORDER — NIFEDIPINE 10 MG/1
10-20 CAPSULE ORAL
Status: DISCONTINUED | OUTPATIENT
Start: 2022-04-14 | End: 2022-04-15

## 2022-04-14 RX ORDER — LABETALOL HYDROCHLORIDE 5 MG/ML
20 INJECTION, SOLUTION INTRAVENOUS ONCE
Status: DISCONTINUED | OUTPATIENT
Start: 2022-04-14 | End: 2022-04-15

## 2022-04-14 RX ORDER — MAGNESIUM SULFATE HEPTAHYDRATE 40 MG/ML
2 INJECTION, SOLUTION INTRAVENOUS CONTINUOUS
Status: DISCONTINUED | OUTPATIENT
Start: 2022-04-14 | End: 2022-04-15

## 2022-04-14 RX ORDER — ACETAMINOPHEN 325 MG/1
650 TABLET ORAL EVERY 6 HOURS PRN
Status: DISCONTINUED | OUTPATIENT
Start: 2022-04-14 | End: 2022-04-17 | Stop reason: HOSPADM

## 2022-04-14 RX ORDER — LIDOCAINE HYDROCHLORIDE 10 MG/ML
5 INJECTION, SOLUTION EPIDURAL; INFILTRATION; INTRACAUDAL; PERINEURAL AS NEEDED
Status: DISCONTINUED | OUTPATIENT
Start: 2022-04-14 | End: 2022-04-17 | Stop reason: HOSPADM

## 2022-04-14 RX ORDER — SODIUM CHLORIDE, SODIUM LACTATE, POTASSIUM CHLORIDE, CALCIUM CHLORIDE 600; 310; 30; 20 MG/100ML; MG/100ML; MG/100ML; MG/100ML
125 INJECTION, SOLUTION INTRAVENOUS CONTINUOUS
Status: DISCONTINUED | OUTPATIENT
Start: 2022-04-14 | End: 2022-04-15

## 2022-04-14 RX ORDER — SODIUM CHLORIDE 0.9 % (FLUSH) 0.9 %
10 SYRINGE (ML) INJECTION AS NEEDED
Status: DISCONTINUED | OUTPATIENT
Start: 2022-04-14 | End: 2022-04-17 | Stop reason: HOSPADM

## 2022-04-14 RX ORDER — FUROSEMIDE 10 MG/ML
20 INJECTION INTRAMUSCULAR; INTRAVENOUS ONCE
Status: COMPLETED | OUTPATIENT
Start: 2022-04-14 | End: 2022-04-14

## 2022-04-14 RX ORDER — LABETALOL HYDROCHLORIDE 5 MG/ML
20-80 INJECTION, SOLUTION INTRAVENOUS
Status: DISCONTINUED | OUTPATIENT
Start: 2022-04-14 | End: 2022-04-15

## 2022-04-14 RX ORDER — IBUPROFEN 600 MG/1
600 TABLET ORAL EVERY 6 HOURS PRN
Status: DISCONTINUED | OUTPATIENT
Start: 2022-04-14 | End: 2022-04-17 | Stop reason: HOSPADM

## 2022-04-14 RX ADMIN — ACETAMINOPHEN 650 MG: 325 TABLET ORAL at 23:09

## 2022-04-14 RX ADMIN — IBUPROFEN 600 MG: 600 TABLET ORAL at 20:07

## 2022-04-14 RX ADMIN — HYDRALAZINE HYDROCHLORIDE 5 MG: 20 INJECTION INTRAMUSCULAR; INTRAVENOUS at 19:43

## 2022-04-14 RX ADMIN — LABETALOL 20 MG/4 ML (5 MG/ML) INTRAVENOUS SYRINGE 20 MG: at 17:36

## 2022-04-14 RX ADMIN — MAGNESIUM SULFATE HEPTAHYDRATE 2 G/HR: 40 INJECTION, SOLUTION INTRAVENOUS at 20:00

## 2022-04-14 RX ADMIN — ENOXAPARIN SODIUM 40 MG: 100 INJECTION SUBCUTANEOUS at 20:09

## 2022-04-14 RX ADMIN — FUROSEMIDE 20 MG: 10 INJECTION, SOLUTION INTRAVENOUS at 20:08

## 2022-04-14 NOTE — NURSING NOTE
Dr. Lawson notified of lab results and blood pressures after labetalol and if ok to go down to chest xray. Orders received that pt could go down in wheelchair for chest xray.

## 2022-04-14 NOTE — TELEPHONE ENCOUNTER
"Patient and patients  called stating that they checked her BP and it was 157/103. She is also having some SOA and states her chest \"feels heavy\". She denies HA or visual changes. She delivered early due to pre-eclampsia. She was advised to been seen a the hospital due to the SOA and time of day. She voiced understanding of importance of evaluation.  "

## 2022-04-15 ENCOUNTER — APPOINTMENT (OUTPATIENT)
Dept: GENERAL RADIOLOGY | Facility: HOSPITAL | Age: 32
End: 2022-04-15

## 2022-04-15 PROBLEM — R93.89 INFILTRATE NOTED ON IMAGING STUDY: Status: ACTIVE | Noted: 2022-04-15

## 2022-04-15 LAB
ALBUMIN SERPL-MCNC: 3.3 G/DL (ref 3.5–5.2)
ALBUMIN/GLOB SERPL: 1.4 G/DL
ALP SERPL-CCNC: 103 U/L (ref 39–117)
ALT SERPL W P-5'-P-CCNC: 47 U/L (ref 1–33)
ANION GAP SERPL CALCULATED.3IONS-SCNC: 9.1 MMOL/L (ref 5–15)
AST SERPL-CCNC: 62 U/L (ref 1–32)
BASOPHILS # BLD AUTO: 0.06 10*3/MM3 (ref 0–0.2)
BASOPHILS NFR BLD AUTO: 0.7 % (ref 0–1.5)
BILIRUB SERPL-MCNC: 0.2 MG/DL (ref 0–1.2)
BUN SERPL-MCNC: 13 MG/DL (ref 6–20)
BUN/CREAT SERPL: 21.3 (ref 7–25)
CALCIUM SPEC-SCNC: 7.6 MG/DL (ref 8.6–10.5)
CHLORIDE SERPL-SCNC: 99 MMOL/L (ref 98–107)
CO2 SERPL-SCNC: 22.9 MMOL/L (ref 22–29)
CREAT SERPL-MCNC: 0.61 MG/DL (ref 0.57–1)
CRP SERPL-MCNC: 0.83 MG/DL (ref 0–0.5)
DEPRECATED RDW RBC AUTO: 41.5 FL (ref 37–54)
EGFRCR SERPLBLD CKD-EPI 2021: 124.3 ML/MIN/1.73
EOSINOPHIL # BLD AUTO: 0.17 10*3/MM3 (ref 0–0.4)
EOSINOPHIL NFR BLD AUTO: 1.9 % (ref 0.3–6.2)
ERYTHROCYTE [DISTWIDTH] IN BLOOD BY AUTOMATED COUNT: 13.3 % (ref 12.3–15.4)
GLOBULIN UR ELPH-MCNC: 2.4 GM/DL
GLUCOSE SERPL-MCNC: 84 MG/DL (ref 65–99)
HCT VFR BLD AUTO: 34 % (ref 34–46.6)
HGB BLD-MCNC: 10.9 G/DL (ref 12–15.9)
IMM GRANULOCYTES # BLD AUTO: 0.06 10*3/MM3 (ref 0–0.05)
IMM GRANULOCYTES NFR BLD AUTO: 0.7 % (ref 0–0.5)
LYMPHOCYTES # BLD AUTO: 2.48 10*3/MM3 (ref 0.7–3.1)
LYMPHOCYTES NFR BLD AUTO: 28.1 % (ref 19.6–45.3)
MCH RBC QN AUTO: 27.7 PG (ref 26.6–33)
MCHC RBC AUTO-ENTMCNC: 32.1 G/DL (ref 31.5–35.7)
MCV RBC AUTO: 86.3 FL (ref 79–97)
MONOCYTES # BLD AUTO: 0.59 10*3/MM3 (ref 0.1–0.9)
MONOCYTES NFR BLD AUTO: 6.7 % (ref 5–12)
NEUTROPHILS NFR BLD AUTO: 5.48 10*3/MM3 (ref 1.7–7)
NEUTROPHILS NFR BLD AUTO: 61.9 % (ref 42.7–76)
NRBC BLD AUTO-RTO: 0 /100 WBC (ref 0–0.2)
NT-PROBNP SERPL-MCNC: 256.3 PG/ML (ref 0–450)
PLATELET # BLD AUTO: 247 10*3/MM3 (ref 140–450)
PMV BLD AUTO: 9.8 FL (ref 6–12)
POTASSIUM SERPL-SCNC: 4.5 MMOL/L (ref 3.5–5.2)
PROCALCITONIN SERPL-MCNC: 0.08 NG/ML (ref 0–0.25)
PROT SERPL-MCNC: 5.7 G/DL (ref 6–8.5)
RBC # BLD AUTO: 3.94 10*6/MM3 (ref 3.77–5.28)
SARS-COV-2 RNA PNL SPEC NAA+PROBE: NOT DETECTED
SODIUM SERPL-SCNC: 131 MMOL/L (ref 136–145)
WBC NRBC COR # BLD: 8.84 10*3/MM3 (ref 3.4–10.8)

## 2022-04-15 PROCEDURE — 96372 THER/PROPH/DIAG INJ SC/IM: CPT

## 2022-04-15 PROCEDURE — 25010000002 FUROSEMIDE PER 20 MG: Performed by: FAMILY MEDICINE

## 2022-04-15 PROCEDURE — 85025 COMPLETE CBC W/AUTO DIFF WBC: CPT | Performed by: OBSTETRICS & GYNECOLOGY

## 2022-04-15 PROCEDURE — 83880 ASSAY OF NATRIURETIC PEPTIDE: CPT | Performed by: FAMILY MEDICINE

## 2022-04-15 PROCEDURE — 99224 PR SBSQ OBSERVATION CARE/DAY 15 MINUTES: CPT | Performed by: OBSTETRICS & GYNECOLOGY

## 2022-04-15 PROCEDURE — U0004 COV-19 TEST NON-CDC HGH THRU: HCPCS | Performed by: OBSTETRICS & GYNECOLOGY

## 2022-04-15 PROCEDURE — 96366 THER/PROPH/DIAG IV INF ADDON: CPT

## 2022-04-15 PROCEDURE — 0 MAGNESIUM SULFATE 20 GM/500ML SOLUTION: Performed by: OBSTETRICS & GYNECOLOGY

## 2022-04-15 PROCEDURE — 80053 COMPREHEN METABOLIC PANEL: CPT | Performed by: OBSTETRICS & GYNECOLOGY

## 2022-04-15 PROCEDURE — 84145 PROCALCITONIN (PCT): CPT | Performed by: FAMILY MEDICINE

## 2022-04-15 PROCEDURE — 96376 TX/PRO/DX INJ SAME DRUG ADON: CPT

## 2022-04-15 PROCEDURE — 71046 X-RAY EXAM CHEST 2 VIEWS: CPT

## 2022-04-15 PROCEDURE — 99220 PR INITIAL OBSERVATION CARE/DAY 70 MINUTES: CPT | Performed by: FAMILY MEDICINE

## 2022-04-15 PROCEDURE — 25010000002 ENOXAPARIN PER 10 MG: Performed by: OBSTETRICS & GYNECOLOGY

## 2022-04-15 PROCEDURE — 86140 C-REACTIVE PROTEIN: CPT | Performed by: FAMILY MEDICINE

## 2022-04-15 RX ORDER — FUROSEMIDE 10 MG/ML
20 INJECTION INTRAMUSCULAR; INTRAVENOUS ONCE
Status: COMPLETED | OUTPATIENT
Start: 2022-04-15 | End: 2022-04-15

## 2022-04-15 RX ORDER — ONDANSETRON 4 MG/1
4 TABLET, FILM COATED ORAL EVERY 6 HOURS PRN
Status: DISCONTINUED | OUTPATIENT
Start: 2022-04-15 | End: 2022-04-17 | Stop reason: HOSPADM

## 2022-04-15 RX ORDER — NIFEDIPINE 30 MG/1
30 TABLET, EXTENDED RELEASE ORAL
Status: DISCONTINUED | OUTPATIENT
Start: 2022-04-15 | End: 2022-04-16

## 2022-04-15 RX ORDER — NIFEDIPINE 10 MG/1
10 CAPSULE ORAL EVERY 4 HOURS PRN
Status: DISCONTINUED | OUTPATIENT
Start: 2022-04-15 | End: 2022-04-17 | Stop reason: HOSPADM

## 2022-04-15 RX ORDER — FAMOTIDINE 20 MG/1
20 TABLET, FILM COATED ORAL 2 TIMES DAILY PRN
Status: DISCONTINUED | OUTPATIENT
Start: 2022-04-15 | End: 2022-04-17 | Stop reason: HOSPADM

## 2022-04-15 RX ADMIN — MAGNESIUM SULFATE HEPTAHYDRATE 2 G/HR: 40 INJECTION, SOLUTION INTRAVENOUS at 04:36

## 2022-04-15 RX ADMIN — IBUPROFEN 600 MG: 600 TABLET ORAL at 22:34

## 2022-04-15 RX ADMIN — ENOXAPARIN SODIUM 40 MG: 100 INJECTION SUBCUTANEOUS at 20:09

## 2022-04-15 RX ADMIN — Medication 3 ML: at 08:20

## 2022-04-15 RX ADMIN — FUROSEMIDE 20 MG: 10 INJECTION, SOLUTION INTRAMUSCULAR; INTRAVENOUS at 15:54

## 2022-04-15 RX ADMIN — NIFEDIPINE 30 MG: 30 TABLET, FILM COATED, EXTENDED RELEASE ORAL at 08:20

## 2022-04-15 RX ADMIN — Medication 3 ML: at 20:10

## 2022-04-15 RX ADMIN — ACETAMINOPHEN 650 MG: 325 TABLET ORAL at 05:01

## 2022-04-15 RX ADMIN — ACETAMINOPHEN 650 MG: 325 TABLET ORAL at 10:39

## 2022-04-15 RX ADMIN — ACETAMINOPHEN 650 MG: 325 TABLET ORAL at 15:52

## 2022-04-15 RX ADMIN — VITAMIN A, VITAMIN C, VITAMIN D, VITAMIN E, THIAMINE, RIBOFLAVIN, NIACIN, VITAMIN B6, FOLIC ACID, VITAMIN B12, CALCIUM, IRON, ZINC, COPPER 1 TABLET: 4000; 120; 400; 22; 1.84; 3; 20; 10; 1; 12; 200; 27; 25; 2 TABLET ORAL at 08:20

## 2022-04-15 RX ADMIN — IBUPROFEN 600 MG: 600 TABLET ORAL at 08:20

## 2022-04-15 RX ADMIN — IBUPROFEN 600 MG: 600 TABLET ORAL at 01:57

## 2022-04-15 NOTE — PROGRESS NOTES
CAMELIA Sun   PROGRESS NOTE    Post-Op Day 4 S/P   Hospital day 2 status post readmit for postpartum preeclampsia    Subjective:  Feeling better.  Still has a mild cough.  No fever.  Tolerating regular diet without nausea or vomiting.  Ambulating without difficulty.  Voiding without difficulty.  Pumping for baby.  Baby is in the NICU.  Pain is controlled.      Objective      Temp:  [98 °F (36.7 °C)] 98 °F (36.7 °C)  Heart Rate:  [58-88] 71  Resp:  [15-18] 16  BP: (127-174)/(70-95) 131/84     Physical Exam:  GEN: alert and in no distress  Abdomen: fundus firm - below the umbilicus  abdomen soft + BS's  The abdomen is mildly tender around the incision.  Incision: clean, dry, and intact, healing well, no seroma, no swelling, well approximated  Extremi1+ edema, no redness or tenderness in the calves or thighsties:     Labs:  Lab Results (last 24 hours)     Procedure Component Value Units Date/Time    CBC (No Diff) [[id number]]  (Abnormal) Collected: 22    Specimen: Blood Updated: 22 172     WBC 9.87 10*3/mm3      RBC 3.96 10*6/mm3      Hemoglobin 11.2 g/dL      Hematocrit 34.9 %      MCV 88.1 fL      MCH 28.3 pg      MCHC 32.1 g/dL      RDW 13.4 %      RDW-SD 43.2 fl      MPV 9.9 fL      Platelets 247 10*3/mm3     Comprehensive Metabolic Panel [[id number]]  (Abnormal) Collected: 22    Specimen: Blood Updated: 22     Glucose 95 mg/dL      BUN 16 mg/dL      Creatinine 0.71 mg/dL      Sodium 134 mmol/L      Potassium 5.0 mmol/L      Chloride 102 mmol/L      CO2 23.5 mmol/L      Calcium 8.2 mg/dL      Total Protein 5.8 g/dL      Albumin 3.30 g/dL      ALT (SGPT) 27 U/L      AST (SGOT) 39 U/L      Alkaline Phosphatase 112 U/L      Total Bilirubin <0.2 mg/dL      Globulin 2.5 gm/dL      A/G Ratio 1.3 g/dL      BUN/Creatinine Ratio 22.5     Anion Gap 8.5 mmol/L      eGFR 118.2 mL/min/1.73      Comment: National Kidney Foundation and American Society of Nephrology (ASN) Task  Force recommended calculation based on the Chronic Kidney Disease Epidemiology Collaboration (CKD-EPI) equation refit without adjustment for race.       Narrative:      GFR Normal >60  Chronic Kidney Disease <60  Kidney Failure <15      Protein / Creatinine Ratio, Urine - Urine, Clean Catch [[id number]] Collected: 22    Specimen: Urine, Clean Catch Updated: 22     Creatinine, Urine 18.5 mg/dL      Total Protein, Urine 15.5 mg/dL      Protein/Creatinine Ratio, Urine 0.84             Assessment/Plan        Preeclampsia in postpartum period    H/O:     Infiltrate noted on imaging study    Assessment:    Ailyn Bedoya is Day 4  post-partum  , Low Transverse   And hospital day 2 status post readmit for postpartum preeclampsia    Plan:    Routine postpartum/postop care    Discontinue magnesium sulfate.  The patient's diuresis has been excellent.  Start   Procardia XL 30 mg daily for blood pressure control.  Procardia immediate release 10   mg every 4 hours as needed for systolic blood pressure 150 or more or diastolic blood   pressure 105 or more.    Maintain IV access    Awaiting a.m. labs    Chest x-ray reviewed and shows possibly worsening infiltrate.  The patient is still   afebrile.  Her white count is pending this morning.  Plan to consult with the medicine   service to assess the need for antibiotic therapy.    Abraham Lawson MD  04/15/22  07:13 EDT

## 2022-04-15 NOTE — DISCHARGE SUMMARY
[facility]         DISCHARGE SUMMARY    Patient Name: Ailyn Bedoya  : 1990  MRN: [id number]    Date of Admission: 2022  Date of Discharge: 2022  Primary Care Physician: Lazarus Luevano APRN    Consults     Date and Time Order Name Status Description    4/15/2022  8:56 AM Inpatient Hospitalist Consult Completed     4/15/2022  7:10 AM Inpatient Hospitalist Consult             Procedures:  None    Presenting Problem:   Elevated blood pressure affecting pregnancy, antepartum [O16.9]    Admitting Diagnosis:  31-year-old  3 para 2 AB 1 postpartum day 3 status post repeat low-transverse  delivery at 35 weeks and 5 days gestation for severe preeclampsia  Postpartum preeclampsia with severe range blood pressures    Discharge Diagnosis:  31-year-old  3 para 2 AB 1 postpartum day 3 status post repeat low-transverse  delivery at 35 weeks and 5 days gestation for severe preeclampsia  Postpartum preeclampsia with severe range blood pressures       Hospital Course     Hospital Course:  Ailyn Bedoya is a 31 y.o.  35w5d who presented on 2022 for elevated blood pressure and tightness in the chest.  She denied any shortness of air, chest pain, fever, chills, nausea vomiting, vision changes or right upper quadrant pain.  Upon her presentation she had severe range blood pressures on multiple readings.  She was admitted for magnesium sulfate for seizure prophylaxis and blood pressure control.  A chest x-ray was performed to evaluate for pulmonary edema.  That x-ray showed a small infiltrate possibly pneumonia on the right side.  Given the patient's white blood cell count was normal and she was afebrile antibiotics were withheld initially.  The patient was given a small dose of IV Lasix.  She diuresed very well overnight.  She did require 2 doses of antihypertensives IV.  Otherwise her blood pressures were normal to mild range.  Her repeat x-ray the next day  showed a worsening infiltrate.  The hospitalist service was consulted for their recommendations regarding possible antibiotic therapy.  The patient's magnesium was discontinued.  Started on oral Procardia for blood pressure control.    The consult resulted in diagnosis of probable fluid overload without infectious cause of infiltrate.  Patient responded well to 8 L of diuresis.  Blood pressure improved with nifedipine XL 30 mg twice daily.  Home after 3 days of hospitalization.  Patient instructed to keep blood pressure below 135/85 as weaning medication.  Patient understands may need medication for 6 to 8 weeks.    Day of Discharge     Vital Signs:  Temp:  [97.6 °F (36.4 °C)-98.2 °F (36.8 °C)] 98.2 °F (36.8 °C)  Heart Rate:  [63-87] 87  Resp:  [16-18] 18  BP: (128-158)/() 128/78    Pertinent  and/or Most Recent Results     LAB RESULTS:       Lab 04/15/22  0721 04/14/22 1709 04/13/22 0522 04/12/22 0529 04/11/22 0522   WBC 8.84 9.87 11.10* 19.27* 11.76*   HEMOGLOBIN 10.9* 11.2* 10.3* 11.1* 13.7   HEMATOCRIT 34.0 34.9 31.4* 34.7 41.8   PLATELETS 247 247 191 218 218   NEUTROS ABS 5.48  --  7.46*  --  6.66   IMMATURE GRANS (ABS) 0.06*  --  0.08*  --  0.06*   LYMPHS ABS 2.48  --  2.84  --  3.75*   MONOS ABS 0.59  --  0.63  --  0.81   EOS ABS 0.17  --  0.06  --  0.37   MCV 86.3 88.1 85.3 84.8 85.1   CRP 0.83*  --   --   --   --    PROCALCITONIN 0.08  --   --   --   --    PROTIME  --   --   --   --  9.4   APTT  --   --   --   --  24.7         Lab 04/16/22  0504 04/15/22  0721 04/14/22 1709 04/13/22 0522 04/12/22 0529   SODIUM 134* 131* 134* 131* 127*   POTASSIUM 4.9 4.5 5.0 4.3 4.5   CHLORIDE 99 99 102 101 97*   CO2 25.8 22.9 23.5 21.4* 20.1*   ANION GAP 9.2 9.1 8.5 8.6 9.9   BUN 11 13 16 22* 21*   CREATININE 0.67 0.61 0.71 0.93 0.90   EGFR 121.5 124.3 118.2 85.5 88.9   GLUCOSE 79 84 95 85 127*   CALCIUM 8.3* 7.6* 8.2* 6.4* 6.9*   MAGNESIUM 2.0  --   --   --   --          Lab 04/16/22  0504 04/15/22  0721  04/14/22  1709 04/13/22  0522 04/12/22  0529   TOTAL PROTEIN 5.4* 5.7* 5.8* 4.7* 5.2*   ALBUMIN 3.10* 3.30* 3.30* 2.70* 2.90*   GLOBULIN 2.3 2.4 2.5 2.0 2.3   ALT (SGPT) 50* 47* 27 14 13   AST (SGOT) 50* 62* 39* 20 23   BILIRUBIN 0.3 0.2 <0.2 <0.2 <0.2   ALK PHOS 98 103 112 93 119*         Lab 04/15/22  0721 04/11/22 0522   PROBNP 256.3  --    PROTIME  --  9.4   INR  --  0.87*           Lab 04/11/22 0522   ABO TYPING A   RH TYPING Positive   ANTIBODY SCREEN Negative     URINALYSIS@  Microbiology Results (last 10 days)     Procedure Component Value - Date/Time    COVID PRE-OP / PRE-PROCEDURE SCREENING ORDER (NO ISOLATION) - Swab, Nasopharynx [[id number]]  (Normal) Collected: 04/15/22 0812    Lab Status: Final result Specimen: Swab from Nasopharynx Updated: 04/15/22 1439    Narrative:      The following orders were created for panel order COVID PRE-OP / PRE-PROCEDURE SCREENING ORDER (NO ISOLATION) - Swab, Nasopharynx.  Procedure                               Abnormality         Status                     ---------                               -----------         ------                     COVID-19,APTIMA PANTHER(...[[id number]]  Normal              Final result                 Please view results for these tests on the individual orders.    COVID-19,APTIMA PANTHER(LEATHA),[facility], NP/OP SWAB IN UTM/VTM/SALINE TRANSPORT MEDIA,24 HR TAT - Swab, Nasopharynx [[id number]]  (Normal) Collected: 04/15/22 0812    Lab Status: Final result Specimen: Swab from Nasopharynx Updated: 04/15/22 1439     COVID19 Not Detected    Narrative:      Fact sheet for providers: https://www.fda.gov/media/433322/download     Fact sheet for patients: https://www.fda.gov/media/111686/download    Test performed by RT PCR.    COVID PRE-OP / PRE-PROCEDURE SCREENING ORDER (NO ISOLATION) - Swab, Nasopharynx [[id number]]  (Normal) Collected: 04/11/22 0609    Lab Status: Final result Specimen: Swab from Nasopharynx Updated: 04/11/22 1345    Narrative:       The following orders were created for panel order COVID PRE-OP / PRE-PROCEDURE SCREENING ORDER (NO ISOLATION) - Swab, Nasopharynx.  Procedure                               Abnormality         Status                     ---------                               -----------         ------                     COVID-19,APTIMA PANTHER(...[[id number]]  Normal              Final result                 Please view results for these tests on the individual orders.    COVID-19,APTIMA PANTHER(LEATHA),[facility], NP/OP SWAB IN UTM/VTM/SALINE TRANSPORT MEDIA,24 HR TAT - Swab, Nasopharynx [[id number]]  (Normal) Collected: 04/11/22 0609    Lab Status: Final result Specimen: Swab from Nasopharynx Updated: 04/11/22 1345     COVID19 Not Detected    Narrative:      Fact sheet for providers: https://www.fda.gov/media/363481/download     Fact sheet for patients: https://www.fda.gov/media/830890/download    Test performed by RT PCR.    Group B Streptococcus Culture - Swab, Vaginal/Rectum [[id number]]  (Normal) Collected: 04/11/22 0523    Lab Status: Final result Specimen: Swab from Vaginal/Rectum Updated: 04/13/22 0707     Group B Strep Culture No Group B Streptococcus isolated      XR Chest PA & Lateral    Result Date: 4/15/2022  Impression:  Worsening progression is suggested radiographically with new or increased infiltrates on the left.  The findings may represent infectious multifocal pneumonia.  Septic emboli would be in the differential diagnosis but are probably less likely.  Consider imaging follow-up to ensure a benign progression.  Chest CT examination may be helpful in further imaging assessment if clinically warranted contraindicated.  No cardiac enlargement is suspected.  No pneumothorax.  Please see above comments for further detail.      COMMENT:  Part of this note is an electronic transcription of spoken language to printed text. The electronic translation/transcription may permit erroneous, or at times, nonsensical (or  even sensical) words or phrases to be inadvertently transcribed or omitted; this  has reviewed the note for such errors (as well as additional errors); however, some may still exist.  PATI CLARK JR, MD       Electronically Signed and Approved By: PATI CLARK JR, MD on 4/15/2022 at 6:54              XR Chest PA & Lateral    Result Date: 4/14/2022  Impression:  Focal opacity within right mid lung, concerning for pneumonia.     ASHVIN EDMOND MD       Electronically Signed and Approved By: ASHVIN EDMOND MD on 4/14/2022 at 19:10           Results for orders placed during the hospital encounter of 08/23/21    Doppler Arterial Multi Level Lower Extremity - Bilateral CAR    Interpretation Summary  · Right Conclusion: The right PHILLIP is normal. Mild digital ischemia.  · Left Conclusion: The left PHILLIP is normal. Mild digital ischemia.  · Normal arterial evaluation of both lower extremities. Mild digit pressure decrease bilaterally.  Results for orders placed during the hospital encounter of 08/23/21    Doppler Arterial Multi Level Lower Extremity - Bilateral CAR    Interpretation Summary  · Right Conclusion: The right PHILLIP is normal. Mild digital ischemia.  · Left Conclusion: The left PHILLIP is normal. Mild digital ischemia.  · Normal arterial evaluation of both lower extremities. Mild digit pressure decrease bilaterally.        Discharge Details        Discharge Medications      New Medications      Instructions Start Date   NIFEdipine XL 30 MG 24 hr tablet  Commonly known as: PROCARDIA XL   30 mg, Oral, 2 Times Daily         Continue These Medications      Instructions Start Date   acetaminophen 325 MG tablet  Commonly known as: Tylenol   650 mg, Oral, Every 6 Hours PRN      docusate sodium 100 MG capsule  Commonly known as: Colace   100 mg, Oral, 2 Times Daily PRN      FeroSul 325 (65 FE) MG tablet  Generic drug: ferrous sulfate   325 mg, Oral, Every Other Day      ibuprofen 600 MG tablet  Commonly known  as: ADVIL,MOTRIN   600 mg, Oral, Every 6 Hours PRN      PRENATAL 1 PO   Oral             No Known Allergies    Discharge Disposition:   Home, self-care    Discharge Condition:  Good    Diet:   Regular    Discharge Activity:   Activity Instructions    Continue activity as tolerated and as directed by physician.        Pelvic rest for 6 weeks, no intercourse for 6 weeks, no tampons or douching for 6 weeks, nothing in the vagina for 6 weeks  No driving for 2 weeks  No lifting more than 15 to 20 pounds for 2 weeks  No tub baths for 2 weeks, but may shower  Keep the incision clean and dry    Call the office or go to the emergency department for temperature greater than 100 °F, shortness of breath or chest pain, excessive nausea or vomiting, pain that is worsening despite current pain medications, redness, swelling, or drainage from the incision site, vaginal bleeding soaking a pad in less than 1 hour.    Follow Up:  Future Appointments   Date Time Provider Department Center   4/19/2022  1:00 PM Beata Castillo DO [facility]   5/17/2022  3:40 PM Beata Castillo DO [facility] [place] [facility]       Electronically signed by Abraham Lawson MD, 04/15/22, 7:16 AM EDT.

## 2022-04-15 NOTE — LACTATION NOTE
OLIVIA in to see this P2 patient who has been readmitted for blood pressure management. She has continued to pump regularly and is getting up to 60 ml/pumping session. She states her nipples are tender and is now using nipple cream while pumping and this has resolved the issue. OLIVIA provided more pumping supplies and reviewed with her pumping, labeling and storage guidelines. She states she will not be discharged until tomorrow. She shows good understanding.

## 2022-04-15 NOTE — NURSING NOTE
Notified by nurse extern, Annamaria, of patient's blood pressure took at 1030. Went to evaluate patient. Patient complaining of headache and overall not feeling well. Patient reports a lot of pressure in her head. Discussed Procardia side effects with patient. Advised patient will give her Tylenol and an ice pack for her head. Advised patient to rest and try to sleep in between pumping breastmilk for infant since patient did not rest last night. Notified patient I will come back to evaluate and see if her headache has eased, and will reevaluate her blood pressure at that time. Patient verbalized understanding.

## 2022-04-15 NOTE — PLAN OF CARE
Problem: Adult Inpatient Plan of Care  Goal: Absence of Hospital-Acquired Illness or Injury  Intervention: Identify and Manage Fall Risk  Recent Flowsheet Documentation  Taken 4/15/2022 1600 by Arely Brewer RN  Safety Promotion/Fall Prevention: safety round/check completed  Taken 4/15/2022 1430 by Arely Brewer RN  Safety Promotion/Fall Prevention: safety round/check completed  Taken 4/15/2022 1315 by Arely Brewer RN  Safety Promotion/Fall Prevention: safety round/check completed  Taken 4/15/2022 1130 by Arely Brewer RN  Safety Promotion/Fall Prevention: safety round/check completed  Taken 4/15/2022 1030 by Arely Brewer RN  Safety Promotion/Fall Prevention: safety round/check completed  Taken 4/15/2022 0930 by Arely Brewer RN  Safety Promotion/Fall Prevention: safety round/check completed  Taken 4/15/2022 0800 by Arely Brewer RN  Safety Promotion/Fall Prevention: safety round/check completed     Problem: Hypertensive Disorders in Pregnancy  Goal: Maternal-Fetal Stabilization  Outcome: Ongoing, Progressing   Goal Outcome Evaluation:  Plan of Care Reviewed With: patient     Progress: improving     Patient reports feeling much better this evening after receiving second dose of Lasix.

## 2022-04-15 NOTE — NURSING NOTE
At patient's bedside. Patient sitting up in bed eating. Patient reports feeling much better after receiving second dose of Lasix. Patient reports she feels like she has less pressure on her chest since receiving the second dose.

## 2022-04-15 NOTE — H&P
"CAMELIA Sun  Obstetric History and Physical    Chief Complaint:  Elevated BP    Subjective:  The patient is a 31 y.o.  who is postpartum day 3 status post a repeat  delivery at 35 weeks and 5 days gestation for severe preeclampsia.  She presented to labor and delivery today with complaints of elevated blood pressure and chest heaviness.  The patient states she started feeling poorly earlier today.  She denies shortness of air or chest pain but just states that her chest felt \"heavy\".  She checked her blood pressure and her blood pressure was in the 160s over 90s.  She did not go home on blood pressure medicines after her delivery.  Her blood pressures were normal to mild range elevated.  She is still having significant swelling.  Her lochia is appropriate.  She denies any fever or chills.  She has a mild cough.  Her infant is still in the NICU.  She is pumping.    Her prenatal care is complicated by: Prior , Severe preeclampsia    The following portions of the patients history were reviewed and updated as appropriate: current medications, allergies, past medical history, past surgical history, past family history, past social history and problem list .     Prenatal Information:  Prenatal Results     POC Urine Glucose/Protein     Test Value Reference Range Date Time    Urine Glucose  Negative mg/dL Negative, 1000 mg/dL (3+) 22    Urine Protein  300 mg/dL mg/dL Negative 2215          Initial Prenatal Labs     Test Value Reference Range Date Time    Hemoglobin  11.2 g/dL 12.0 - 15.9 22 1709       10.3 g/dL 12.0 - 15.9 22 05       11.1 g/dL 12.0 - 15.9 22 0529       13.7 g/dL 12.0 - 15.9 22 05       12.2 g/dL 12.0 - 15.9 22 1633       12.6 g/dL 12.0 - 15.9 21 1620       13.5 g/dL 12.0 - 15.9 21 1415    Hematocrit  34.9 % 34.0 - 46.6 22 1709       31.4 % 34.0 - 46.6 22       34.7 % 34.0 - 46.6 22 0529       41.8 " % 34.0 - 46.6 04/11/22 0522       36.0 % 34.0 - 46.6 02/08/22 1633       38.6 % 34.0 - 46.6 09/21/21 1620       42.9 % 34.0 - 46.6 08/18/21 1415    Platelets  247 10*3/mm3 140 - 450 04/14/22 1709       191 10*3/mm3 140 - 450 04/13/22 0522       218 10*3/mm3 140 - 450 04/12/22 0529       218 10*3/mm3 140 - 450 04/11/22 0522       268 10*3/mm3 140 - 450 02/08/22 1633       265 10*3/mm3 140 - 450 09/21/21 1620       269 10*3/mm3 140 - 450 08/18/21 1415    Rubella IgG  2.03 index Immune >0.99 09/21/21 1620    Hepatitis B SAg  Non-Reactive  Non-Reactive 09/21/21 1620    Hepatitis C Ab  Non-Reactive  Non-Reactive 09/21/21 1620    RPR  Non-Reactive  Non-Reactive 09/21/21 1620    ABO  A   04/11/22 0522    Rh  Positive   04/11/22 0522    Antibody Screen  Negative   04/11/22 0522       Negative   09/21/21 1620    HIV  Non-Reactive  Non-Reactive 09/21/21 1620    Urine Culture  No growth   09/21/21 1553    Gonorrhea  Negative  Negative 09/21/21 1553    Chlamydia  Negative  Negative 09/21/21 1553    TSH  1.080 uIU/mL 0.270 - 4.200 08/18/21 1415    HgB A1c   5.03 % 4.80 - 5.60 08/18/21 1415          2nd and 3rd Trimester     Test Value Reference Range Date Time    Hemoglobin (repeated)  11.2 g/dL 12.0 - 15.9 04/14/22 1709       10.3 g/dL 12.0 - 15.9 04/13/22 0522       11.1 g/dL 12.0 - 15.9 04/12/22 0529       13.7 g/dL 12.0 - 15.9 04/11/22 0522       12.2 g/dL 12.0 - 15.9 02/08/22 1633    Hematocrit (repeated)  34.9 % 34.0 - 46.6 04/14/22 1709       31.4 % 34.0 - 46.6 04/13/22 0522       34.7 % 34.0 - 46.6 04/12/22 0529       41.8 % 34.0 - 46.6 04/11/22 0522       36.0 % 34.0 - 46.6 02/08/22 1633    Platelets   247 10*3/mm3 140 - 450 04/14/22 1709       191 10*3/mm3 140 - 450 04/13/22 0522       218 10*3/mm3 140 - 450 04/12/22 0529       218 10*3/mm3 140 - 450 04/11/22 0522       268 10*3/mm3 140 - 450 02/08/22 1633       265 10*3/mm3 140 - 450 09/21/21 1620       269 10*3/mm3 140 - 450 08/18/21 1415    GCT  179 mg/dL 65 - 139  02/08/22 1633    Antibody Screen (repeated)  Negative   04/11/22 0522    GTT Fasting        GTT 1 Hr        GTT 2 Hr        GTT 3 Hr        Group B Strep  No Group B Streptococcus isolated   04/11/22 0523          Drug Screening     Test Value Reference Range Date Time    Amphetamine Screen        Barbiturate Screen        Benzodiazepine Screen        Methadone Screen        Phencyclidine Screen        Opiates Screen        THC Screen        Cocaine Screen        Propoxyphene Screen        Buprenorphine Screen        Methamphetamine Screen        Oxycodone Screen        Tricyclic Antidepressants Screen              Other (Risk screening)     Test Value Reference Range Date Time    Varicella IgG  <135 index Immune >165 09/21/21 1620    Parvovirus IgG        CMV IgG        Cystic Fibrosis        Hemoglobin electrophoresis        NIPT        MSAFP-4        AFP (for NTD only)              Legend    ^: Historical                      External Prenatal Results     Pregnancy Outside Results - Transcribed From Office Records - See Scanned Records For Details     Test Value Date Time    ABO  A  04/11/22 0522    Rh  Positive  04/11/22 0522    Antibody Screen  Negative  04/11/22 0522       Negative  09/21/21 1620    Varicella IgG  <135 index 09/21/21 1620    Rubella  2.03 index 09/21/21 1620    Hgb  11.2 g/dL 04/14/22 1709       10.3 g/dL 04/13/22 0522       11.1 g/dL 04/12/22 0529       13.7 g/dL 04/11/22 0522       12.2 g/dL 02/08/22 1633       12.6 g/dL 09/21/21 1620       13.5 g/dL 08/18/21 1415    Hct  34.9 % 04/14/22 1709       31.4 % 04/13/22 0522       34.7 % 04/12/22 0529       41.8 % 04/11/22 0522       36.0 % 02/08/22 1633       38.6 % 09/21/21 1620       42.9 % 08/18/21 1415    Glucose Fasting GTT       Glucose Tolerance Test 1 hour       Glucose Tolerance Test 3 hour       Gonorrhea (discrete)  Negative  09/21/21 1553    Chlamydia (discrete)  Negative  09/21/21 1553    RPR  Non-Reactive  09/21/21 1620    VDRL        Syphilis Antibody       HBsAg  Non-Reactive  21 1620    Herpes Simplex Virus PCR       Herpes Simplex VIrus Culture       HIV  Non-Reactive  21 1620    Hep C RNA Quant PCR       Hep C Antibody  Non-Reactive  21 1620    AFP       Group B Strep  No Group B Streptococcus isolated  22 0523    GBS Susceptibility to Clindamycin       GBS Susceptibility to Erythromycin       Fetal Fibronectin       Genetic Testing, Maternal Blood             Drug Screening     Test Value Date Time    Urine Drug Screen       Amphetamine Screen       Barbiturate Screen       Benzodiazepine Screen       Methadone Screen       Phencyclidine Screen       Opiates Screen       THC Screen       Cocaine Screen       Propoxyphene Screen       Buprenorphine Screen       Methamphetamine Screen       Oxycodone Screen       Tricyclic Antidepressants Screen             Legend    ^: Historical                        Past OB History:  OB History    Para Term  AB Living   3 2 0 2 1 2   SAB IAB Ectopic Molar Multiple Live Births   1 0 0 0 0 2      # Outcome Date GA Lbr Siva/2nd Weight Sex Delivery Anes PTL Lv   3  22 35w5d  2200 g (4 lb 13.6 oz) F CS-LTranv Spinal N BRADY      Name: KUSUM BYRNES      Apgar1: 8  Apgar5: 9   2  19 33w5d  1984 g (4 lb 6 oz) M CS-Unspec   BRADY      Complications: NOLVIA (amniotic fluid index) borderline low   1 SAB      SAB          Past Medical History:  Past Medical History:   Diagnosis Date   • IBS (irritable bowel syndrome)    • Mild intermittent asthma - dx as a child, no meds    • Ovarian cyst    • Scoliosis        Past Surgical History:  Past Surgical History:   Procedure Laterality Date   •  SECTION      LTCS  Preen 33+5 NRFHR, emergent   •  SECTION N/A 2022    Procedure:  SECTION REPEAT;  Surgeon: Beata Castillo DO;  Location: [facility] LABOR DELIVERY;  Service: Obstetrics/Gynecology;  Laterality: N/A;   • DILATATION AND  CURETTAGE      2018 Preen MAB, 6weeks by US, 9weeks by LMP       Family History:  Family History   Problem Relation Age of Onset   • No Known Problems Father        Social History:   reports that she has never smoked. She has never used smokeless tobacco.   reports no history of alcohol use.   reports no history of drug use.    Medications:  Loratadine, Prenatal MV-Min-Fe Fum-FA-DHA, acetaminophen, docusate sodium, ferrous sulfate, and ibuprofen    Allergies:  No Known Allergies    Review of Systems:  Positive for headache, fatigue, vaginal bleeding, chest heaviness, swelling, abdominal pain  Negative for fever, chills, nausea, vomiting, vision changes, right upper quadrant or epigastric pain    Objective     Vital Signs:  Visit Vitals  /82 (BP Location: Left arm, Patient Position: Sitting)   Pulse 69   Resp 15   LMP 08/04/2021   SpO2 98%     Physical Exam:    General:  alert, well appearing, anxious, mildly uncomfortable  HEENT: PERRLA, extra ocular movement intact, sclera clear, anicteric and neck supple with midline trachea  Cardiovascular: normal rate, regular rhythm,  no murmurs, rubs, or gallops  Lungs: clear to auscultation, no wheezes, rales or rhonchi, symmetric air entry  Abdomen: fundus soft, nontender and well below the umbilicus.  The abdomen is soft, nondistended with some mild tenderness around the incision.  The incision is clean, dry, intact and well approximated.  There is no signs of infection.  Extremities: 2+ edema 3+ bilaterally    Labs:   Lab Results (last 24 hours)     Procedure Component Value Units Date/Time    CBC (No Diff) [[id number]]  (Abnormal) Collected: 04/14/22 1709    Specimen: Blood Updated: 04/14/22 1722     WBC 9.87 10*3/mm3      RBC 3.96 10*6/mm3      Hemoglobin 11.2 g/dL      Hematocrit 34.9 %      MCV 88.1 fL      MCH 28.3 pg      MCHC 32.1 g/dL      RDW 13.4 %      RDW-SD 43.2 fl      MPV 9.9 fL      Platelets 247 10*3/mm3     Comprehensive Metabolic Panel [[id number]]   (Abnormal) Collected: 22 1709    Specimen: Blood Updated: 22 1748     Glucose 95 mg/dL      BUN 16 mg/dL      Creatinine 0.71 mg/dL      Sodium 134 mmol/L      Potassium 5.0 mmol/L      Chloride 102 mmol/L      CO2 23.5 mmol/L      Calcium 8.2 mg/dL      Total Protein 5.8 g/dL      Albumin 3.30 g/dL      ALT (SGPT) 27 U/L      AST (SGOT) 39 U/L      Alkaline Phosphatase 112 U/L      Total Bilirubin <0.2 mg/dL      Globulin 2.5 gm/dL      A/G Ratio 1.3 g/dL      BUN/Creatinine Ratio 22.5     Anion Gap 8.5 mmol/L      eGFR 118.2 mL/min/1.73      Comment: National Kidney Foundation and American Society of Nephrology (ASN) Task Force recommended calculation based on the Chronic Kidney Disease Epidemiology Collaboration (CKD-EPI) equation refit without adjustment for race.       Narrative:      GFR Normal >60  Chronic Kidney Disease <60  Kidney Failure <15      Protein / Creatinine Ratio, Urine - Urine, Clean Catch [[id number]] Collected: 22 182    Specimen: Urine, Clean Catch Updated: 22 1853     Creatinine, Urine 18.5 mg/dL      Total Protein, Urine 15.5 mg/dL      Protein/Creatinine Ratio, Urine 0.84           Assessment:  31 y.o.  currently at 35w5d    Preeclampsia in postpartum period    H/O:         Plan:  The patient has persistently severe range elevation in her blood pressures.  Her liver enzymes are also slightly elevated.  Her urine PC ratio still severely elevated.  All these are consistent with postpartum preeclampsia.  I recommended admission with magnesium sulfate for seizure prophylaxis and IV antihypertensives.  Given the patient's significant edema plan Lasix 20 mg IV x1.  Strict I's and O's but given patient is voiding well plan to try to avoid a Manning catheter.  Okay for regular diet  The patient checks x-ray was reviewed.  She has no significant symptoms consistent with pneumonia.  Given the setting of preeclampsia, I would more strongly suspect some mild  pulmonary edema.  We will repeat her x-ray in the morning after the Lasix and see if her imaging is improved.  If there appears to be persistent infiltrate then we will consult with the medicine service for possible antibiotic therapy.  The patient is afebrile and she has a normal white blood cell count.  Motrin Tylenol primary pain control.  Oxycodone has been ordered for breakthrough pain.  Plan Lovenox for DVT prophylaxis  Plan of care has been reviewed with patient  Risks, benefits of treatment plan have been discussed.  All questions have been answered.    Abraham Lawson MD  4/14/2022  20:01 EDT

## 2022-04-16 LAB
ALBUMIN SERPL-MCNC: 3.1 G/DL (ref 3.5–5.2)
ALBUMIN/GLOB SERPL: 1.3 G/DL
ALP SERPL-CCNC: 98 U/L (ref 39–117)
ALT SERPL W P-5'-P-CCNC: 50 U/L (ref 1–33)
ANION GAP SERPL CALCULATED.3IONS-SCNC: 9.2 MMOL/L (ref 5–15)
AST SERPL-CCNC: 50 U/L (ref 1–32)
BILIRUB SERPL-MCNC: 0.3 MG/DL (ref 0–1.2)
BUN SERPL-MCNC: 11 MG/DL (ref 6–20)
BUN/CREAT SERPL: 16.4 (ref 7–25)
CALCIUM SPEC-SCNC: 8.3 MG/DL (ref 8.6–10.5)
CHLORIDE SERPL-SCNC: 99 MMOL/L (ref 98–107)
CO2 SERPL-SCNC: 25.8 MMOL/L (ref 22–29)
CREAT SERPL-MCNC: 0.67 MG/DL (ref 0.57–1)
EGFRCR SERPLBLD CKD-EPI 2021: 121.5 ML/MIN/1.73
GLOBULIN UR ELPH-MCNC: 2.3 GM/DL
GLUCOSE SERPL-MCNC: 79 MG/DL (ref 65–99)
MAGNESIUM SERPL-MCNC: 2 MG/DL (ref 1.6–2.6)
POTASSIUM SERPL-SCNC: 4.9 MMOL/L (ref 3.5–5.2)
PROT SERPL-MCNC: 5.4 G/DL (ref 6–8.5)
SODIUM SERPL-SCNC: 134 MMOL/L (ref 136–145)
WHOLE BLOOD HOLD SPECIMEN: NORMAL

## 2022-04-16 PROCEDURE — 80053 COMPREHEN METABOLIC PANEL: CPT | Performed by: FAMILY MEDICINE

## 2022-04-16 PROCEDURE — 99225 PR SBSQ OBSERVATION CARE/DAY 25 MINUTES: CPT | Performed by: FAMILY MEDICINE

## 2022-04-16 PROCEDURE — 99224 PR SBSQ OBSERVATION CARE/DAY 15 MINUTES: CPT | Performed by: OBSTETRICS & GYNECOLOGY

## 2022-04-16 PROCEDURE — 25010000002 ENOXAPARIN PER 10 MG: Performed by: OBSTETRICS & GYNECOLOGY

## 2022-04-16 PROCEDURE — 96372 THER/PROPH/DIAG INJ SC/IM: CPT

## 2022-04-16 PROCEDURE — 83735 ASSAY OF MAGNESIUM: CPT | Performed by: FAMILY MEDICINE

## 2022-04-16 RX ORDER — NIFEDIPINE 30 MG/1
30 TABLET, EXTENDED RELEASE ORAL 2 TIMES DAILY
Status: DISCONTINUED | OUTPATIENT
Start: 2022-04-16 | End: 2022-04-17 | Stop reason: HOSPADM

## 2022-04-16 RX ADMIN — FERROUS SULFATE TAB 325 MG (65 MG ELEMENTAL FE) 325 MG: 325 (65 FE) TAB at 07:59

## 2022-04-16 RX ADMIN — IBUPROFEN 600 MG: 600 TABLET ORAL at 10:30

## 2022-04-16 RX ADMIN — NIFEDIPINE 10 MG: 10 CAPSULE ORAL at 02:31

## 2022-04-16 RX ADMIN — VITAMIN A, VITAMIN C, VITAMIN D, VITAMIN E, THIAMINE, RIBOFLAVIN, NIACIN, VITAMIN B6, FOLIC ACID, VITAMIN B12, CALCIUM, IRON, ZINC, COPPER 1 TABLET: 4000; 120; 400; 22; 1.84; 3; 20; 10; 1; 12; 200; 27; 25; 2 TABLET ORAL at 07:59

## 2022-04-16 RX ADMIN — NIFEDIPINE 30 MG: 30 TABLET, FILM COATED, EXTENDED RELEASE ORAL at 20:38

## 2022-04-16 RX ADMIN — ENOXAPARIN SODIUM 40 MG: 100 INJECTION SUBCUTANEOUS at 20:10

## 2022-04-16 RX ADMIN — NIFEDIPINE 30 MG: 30 TABLET, FILM COATED, EXTENDED RELEASE ORAL at 07:59

## 2022-04-16 RX ADMIN — IBUPROFEN 600 MG: 600 TABLET ORAL at 20:10

## 2022-04-16 NOTE — CONSULTS
[facility]  Date: 4/15/2022   Patient Name: Ailyn Bedoya  : 1990  MRN: [id number]  Primary Care Physician:  Lazarus Luevano APRN  Date of admission: 2022    Subjective   Subjective     Chief Complaint: Chest pressure infiltrate concern for pneumonia    HPI:    Ailyn Bedoya is a 31 y.o. female past medical history significant for IBS, mild intermittent asthma recently underwent  at 35 and 5 due to preeclampsia presents postpartum day 3 complaining of chest heaviness.  Has associated shortness of breath nonproductive cough and flushing.  Patient denies fever denies chills denies sick contacts.  Blood pressure found to be elevated protein creatinine ratio found to be elevated.  Patient admitted by Dr. Lawson diagnosed with postpartum preeclampsia given IV Lasix x1 as well as Procardia and magnesium.  Chest x-ray on admission demonstrated focal opacity within the right midlung.  Patient remains intermittently hypertensive.  Patient is afebrile heart rate within normal limits.  Satting well on room air.  Patient reports she is still having chest heaviness some shortness of breath with lying down improved with sitting up.  Patient endorses increased swelling bilateral extremities and hands.  Patient continues to endorse nonproductive cough.  BNP within normal limits.  Serum sodium slightly low.  ALT AST mildly elevated.  Serum potassium within normal limits.  CRP very slightly elevated.  Procalcitonin within normal limits.  Hemoglobin trending down very slightly.  White blood cell count within normal limits.  COVID not detected by PCR.  Repeat chest x-ray personally reviewed demonstrated a right-sided infiltrate with new left-sided infiltrate.  No effusion appreciated.  Protein creatinine ratio improved.  Patient youngest child remains admitted to the NICU.  Patient is pumping while admitted.  No other issues per nursing.    Personal History     Past Medical  History:  Past Medical History:   Diagnosis Date   • IBS (irritable bowel syndrome)    • Mild intermittent asthma - dx as a child, no meds    • Ovarian cyst    • Scoliosis         Past Surgical History:  Past Surgical History:   Procedure Laterality Date   •  SECTION      LTCS  Preen 33+5 NRFHR, emergent   •  SECTION N/A 2022    Procedure:  SECTION REPEAT;  Surgeon: Jonathan, Beata, ;  Location: [facility] LABOR DELIVERY;  Service: Obstetrics/Gynecology;  Laterality: N/A;   • DILATATION AND CURETTAGE      2018 Preen MAB, 6weeks by US, 9weeks by LMP        Family History:   Family History   Problem Relation Age of Onset   • No Known Problems Father         Social History:   Social History     Socioeconomic History   • Marital status:      Spouse name: Nithin   • Number of children: 1   • Years of education: 18   • Highest education level: Bachelor's degree (e.g., BA, AB, BS)   Tobacco Use   • Smoking status: Never Smoker   • Smokeless tobacco: Never Used   Vaping Use   • Vaping Use: Never used   Substance and Sexual Activity   • Alcohol use: No   • Drug use: No   • Sexual activity: Yes     Partners: Male        Home Medications:  Loratadine, Prenatal MV-Min-Fe Fum-FA-DHA, acetaminophen, docusate sodium, ferrous sulfate, and ibuprofen    Allergies:  No Known Allergies    Review of Systems   Constitutional: Negative for fatigue and fever.   HENT: Negative for sore throat and trouble swallowing.    Eyes: Negative for pain and discharge.   Respiratory: Positive for cough and shortness of breath.    Cardiovascular: Positive for leg swelling. Negative for chest pain and palpitations.        Chest heaviness   Gastrointestinal: Negative for abdominal pain, nausea and vomiting.   Endocrine: Negative for cold intolerance and heat intolerance.   Genitourinary: Negative for difficulty urinating and dysuria.   Musculoskeletal: Negative for back pain and neck stiffness.   Skin: Negative for color  change and rash.   Neurological: Negative for syncope and headaches.        Patient reports feeling more tremulous when she has chest heaviness.   Hematological: Negative for adenopathy.   Psychiatric/Behavioral: Negative for confusion and hallucinations.        Objective   Objective     Vitals:   Temp:  [98 °F (36.7 °C)-98.8 °F (37.1 °C)] 98.8 °F (37.1 °C)  Heart Rate:  [64-92] 89  Resp:  [15-20] 16  BP: (127-165)/(70-97) 139/87    Physical Exam   Gen. well-developed appearing stated age in no acute distress  HEENT: Normocephalic atraumatic moist membranes pupils equal round reactive light, no scleral icterus no conjunctival injection  Cardiovascular: regular rate and rhythm no murmurs rubs or gallops S1-S2, trace bilateral upper and lower extremity edema appreciated  Pulmonary: Clear to auscultation bilaterally faint crackles bilateral posterior lung fields no wheezes or rhonchi symmetric chest expansion, unlabored, no conversational dyspnea appreciated  Gastrointestinal: Soft nontender nondistended positive bowel sounds all 4 quadrants no rebound or guarding  Musculoskeletal: No clubbing cyanosis, warm and well-perfused, calves soft symmetric nontender bilaterally  Skin: Clean dry, lacy rash over the superior back  Neuro: Cranial nerves II through XII intact grossly no sensorimotor deficits appreciated bilateral upper and lower extremities, hyperreflexive  Psych: Patient is calm cooperative and appropriate with exam not responding to internal stimuli  : No Manning catheter no bladder distention no suprapubic tenderness    Result Review    Result Review:  I have personally reviewed the results from the time of this admission to 4/15/2022 20:32 EDT and agree with these findings:  [x]  Laboratory  [x]  Microbiology  [x]  Radiology  []  EKG/Telemetry   []  Cardiology/Vascular   []  Pathology  [x]  Old records previous discharge summary from admission and delivery for preeclampsia this week.  []   Other:      Assessment/Plan   Assessment / Plan     Assessment/Plan:   Postpartum preeclampsia  Chest heaviness  Positional dyspnea  New pulmonary infiltrates bilaterally suspect volume related much more likely than infectious etiology  Hyponatremia  Mildly elevated transaminases  History of mild intermittent asthma that acute exacerbation    Patient admitted to OB/GYN   Continue treatment of preeclampsia with Procardia per OB/GYN primary  Consulted to evaluate chest heaviness and dyspnea with associated new pulmonary infiltrates  Patient remains afebrile cough is nonproductive white blood cell count within normal limits without a shift, Pro-Jaren within normal limits very elevation in CRP could be explained by recent  make bacterial pneumonia very unlikely  Patient has recently tested negative for influenza as well as COVID-19 and has atypical physical exam and laboratory findings with these infections  Would not recommend antibiotics or antivirals at this time.  No wheezing on exam appreciated no indication asthma exacerbation.  If patient were to develop productive cough, fever or worsening signs of infection could consider empiric antibiotics for community-acquired pneumonia.  Suspect patient's chest heaviness and positional dyspnea associated with generous volume status   Repeat Lasix 20mg IV x1 and monitor symptoms and diuresis. Discussed with Dr. Diaz covering for Dr. Lawson.  Repeat CMP and mag in a.m.  Thank you for this very interesting consult and opportunity to take part in the care of this patient. We will continue to follow.  Please feel free to call with any questions or concerns.      Discussed with the patient's nurse at bedside.  Discussed with Dr. Lawson as well as Dr. Diaz.  Further inpatient recommendations pending clinical course.    DVT prophylaxis:  Medical and mechanical DVT prophylaxis orders are present.    CODE STATUS:    Level Of Support Discussed With: Patient  Code  Status (Patient has no pulse and is not breathing): CPR (Attempt to Resuscitate)  Medical Interventions (Patient has pulse or is breathing): Full Support

## 2022-04-16 NOTE — PLAN OF CARE
Problem: Adult Inpatient Plan of Care  Goal: Plan of Care Review  Outcome: Ongoing, Progressing  Goal: Patient-Specific Goal (Individualized)  Outcome: Ongoing, Progressing  Goal: Absence of Hospital-Acquired Illness or Injury  Outcome: Ongoing, Progressing  Intervention: Identify and Manage Fall Risk  Intervention: Prevent and Manage VTE (Venous Thromboembolism) Risk  Intervention: Prevent Infection  Goal: Optimal Comfort and Wellbeing  Outcome: Ongoing, Progressing  Goal: Readiness for Transition of Care  Outcome: Ongoing, Progressing     Problem: Fall Injury Risk  Goal: Absence of Fall and Fall-Related Injury  Outcome: Ongoing, Progressing  Intervention: Identify and Manage Contributors  Intervention: Promote Injury-Free Environment     Problem: Breastfeeding  Goal: Effective Breastfeeding  Outcome: Ongoing, Progressing  Intervention: Support Exclusive Breastfeeding Success     Problem: Hypertensive Disorders in Pregnancy  Goal: Maternal-Fetal Stabilization  Outcome: Ongoing, Progressing  Intervention: Monitor and Manage Symptom Progression     Problem: Adult Inpatient Plan of Care  Goal: Plan of Care Review  Outcome: Ongoing, Progressing  Goal: Patient-Specific Goal (Individualized)  Outcome: Ongoing, Progressing  Goal: Absence of Hospital-Acquired Illness or Injury  Outcome: Ongoing, Progressing  Intervention: Identify and Manage Fall Risk  Intervention: Prevent and Manage VTE (Venous Thromboembolism) Risk  Intervention: Prevent Infection  Goal: Optimal Comfort and Wellbeing  Outcome: Ongoing, Progressing  Goal: Readiness for Transition of Care  Outcome: Ongoing, Progressing     Problem: Fall Injury Risk  Goal: Absence of Fall and Fall-Related Injury  Outcome: Ongoing, Progressing  Intervention: Identify and Manage Contributors  Intervention: Promote Injury-Free Environment     Problem: Breastfeeding  Goal: Effective Breastfeeding  Outcome: Ongoing, Progressing  Intervention: Support Exclusive Breastfeeding  Success     Problem: Hypertensive Disorders in Pregnancy  Goal: Maternal-Fetal Stabilization  Outcome: Ongoing, Progressing  Intervention: Monitor and Manage Symptom Progression   Goal Outcome Evaluation:

## 2022-04-16 NOTE — PROGRESS NOTES
[facility]   Hospitalist Progress Note  Date: 2022  Patient Name: Ailyn Bedoya  : 1990  MRN: [id number]  Date of admission: 2022      Subjective   Subjective     Chief Complaint: Chest pressure infiltrate concern for pneumonia    Summary: Ailyn Bedoya is a 31 y.o. female past medical history significant for IBS, mild intermittent asthma recently underwent  at 35 and 5 due to preeclampsia presents postpartum day 3 complaining of chest heaviness.  Has associated shortness of breath nonproductive cough and flushing.  Patient denies fever denies chills denies sick contacts.  Blood pressure found to be elevated protein creatinine ratio found to be elevated.  Patient admitted by Dr. Lawson diagnosed with postpartum preeclampsia given IV Lasix x1 as well as Procardia and magnesium.  Chest x-ray on admission demonstrated focal opacity within the right midlung.  Patient remains intermittently hypertensive.  Patient is afebrile heart rate within normal limits.  Satting well on room air.  Patient reports she is still having chest heaviness some shortness of breath with lying down improved with sitting up.  Patient endorses increased swelling bilateral extremities and hands.  Patient continues to endorse nonproductive cough.  BNP within normal limits.  Serum sodium slightly low.  ALT AST mildly elevated.  Serum potassium within normal limits.  CRP very slightly elevated.  Procalcitonin within normal limits.  Hemoglobin trending down very slightly.  White blood cell count within normal limits.  COVID not detected by PCR.  Repeat chest x-ray personally reviewed demonstrated a right-sided infiltrate with new left-sided infiltrate.  No effusion appreciated.  Protein creatinine ratio improved.  Patient given repeat dose of Lasix 20 mg IV.  Patient had profound diuresis with improvement in symptoms. Patient youngest child remains admitted to the NICU.  Patient is pumping while admitted.  No other  issues per nursing.    Interval Followup: Patient sitting up in bed resting comfortably.  Patient states that her shortness of breath chest heaviness is improved still has nonproductive cough occasionally.  Patient states that she was able to lie flat for some time overnight without any chest heaviness or discomfort.  Patient still endorsing significant bilateral lower extremity swelling.  Patient denies any new issues.  Afebrile overnight.  Heart rate within normal limits.  Blood pressure still 140s to 150s systolic.  Satting well on room air.  Serum sodium trending up this morning.  Serum potassium within normal limits.  ALT AST stable.    Review of Systems   Constitutional: Negative for fatigue and fever.   HENT: Negative for sore throat and trouble swallowing.    Eyes: Negative for pain and discharge.   Respiratory: Positive for cough. Negative for shortness of breath.    Cardiovascular: Positive for leg swelling. Negative for chest pain and palpitations.   Gastrointestinal: Negative for abdominal pain, nausea and vomiting.   Endocrine: Negative for cold intolerance and heat intolerance.   Genitourinary: Negative for difficulty urinating and dysuria.   Musculoskeletal: Negative for back pain and neck stiffness.   Skin: Negative for color change and rash.   Neurological: Negative for syncope and headaches.   Hematological: Negative for adenopathy.   Psychiatric/Behavioral: Negative for confusion and hallucinations.       Objective   Objective     Vitals:   Temp:  [98.2 °F (36.8 °C)-98.78 °F (37.1 °C)] 98.2 °F (36.8 °C)  Heart Rate:  [59-67] 66  Resp:  [18] 18  BP: (141-151)/(89-97) 145/89  Physical Exam   Gen. well-developed appearing stated age in no acute distress  HEENT: Normocephalic atraumatic moist membranes pupils equal round reactive light, no scleral icterus no conjunctival injection  Cardiovascular: regular rate and rhythm no murmurs rubs or gallops S1-S2, 3+ bilateral lower extremity edema  appreciated  Pulmonary: Clear to auscultation bilaterally no wheezes rales or rhonchi symmetric chest expansion, unlabored, no conversational dyspnea appreciated  Gastrointestinal: Soft nontender nondistended positive bowel sounds all 4 quadrants no rebound or guarding  Musculoskeletal: No clubbing cyanosis, warm and well-perfused, calves soft symmetric nontender bilaterally  Skin: Clean dry  Neuro: Cranial nerves II through XII intact grossly no sensorimotor deficits appreciated bilateral upper and lower extremities  Psych: Patient is calm cooperative and appropriate with exam not responding to internal stimuli  : No Manning catheter no bladder distention no suprapubic tenderness    Result Review    Result Review:  I have personally reviewed the results from the time of this admission to 2022 17:37 EDT and agree with these findings:  [x]  Laboratory  [x]  Microbiology  [x]  Radiology  []  EKG/Telemetry   []  Cardiology/Vascular   []  Pathology  []  Old records  []  Other:    Assessment/Plan   Assessment / Plan     Assessment/Plan:  Postpartum preeclampsia  Chest heaviness  Positional dyspnea  New pulmonary infiltrates bilaterally suspect volume related pulmonary edema much more likely than infectious etiology  Hyponatremia improved  Mildly elevated transaminases stable  History of mild intermittent asthma that acute exacerbation     Patient admitted to OB/GYN   Continue treatment of preeclampsia with Procardia titrated per OB/GYN primary  Consulted to evaluate chest heaviness and dyspnea with associated new pulmonary infiltrates  Patient remains afebrile cough is nonproductive white blood cell count within normal limits without a shift, Pro-Jaren within normal limits very elevation in CRP could be explained by recent  make bacterial pneumonia very unlikely  Patient has recently tested negative for influenza as well as COVID-19 and has atypical physical exam and laboratory findings with these  infections  No indication for antibiotics or antivirals at this time  No wheezing on exam appreciated no indication of asthma exacerbation.  If patient were to develop productive cough, fever or worsening signs of infection could consider empiric antibiotics for community-acquired pneumonia.  Suspect patient's chest heaviness and positional dyspnea associated with generous volume status   Would hold of on further diuresis unless pulmonary symptoms recur. Discussed with Dr. Diaz covering for Dr. Lawson.       Discussed with the patient's nurse at bedside.  Discussed with  Dr. Diaz.     Thank you for this very interesting consult and opportunity to take part in the care of this patient. We will sign off for now.  Please feel free to call us with any questions or if new symptoms present         DVT prophylaxis:  Medical and mechanical DVT prophylaxis orders are present.    CODE STATUS:   Level Of Support Discussed With: Patient  Code Status (Patient has no pulse and is not breathing): CPR (Attempt to Resuscitate)  Medical Interventions (Patient has pulse or is breathing): Full Support        Electronically signed by Suresh Baker MD, 04/16/22, 5:37 PM EDT.

## 2022-04-16 NOTE — PROGRESS NOTES
[facility]  Obstetric Progress Note    Hospital day #2, postoperative day 5 after  delivery, readmission with preeclampsia.  Blood pressures had been elevated.  Currently on nifedipine XL 30 twice daily  Diuresed 8 L in 24 hours.  On Lovenox for VTE prophylaxis    Subjective   Patient has no complaints   No headache.  No shortness of air today  Consult by medicine indicates not likely community-acquired, bacterial infiltrate.  Likely fluid overload, has responded to Lasix x2    Objective     Vital Signs Range for the last 24 hours  Temp:  [98.24 °F (36.8 °C)-98.8 °F (37.1 °C)] 98.4 °F (36.9 °C)   BP: (139-156)/(87-97) 145/97   Heart Rate:  [59-89] 67               Physical Exam    Constitutional: A&O x 4    Psychiatric: Appropriate affect, cooperative   Fundus: appropriate, firm, non tender   Dry without erythema    Rh Status:    RH type   Date Value Ref Range Status   2022 Positive  Final       Assessment/Plan       postpartum day 5, hospital day 2    Continue hospitalization due to elevated blood pressures.  Expect improvement by increasing nifedipine to twice daily.  Baby currently in NICU.  Baby was never discharged.     , Low Transverse         Preeclampsia in postpartum period    H/O:     Infiltrate noted on imaging study      Plan:  Continue postpartum care      Micha Diaz Sr., MD  2022  10:58 EDT

## 2022-04-16 NOTE — PLAN OF CARE
Problem: Adult Inpatient Plan of Care  Goal: Plan of Care Review  Outcome: Ongoing, Progressing  Flowsheets  Taken 4/16/2022 0645 by Naz Hatch RN  Plan of Care Reviewed With: patient  Taken 4/15/2022 1737 by Arely Brewer RN  Progress: improving  Goal: Patient-Specific Goal (Individualized)  Outcome: Ongoing, Progressing  Goal: Absence of Hospital-Acquired Illness or Injury  Outcome: Ongoing, Progressing  Intervention: Identify and Manage Fall Risk  Recent Flowsheet Documentation  Taken 4/15/2022 1954 by Naz Hatch RN  Safety Promotion/Fall Prevention: safety round/check completed  Goal: Optimal Comfort and Wellbeing  Outcome: Ongoing, Progressing  Goal: Readiness for Transition of Care  Outcome: Ongoing, Progressing     Problem: Fall Injury Risk  Goal: Absence of Fall and Fall-Related Injury  Outcome: Ongoing, Progressing  Intervention: Promote Injury-Free Environment  Recent Flowsheet Documentation  Taken 4/15/2022 1954 by Naz Hatch RN  Safety Promotion/Fall Prevention: safety round/check completed     Problem: Breastfeeding  Goal: Effective Breastfeeding  Outcome: Ongoing, Progressing     Problem: Hypertensive Disorders in Pregnancy  Goal: Maternal-Fetal Stabilization  Outcome: Ongoing, Progressing   Goal Outcome Evaluation:  Plan of Care Reviewed With: patient

## 2022-04-17 VITALS
OXYGEN SATURATION: 99 % | DIASTOLIC BLOOD PRESSURE: 78 MMHG | BODY MASS INDEX: 28.06 KG/M2 | HEIGHT: 69 IN | RESPIRATION RATE: 18 BRPM | HEART RATE: 87 BPM | SYSTOLIC BLOOD PRESSURE: 128 MMHG | TEMPERATURE: 98.2 F

## 2022-04-17 PROCEDURE — 99217 PR OBSERVATION CARE DISCHARGE MANAGEMENT: CPT | Performed by: OBSTETRICS & GYNECOLOGY

## 2022-04-17 RX ORDER — NIFEDIPINE 30 MG/1
30 TABLET, EXTENDED RELEASE ORAL 2 TIMES DAILY
Qty: 60 TABLET | Refills: 2 | Status: SHIPPED | OUTPATIENT
Start: 2022-04-17 | End: 2022-07-13

## 2022-04-17 RX ADMIN — NIFEDIPINE 30 MG: 30 TABLET, FILM COATED, EXTENDED RELEASE ORAL at 08:19

## 2022-04-17 RX ADMIN — VITAMIN A, VITAMIN C, VITAMIN D, VITAMIN E, THIAMINE, RIBOFLAVIN, NIACIN, VITAMIN B6, FOLIC ACID, VITAMIN B12, CALCIUM, IRON, ZINC, COPPER 1 TABLET: 4000; 120; 400; 22; 1.84; 3; 20; 10; 1; 12; 200; 27; 25; 2 TABLET ORAL at 08:19

## 2022-04-17 NOTE — PLAN OF CARE
Problem: Adult Inpatient Plan of Care  Goal: Plan of Care Review  Outcome: Met  Goal: Patient-Specific Goal (Individualized)  Outcome: Met  Goal: Absence of Hospital-Acquired Illness or Injury  Outcome: Met  Intervention: Identify and Manage Fall Risk  Recent Flowsheet Documentation  Taken 4/17/2022 0800 by Magda Randall RN  Safety Promotion/Fall Prevention: safety round/check completed  Intervention: Prevent and Manage VTE (Venous Thromboembolism) Risk  Recent Flowsheet Documentation  Taken 4/17/2022 0800 by Magda Randall RN  Activity Management: up ad gill  Intervention: Prevent Infection  Recent Flowsheet Documentation  Taken 4/17/2022 0800 by Magda Randall RN  Infection Prevention: hand hygiene promoted  Goal: Optimal Comfort and Wellbeing  Outcome: Met  Intervention: Provide Person-Centered Care  Recent Flowsheet Documentation  Taken 4/17/2022 0800 by Magda Randall RN  Trust Relationship/Rapport:   care explained   choices provided   emotional support provided   questions answered   questions encouraged  Goal: Readiness for Transition of Care  Outcome: Met  Intervention: Mutually Develop Transition Plan  Recent Flowsheet Documentation  Taken 4/17/2022 1120 by Magda Randall RN  Equipment Currently Used at Home: none  Transportation Anticipated: car, drives self  Patient/Family Anticipated Services at Transition: none  Patient/Family Anticipates Transition to: home     Problem: Fall Injury Risk  Goal: Absence of Fall and Fall-Related Injury  Outcome: Met  Intervention: Promote Injury-Free Environment  Recent Flowsheet Documentation  Taken 4/17/2022 0800 by Magda Randall RN  Safety Promotion/Fall Prevention: safety round/check completed     Problem: Breastfeeding  Goal: Effective Breastfeeding  Outcome: Met     Problem: Hypertensive Disorders in Pregnancy  Goal: Maternal-Fetal Stabilization  Outcome: Met   Goal Outcome Evaluation:

## 2022-04-17 NOTE — PROGRESS NOTES
[facility]  Obstetric Progress Note    Hospital day 3, postoperative day 6 after  delivery  Hospitalized with preeclampsia    Subjective   Patient has no complaints   No headache.    Objective     Vital Signs Range for the last 24 hours  Temp:  [97.6 °F (36.4 °C)-98.2 °F (36.8 °C)] 98.2 °F (36.8 °C)   BP: (128-158)/() 128/78   Heart Rate:  [63-87] 87         Blood pressure much improved with nifedipine XL 30 mg twice daily      Physical Exam    Constitutional: A&O x 4    Psychiatric: Appropriate affect, cooperative   Fundus: appropriate, firm, non tender   Dry without erythema    Rh Status:    RH type   Date Value Ref Range Status   2022 Positive  Final       Assessment/Plan       Postpartum Day 6, hospital day 3.       , Low Transverse         Preeclampsia in postpartum period    H/O:     Infiltrate noted on imaging study      Plan:  Discharge home  Counseled patient to wean blood pressure medicine to keep pressure below 135/85  Follow-up in the office in 1 to 2 weeks approximately    Micha Diaz Sr., MD  2022  12:04 EDT

## 2022-04-18 PROBLEM — Z34.90 PREGNANCY: Status: RESOLVED | Noted: 2022-04-11 | Resolved: 2022-04-18

## 2022-04-18 NOTE — PROGRESS NOTES
Post Delivery EARLY Follow up (1-4weeks)      CC: BP check, taking Procardia meds 30XL BID  Chief Complaint   Patient presents with   • PP/BP Check     Date of delivery: 2022  Delivery type:    Perineum: NA  Feeding: Breast and bottle    HPI:     HA:  None now, can get occas HA goes away w motrin or w hydration/rest  Vision changes:  No scotomata.  Yesterday while walking saw some spots.  Went away w resting.  RUQ/epigastric pain:  No  Swelling:  Yes, much improved  Pain:  No  Vaginal Bleeding:  Very little  Depressed/Anxious:  No  Weight at last OB OV: 190       Discharge Summary by Micha Diaz Sr., MD (04/15/2022 07:16)     BP this am at home before procardia, systolic 120's, diastolics 80-90's.     PHYSICAL EXAM:  /78   Wt 79.4 kg (175 lb)   LMP 2021   BMI 25.84 kg/m²  17.7 kg (39 lb)  General- NAD, alert and oriented, appropriate  Psych- Normal mood, good memory, good eye contact  INCISION: Clean, dry, intact, no evidence of infection  Abdomen- Soft, non distended, non tender, no masses    Ext- +1 edema, bilaterally equal, no signs of DVT, DTR patella +1, DTR achilles NO clonus  Skin- No lesions, no rashes    ASSESSMENT AND PLAN:  Diagnoses and all orders for this visit:    1. ho Severe pre-eclampsia in third trimester, readmit for mag prophylaxis (Primary)    2. H/O:     Keep procardia BID, ok to wean to Qday prn dropping in BP at home    Counseling:    • Return to office for HA unrelieved w rest/hydration/OTC meds, vision changes, increase in edema, RUQ/epigastric pain, any concerns.  • Check BPs at home.  Return to office for systolic >155 OR diastolic >105.  • Postpartum/Postop  precautions reviewed.    Follow Up:  Return in about 2 weeks (around 5/3/2022) for BP check.    I spent 20 minutes caring for Ailyn on this date of service. This time includes time spent by me in the following activities:preparing for the visit, reviewing tests, obtaining and/or  reviewing a separately obtained history, performing a medically appropriate examination and/or evaluation , counseling and educating the patient/family/caregiver and documenting information in the medical record      Beata Castillo DO  04/19/2022    [facility]  [facility] [place] [address]  Dept: [phone]  Dept Fax: [phone]  Loc: [phone]  Loc Fax: [phone]

## 2022-04-19 ENCOUNTER — POSTPARTUM VISIT (OUTPATIENT)
Dept: OBSTETRICS AND GYNECOLOGY | Facility: CLINIC | Age: 32
End: 2022-04-19

## 2022-04-19 VITALS — BODY MASS INDEX: 25.84 KG/M2 | WEIGHT: 175 LBS | SYSTOLIC BLOOD PRESSURE: 128 MMHG | DIASTOLIC BLOOD PRESSURE: 78 MMHG

## 2022-04-19 DIAGNOSIS — O14.13 SEVERE PRE-ECLAMPSIA IN THIRD TRIMESTER: Primary | ICD-10-CM

## 2022-04-19 DIAGNOSIS — Z98.891 H/O: C-SECTION: ICD-10-CM

## 2022-04-19 PROCEDURE — 0503F POSTPARTUM CARE VISIT: CPT | Performed by: OBSTETRICS & GYNECOLOGY

## 2022-04-27 ENCOUNTER — TELEPHONE (OUTPATIENT)
Dept: LACTATION | Facility: HOSPITAL | Age: 32
End: 2022-04-27

## 2022-05-01 PROBLEM — O14.13 SEVERE PRE-ECLAMPSIA IN THIRD TRIMESTER: Status: RESOLVED | Noted: 2022-04-11 | Resolved: 2022-05-01

## 2022-05-01 PROBLEM — R93.89 INFILTRATE NOTED ON IMAGING STUDY: Status: RESOLVED | Noted: 2022-04-15 | Resolved: 2022-05-01

## 2022-05-02 ENCOUNTER — POSTPARTUM VISIT (OUTPATIENT)
Dept: OBSTETRICS AND GYNECOLOGY | Facility: CLINIC | Age: 32
End: 2022-05-02

## 2022-05-02 VITALS
BODY MASS INDEX: 25.1 KG/M2 | HEART RATE: 82 BPM | DIASTOLIC BLOOD PRESSURE: 77 MMHG | WEIGHT: 170 LBS | SYSTOLIC BLOOD PRESSURE: 113 MMHG

## 2022-05-02 DIAGNOSIS — Z98.891 H/O: C-SECTION: ICD-10-CM

## 2022-05-02 DIAGNOSIS — R53.83 FATIGUE, UNSPECIFIED TYPE: ICD-10-CM

## 2022-05-02 DIAGNOSIS — R42 DIZZINESS: ICD-10-CM

## 2022-05-02 LAB
25(OH)D3 SERPL-MCNC: 22.9 NG/ML (ref 30–100)
BASOPHILS # BLD AUTO: 0.07 10*3/MM3 (ref 0–0.2)
BASOPHILS NFR BLD AUTO: 1.1 % (ref 0–1.5)
DEPRECATED RDW RBC AUTO: 43.6 FL (ref 37–54)
EOSINOPHIL # BLD AUTO: 0.18 10*3/MM3 (ref 0–0.4)
EOSINOPHIL NFR BLD AUTO: 2.9 % (ref 0.3–6.2)
ERYTHROCYTE [DISTWIDTH] IN BLOOD BY AUTOMATED COUNT: 13.4 % (ref 12.3–15.4)
HCT VFR BLD AUTO: 39.8 % (ref 34–46.6)
HGB BLD-MCNC: 12.5 G/DL (ref 12–15.9)
IMM GRANULOCYTES # BLD AUTO: 0.01 10*3/MM3 (ref 0–0.05)
IMM GRANULOCYTES NFR BLD AUTO: 0.2 % (ref 0–0.5)
LYMPHOCYTES # BLD AUTO: 2.25 10*3/MM3 (ref 0.7–3.1)
LYMPHOCYTES NFR BLD AUTO: 35.9 % (ref 19.6–45.3)
MCH RBC QN AUTO: 27.7 PG (ref 26.6–33)
MCHC RBC AUTO-ENTMCNC: 31.4 G/DL (ref 31.5–35.7)
MCV RBC AUTO: 88.1 FL (ref 79–97)
MONOCYTES # BLD AUTO: 0.41 10*3/MM3 (ref 0.1–0.9)
MONOCYTES NFR BLD AUTO: 6.5 % (ref 5–12)
NEUTROPHILS NFR BLD AUTO: 3.35 10*3/MM3 (ref 1.7–7)
NEUTROPHILS NFR BLD AUTO: 53.4 % (ref 42.7–76)
NRBC BLD AUTO-RTO: 0.2 /100 WBC (ref 0–0.2)
PLATELET # BLD AUTO: 353 10*3/MM3 (ref 140–450)
PMV BLD AUTO: 9.9 FL (ref 6–12)
RBC # BLD AUTO: 4.52 10*6/MM3 (ref 3.77–5.28)
T4 FREE SERPL-MCNC: 1.13 NG/DL (ref 0.93–1.7)
TSH SERPL DL<=0.05 MIU/L-ACNC: 1.57 UIU/ML (ref 0.27–4.2)
WBC NRBC COR # BLD: 6.27 10*3/MM3 (ref 3.4–10.8)

## 2022-05-02 PROCEDURE — 82306 VITAMIN D 25 HYDROXY: CPT | Performed by: OBSTETRICS & GYNECOLOGY

## 2022-05-02 PROCEDURE — 84443 ASSAY THYROID STIM HORMONE: CPT | Performed by: OBSTETRICS & GYNECOLOGY

## 2022-05-02 PROCEDURE — 84439 ASSAY OF FREE THYROXINE: CPT | Performed by: OBSTETRICS & GYNECOLOGY

## 2022-05-02 PROCEDURE — 85025 COMPLETE CBC W/AUTO DIFF WBC: CPT | Performed by: OBSTETRICS & GYNECOLOGY

## 2022-05-02 PROCEDURE — 99214 OFFICE O/P EST MOD 30 MIN: CPT | Performed by: OBSTETRICS & GYNECOLOGY

## 2022-05-03 ENCOUNTER — TELEPHONE (OUTPATIENT)
Dept: OBSTETRICS AND GYNECOLOGY | Facility: CLINIC | Age: 32
End: 2022-05-03

## 2022-05-03 PROBLEM — E55.9 VITAMIN D DEFICIENCY: Status: ACTIVE | Noted: 2022-05-03

## 2022-05-03 RX ORDER — CHOLECALCIFEROL (VITAMIN D3) 50 MCG
2000 TABLET ORAL DAILY
Qty: 90 TABLET | Refills: 1 | Status: SHIPPED | OUTPATIENT
Start: 2022-05-03 | End: 2022-11-16

## 2022-05-03 NOTE — TELEPHONE ENCOUNTER
Left message asking pt to return my call, also sent a mychart message with results and letting patient know she has an RX @ her pharmacy

## 2022-05-03 NOTE — TELEPHONE ENCOUNTER
----- Message from Beata Castillo DO sent at 5/3/2022  9:43 AM EDT -----  Labs wnl except vit D low which can cause symptoms she was having  Rx sent to her pharmacy w 1RF

## 2022-05-16 PROBLEM — O09.899 MATERNAL VARICELLA, NON-IMMUNE: Status: ACTIVE | Noted: 2022-05-16

## 2022-05-16 PROBLEM — Z28.39 MATERNAL VARICELLA, NON-IMMUNE: Status: ACTIVE | Noted: 2022-05-16

## 2022-05-16 PROBLEM — Z98.891 H/O: C-SECTION: Status: RESOLVED | Noted: 2022-04-11 | Resolved: 2022-05-16

## 2022-05-17 ENCOUNTER — POSTPARTUM VISIT (OUTPATIENT)
Dept: OBSTETRICS AND GYNECOLOGY | Facility: CLINIC | Age: 32
End: 2022-05-17

## 2022-05-17 VITALS
SYSTOLIC BLOOD PRESSURE: 110 MMHG | HEART RATE: 74 BPM | WEIGHT: 166 LBS | DIASTOLIC BLOOD PRESSURE: 68 MMHG | BODY MASS INDEX: 24.51 KG/M2

## 2022-05-17 DIAGNOSIS — Z30.09 BIRTH CONTROL COUNSELING: ICD-10-CM

## 2022-05-17 DIAGNOSIS — E55.9 VITAMIN D DEFICIENCY: ICD-10-CM

## 2022-05-17 DIAGNOSIS — Z28.39 MATERNAL VARICELLA, NON-IMMUNE: ICD-10-CM

## 2022-05-17 DIAGNOSIS — O09.899 MATERNAL VARICELLA, NON-IMMUNE: ICD-10-CM

## 2022-05-17 PROCEDURE — 0503F POSTPARTUM CARE VISIT: CPT | Performed by: OBSTETRICS & GYNECOLOGY

## 2022-06-20 ENCOUNTER — TELEPHONE (OUTPATIENT)
Dept: OBSTETRICS AND GYNECOLOGY | Facility: CLINIC | Age: 32
End: 2022-06-20

## 2022-06-20 RX ORDER — ACETAMINOPHEN AND CODEINE PHOSPHATE 120; 12 MG/5ML; MG/5ML
1 SOLUTION ORAL DAILY
Qty: 90 TABLET | Refills: 4 | Status: SHIPPED | OUTPATIENT
Start: 2022-06-20 | End: 2022-11-16

## 2022-06-20 NOTE — TELEPHONE ENCOUNTER
Patient called this am.  She states you discuss birth control @ her last visit.  She is requesting an Rx for birth control pills. Last seen on 5-17-22.  Next appointment 5-23-23.

## 2022-07-13 ENCOUNTER — OFFICE VISIT (OUTPATIENT)
Dept: FAMILY MEDICINE CLINIC | Facility: CLINIC | Age: 32
End: 2022-07-13

## 2022-07-13 ENCOUNTER — TELEPHONE (OUTPATIENT)
Dept: FAMILY MEDICINE CLINIC | Facility: CLINIC | Age: 32
End: 2022-07-13

## 2022-07-13 ENCOUNTER — LAB (OUTPATIENT)
Dept: LAB | Facility: HOSPITAL | Age: 32
End: 2022-07-13

## 2022-07-13 VITALS
HEART RATE: 89 BPM | OXYGEN SATURATION: 98 % | HEIGHT: 70 IN | TEMPERATURE: 97.1 F | SYSTOLIC BLOOD PRESSURE: 118 MMHG | BODY MASS INDEX: 24.08 KG/M2 | DIASTOLIC BLOOD PRESSURE: 64 MMHG | WEIGHT: 168.2 LBS

## 2022-07-13 DIAGNOSIS — E55.9 VITAMIN D DEFICIENCY: ICD-10-CM

## 2022-07-13 DIAGNOSIS — Z00.00 WELL ADULT EXAM: ICD-10-CM

## 2022-07-13 DIAGNOSIS — E55.9 VITAMIN D DEFICIENCY: Primary | ICD-10-CM

## 2022-07-13 DIAGNOSIS — K64.9 HEMORRHOIDS, UNSPECIFIED HEMORRHOID TYPE: ICD-10-CM

## 2022-07-13 DIAGNOSIS — Z13.220 SCREENING FOR LIPID DISORDERS: ICD-10-CM

## 2022-07-13 LAB
25(OH)D3 SERPL-MCNC: 31.7 NG/ML (ref 30–100)
ALBUMIN SERPL-MCNC: 4.3 G/DL (ref 3.5–5.2)
ALBUMIN/GLOB SERPL: 2.3 G/DL
ALP SERPL-CCNC: 64 U/L (ref 39–117)
ALT SERPL W P-5'-P-CCNC: 15 U/L (ref 1–33)
ANION GAP SERPL CALCULATED.3IONS-SCNC: 10.4 MMOL/L (ref 5–15)
AST SERPL-CCNC: 19 U/L (ref 1–32)
BILIRUB SERPL-MCNC: 0.3 MG/DL (ref 0–1.2)
BUN SERPL-MCNC: 11 MG/DL (ref 6–20)
BUN/CREAT SERPL: 13.3 (ref 7–25)
CALCIUM SPEC-SCNC: 9.1 MG/DL (ref 8.6–10.5)
CHLORIDE SERPL-SCNC: 103 MMOL/L (ref 98–107)
CHOLEST SERPL-MCNC: 185 MG/DL (ref 0–200)
CO2 SERPL-SCNC: 25.6 MMOL/L (ref 22–29)
CREAT SERPL-MCNC: 0.83 MG/DL (ref 0.57–1)
DEPRECATED RDW RBC AUTO: 45.2 FL (ref 37–54)
EGFRCR SERPLBLD CKD-EPI 2021: 97.4 ML/MIN/1.73
ERYTHROCYTE [DISTWIDTH] IN BLOOD BY AUTOMATED COUNT: 14 % (ref 12.3–15.4)
GLOBULIN UR ELPH-MCNC: 1.9 GM/DL
GLUCOSE SERPL-MCNC: 87 MG/DL (ref 65–99)
HCT VFR BLD AUTO: 37.9 % (ref 34–46.6)
HDLC SERPL-MCNC: 46 MG/DL (ref 40–60)
HGB BLD-MCNC: 12.2 G/DL (ref 12–15.9)
LDLC SERPL CALC-MCNC: 126 MG/DL (ref 0–100)
LDLC/HDLC SERPL: 2.71 {RATIO}
MCH RBC QN AUTO: 28.3 PG (ref 26.6–33)
MCHC RBC AUTO-ENTMCNC: 32.2 G/DL (ref 31.5–35.7)
MCV RBC AUTO: 87.9 FL (ref 79–97)
PLATELET # BLD AUTO: 283 10*3/MM3 (ref 140–450)
PMV BLD AUTO: 9.8 FL (ref 6–12)
POTASSIUM SERPL-SCNC: 4 MMOL/L (ref 3.5–5.2)
PROT SERPL-MCNC: 6.2 G/DL (ref 6–8.5)
RBC # BLD AUTO: 4.31 10*6/MM3 (ref 3.77–5.28)
SODIUM SERPL-SCNC: 139 MMOL/L (ref 136–145)
TRIGL SERPL-MCNC: 72 MG/DL (ref 0–150)
VLDLC SERPL-MCNC: 13 MG/DL (ref 5–40)
WBC NRBC COR # BLD: 5.94 10*3/MM3 (ref 3.4–10.8)

## 2022-07-13 PROCEDURE — 36415 COLL VENOUS BLD VENIPUNCTURE: CPT

## 2022-07-13 PROCEDURE — 80053 COMPREHEN METABOLIC PANEL: CPT

## 2022-07-13 PROCEDURE — 99395 PREV VISIT EST AGE 18-39: CPT | Performed by: NURSE PRACTITIONER

## 2022-07-13 PROCEDURE — 85027 COMPLETE CBC AUTOMATED: CPT

## 2022-07-13 PROCEDURE — 82306 VITAMIN D 25 HYDROXY: CPT

## 2022-07-13 PROCEDURE — 80061 LIPID PANEL: CPT

## 2022-07-13 RX ORDER — HYDROCORTISONE ACETATE 25 MG/1
25 SUPPOSITORY RECTAL 2 TIMES DAILY
Qty: 20 SUPPOSITORY | Refills: 1 | Status: SHIPPED | OUTPATIENT
Start: 2022-07-13 | End: 2022-11-16

## 2022-07-13 NOTE — PROGRESS NOTES
"Chief Complaint  Annual Exam    Subjective        Aliyn Bedoya presents to [facility]  Patient presents to the office today for her annual wellness checkup.  She states that she is doing well although she has been suffering with hemorrhoids since the birth of her child.  She does state that she had a vitamin D deficiency has been taking supplements.  I explained that we would recheck her labs to ensure things were returning to baseline.  Pressure is 118/64.  She did have a history of preeclampsia with her pregnancy.  Patient was also checked for her varicella titers which were negative.  Patient does state that she wishes to get the varicella vaccination.  I explained that we do not keep this in the office and she can get that at her pharmacy      Objective   Vital Signs:  /64 (BP Location: Right arm, Patient Position: Sitting, Cuff Size: Adult)   Pulse 89   Temp 97.1 °F (36.2 °C) (Temporal)   Ht 176.5 cm (69.5\")   Wt 76.3 kg (168 lb 3.2 oz)   SpO2 98%   BMI 24.48 kg/m²   Estimated body mass index is 24.48 kg/m² as calculated from the following:    Height as of this encounter: 176.5 cm (69.5\").    Weight as of this encounter: 76.3 kg (168 lb 3.2 oz).    BMI is within normal parameters. No other follow-up for BMI required.      Physical Exam  Vitals reviewed.   Constitutional:       Appearance: Normal appearance.   HENT:      Right Ear: Tympanic membrane, ear canal and external ear normal.      Left Ear: Tympanic membrane, ear canal and external ear normal.      Mouth/Throat:      Mouth: Mucous membranes are moist.      Pharynx: Oropharynx is clear.   Eyes:      Extraocular Movements: Extraocular movements intact.      Conjunctiva/sclera: Conjunctivae normal.      Pupils: Pupils are equal, round, and reactive to light.   Cardiovascular:      Rate and Rhythm: Normal rate and regular rhythm.      Pulses: Normal pulses.      Heart sounds: Normal heart sounds, S1 normal and " S2 normal. No murmur heard.  Pulmonary:      Effort: Pulmonary effort is normal. No respiratory distress.      Breath sounds: Normal breath sounds.   Abdominal:      General: Abdomen is flat. Bowel sounds are normal.      Palpations: Abdomen is soft.   Musculoskeletal:         General: Normal range of motion.      Cervical back: Normal range of motion and neck supple.   Skin:     General: Skin is warm and dry.   Neurological:      Mental Status: She is alert and oriented to person, place, and time.   Psychiatric:         Attention and Perception: Attention normal.         Mood and Affect: Mood normal.         Behavior: Behavior normal.        Result Review :               Assessment and Plan   Diagnoses and all orders for this visit:    1. Vitamin D deficiency (Primary)  -     Vitamin D 25 hydroxy; Future    2. Screening for lipid disorders  -     Lipid Panel; Future    3. Well adult exam  -     Comprehensive metabolic panel; Future  -     CBC No Differential; Future  -     Lipid Panel; Future    4. Hemorrhoids, unspecified hemorrhoid type  -     hydrocortisone (ANUSOL-HC) 25 MG suppository; Insert 1 suppository into the rectum 2 (Two) Times a Day.  Dispense: 20 suppository; Refill: 1             Follow Up   Return in about 1 year (around 7/13/2023) for Recheck.  Patient was given instructions and counseling regarding her condition or for health maintenance advice. Please see specific information pulled into the AVS if appropriate.

## 2022-07-13 NOTE — TELEPHONE ENCOUNTER
Caller: Ailyn Bedoya    Relationship: Self    Best call back number: [phone]    What was the call regarding: PT CALLED STATING THE PHARMACY TOLD HER THE MEDICATION THAT WAS CALLED IN FOR HER NEEDS A PA PER HER INSURANCE    Do you require a callback: YES

## 2022-10-20 ENCOUNTER — OFFICE VISIT (OUTPATIENT)
Dept: FAMILY MEDICINE CLINIC | Facility: CLINIC | Age: 32
End: 2022-10-20

## 2022-10-20 ENCOUNTER — TELEPHONE (OUTPATIENT)
Dept: FAMILY MEDICINE CLINIC | Facility: CLINIC | Age: 32
End: 2022-10-20

## 2022-10-20 VITALS
DIASTOLIC BLOOD PRESSURE: 70 MMHG | SYSTOLIC BLOOD PRESSURE: 120 MMHG | HEART RATE: 85 BPM | TEMPERATURE: 98 F | RESPIRATION RATE: 18 BRPM | OXYGEN SATURATION: 97 %

## 2022-10-20 DIAGNOSIS — R30.0 BURNING WITH URINATION: Primary | ICD-10-CM

## 2022-10-20 DIAGNOSIS — R31.0 GROSS HEMATURIA: ICD-10-CM

## 2022-10-20 DIAGNOSIS — R31.9 HEMATURIA, UNSPECIFIED TYPE: ICD-10-CM

## 2022-10-20 DIAGNOSIS — M54.50 ACUTE BILATERAL LOW BACK PAIN WITHOUT SCIATICA: ICD-10-CM

## 2022-10-20 LAB
BILIRUB BLD-MCNC: NEGATIVE MG/DL
CLARITY, POC: CLEAR
COLOR UR: YELLOW
EXPIRATION DATE: ABNORMAL
GLUCOSE UR STRIP-MCNC: NEGATIVE MG/DL
KETONES UR QL: NEGATIVE
LEUKOCYTE EST, POC: NEGATIVE
Lab: ABNORMAL
NITRITE UR-MCNC: NEGATIVE MG/ML
PH UR: 7 [PH] (ref 5–8)
PROT UR STRIP-MCNC: ABNORMAL MG/DL
RBC # UR STRIP: ABNORMAL /UL
SP GR UR: 1.03 (ref 1–1.03)
UROBILINOGEN UR QL: ABNORMAL

## 2022-10-20 PROCEDURE — 87086 URINE CULTURE/COLONY COUNT: CPT

## 2022-10-20 PROCEDURE — 99213 OFFICE O/P EST LOW 20 MIN: CPT

## 2022-10-20 PROCEDURE — 81003 URINALYSIS AUTO W/O SCOPE: CPT

## 2022-10-20 PROCEDURE — 96372 THER/PROPH/DIAG INJ SC/IM: CPT

## 2022-10-20 RX ORDER — KETOROLAC TROMETHAMINE 30 MG/ML
30 INJECTION, SOLUTION INTRAMUSCULAR; INTRAVENOUS EVERY 6 HOURS PRN
Status: DISCONTINUED | OUTPATIENT
Start: 2022-10-20 | End: 2022-10-20

## 2022-10-20 RX ORDER — CEFTRIAXONE 1 G/1
1 INJECTION, POWDER, FOR SOLUTION INTRAMUSCULAR; INTRAVENOUS ONCE
Status: COMPLETED | OUTPATIENT
Start: 2022-10-20 | End: 2022-10-20

## 2022-10-20 RX ORDER — KETOROLAC TROMETHAMINE 30 MG/ML
1 INJECTION, SOLUTION INTRAMUSCULAR; INTRAVENOUS ONCE
Status: COMPLETED | OUTPATIENT
Start: 2022-10-20 | End: 2022-10-20

## 2022-10-20 RX ADMIN — CEFTRIAXONE 1 G: 1 INJECTION, POWDER, FOR SOLUTION INTRAMUSCULAR; INTRAVENOUS at 09:08

## 2022-10-20 RX ADMIN — KETOROLAC TROMETHAMINE 0.9 MG: 30 INJECTION, SOLUTION INTRAMUSCULAR; INTRAVENOUS at 09:10

## 2022-10-20 NOTE — PROGRESS NOTES
Ailyn Bedoya presents to [facility] with complaints of persistent burning with urination, lower back pain, blood in urine, pelvic pain.      History of Present Illness  This is a 32-year-old female who presents to clinic with complaints of persistent burning with urination, lower back pain, and pelvic pain.    Patient states that her symptoms really started about 1 to 2 weeks ago, states that she went to visit clinic, where she had a urine culture done and was having symptoms of UTI, thus they treated her with Macrobid.  Patient states that her symptoms did not improve with this treatment, went back after she completed the full course of Macrobid, where they obtained another urine culture, patient is still not got the results of either of these urine cultures.  They placed her on Cipro to further treat the UTI.  Patient states that she has had persistent issues, still having blood in urine, also is having some pretty severe back pain.  Also is having some pelvic pain, had some mild fever and chills overnight, and thus is wondering what is going on as the antibiotics of not treating her symptoms.  She states that her symptoms are persistent, she is tried other things over-the-counter, not got a lot of relief out of those either.  Is unsure of what to do, and that she came in for evaluation.    The following portions of the patient's history were personally reviewed and updated as appropriate: allergies, current medications, past medical history, past surgical history, past family history, and past social history.       Objective   Vital Signs:   /70   Pulse 85   Temp 98 °F (36.7 °C)   Resp 18   SpO2 97%     There is no height or weight on file to calculate BMI.    All labs, imaging, test results, and specialty provider notes reviewed with patient.     Physical Exam  Vitals reviewed.   Constitutional:       Appearance: Normal appearance.   Cardiovascular:      Rate and  Rhythm: Normal rate and regular rhythm.      Pulses: Normal pulses.      Heart sounds: Normal heart sounds.   Pulmonary:      Effort: Pulmonary effort is normal.      Breath sounds: Normal breath sounds.   Abdominal:      Tenderness: There is right CVA tenderness and left CVA tenderness.   Neurological:      General: No focal deficit present.      Mental Status: She is alert and oriented to person, place, and time.              Assessment and Plan:  Diagnoses and all orders for this visit:    1. Burning with urination (Primary)  -     POCT urinalysis dipstick, automated  -     Urine Culture - Urine, Urine, Clean Catch  -     cefTRIAXone (ROCEPHIN) injection 1 g  -     Discontinue: ketorolac (TORADOL) injection 30 mg  -     ketorolac (TORADOL) injection 0.9 mg  -     CT Abdomen Pelvis Stone Protocol; Future    2. Hematuria, unspecified type  -     POCT urinalysis dipstick, automated  -     Urine Culture - Urine, Urine, Clean Catch  -     cefTRIAXone (ROCEPHIN) injection 1 g  -     Discontinue: ketorolac (TORADOL) injection 30 mg  -     ketorolac (TORADOL) injection 0.9 mg    3. Acute bilateral low back pain without sciatica  -     CT Abdomen Pelvis Stone Protocol; Future    4. Gross hematuria  -     CT Abdomen Pelvis Stone Protocol; Future      Due to 2 failed courses of antibiotics, do question whether patient could have pyelonephritis or even a kidney stone, so we will obtain a stat CT scan of the abdomen and pelvis to rule either 1 of these out.  We will go ahead and give a Rocephin injection today as well as a Toradol injection to help with the acute pain patient is currently experiencing.  Discussed with her further treatment will based off results of CT scan, can dictate what we need to do after we have evaluation with some imaging.  Verbalized understanding.  Did tell her to complete full course of ciprofloxacin.    Follow Up:  No follow-ups on file.    Patient was given instructions and counseling regarding  her condition or for health maintenance advice. Please see specific information pulled into the AVS if appropriate.

## 2022-10-20 NOTE — TELEPHONE ENCOUNTER
Patient was calling about the stat scan order about seeing if she had kidney stones. Patient would like a call back when the order is put in.

## 2022-10-21 ENCOUNTER — HOSPITAL ENCOUNTER (OUTPATIENT)
Dept: CT IMAGING | Facility: HOSPITAL | Age: 32
Discharge: HOME | End: 2022-10-21

## 2022-10-21 ENCOUNTER — APPOINTMENT (OUTPATIENT)
Dept: CT IMAGING | Facility: HOSPITAL | Age: 32
End: 2022-10-21

## 2022-10-21 ENCOUNTER — TELEPHONE (OUTPATIENT)
Dept: FAMILY MEDICINE CLINIC | Facility: CLINIC | Age: 32
End: 2022-10-21

## 2022-10-21 DIAGNOSIS — R31.0 GROSS HEMATURIA: ICD-10-CM

## 2022-10-21 DIAGNOSIS — R30.0 BURNING WITH URINATION: ICD-10-CM

## 2022-10-21 DIAGNOSIS — R10.9 FLANK PAIN: Primary | ICD-10-CM

## 2022-10-21 DIAGNOSIS — M54.50 ACUTE BILATERAL LOW BACK PAIN WITHOUT SCIATICA: ICD-10-CM

## 2022-10-21 LAB — BACTERIA SPEC AEROBE CULT: NO GROWTH

## 2022-10-21 PROCEDURE — 74176 CT ABD & PELVIS W/O CONTRAST: CPT

## 2022-10-21 NOTE — TELEPHONE ENCOUNTER
Caller: Ailyn Bedoya    Relationship: Self    Best call back number: [phone]    Caller requesting test results: PATIENT     What test was performed: CT SCAN    When was the test performed: YESTERDAY    Where was the test performed: [facility]    Additional notes: PATIENT WOULD LIKE TO KNOW HER CT RESULTS. SHE WOULD LIKE TO KNOW THE RESULTS TODAY IF POSSIBLE. PLEASE CALL PATIENT TO ADVISE.

## 2022-10-24 ENCOUNTER — TELEPHONE (OUTPATIENT)
Dept: FAMILY MEDICINE CLINIC | Facility: CLINIC | Age: 32
End: 2022-10-24

## 2022-10-24 DIAGNOSIS — I87.1 NUTCRACKER PHENOMENON OF RENAL VEIN: Primary | ICD-10-CM

## 2022-10-24 NOTE — TELEPHONE ENCOUNTER
Patient was advised at last office visit to continue taking cipro that was prescribed by [facility]. Patient is requesting to know if she needs to continue taking the cipro now that her urine culture is back? Patient has 3-4 doses left.

## 2022-11-16 ENCOUNTER — OFFICE VISIT (OUTPATIENT)
Dept: UROLOGY | Facility: CLINIC | Age: 32
End: 2022-11-16

## 2022-11-16 VITALS — WEIGHT: 160.4 LBS | BODY MASS INDEX: 23.76 KG/M2 | HEIGHT: 69 IN | RESPIRATION RATE: 12 BRPM

## 2022-11-16 DIAGNOSIS — R31.0 GROSS HEMATURIA: Primary | ICD-10-CM

## 2022-11-16 DIAGNOSIS — I87.1 NUTCRACKER PHENOMENON OF RENAL VEIN: ICD-10-CM

## 2022-11-16 PROCEDURE — 99203 OFFICE O/P NEW LOW 30 MIN: CPT | Performed by: UROLOGY

## 2022-11-16 NOTE — PROGRESS NOTES
UROLOGY OFFICE H&P NOTE    Subjective   HPI  Ailyn Bedoya is a 32 y.o. female.   The patient presents today for evaluation of personal history of UTIs, abnormal CT scan. She states that in 10/2022, she experienced symptoms that presented as a urinary tract infection.     She notes that it did not seem to be resolving after presenting to the [facility], so she went to her primary care physician.     She states that both times, her urine culture came back negative for bacteria, however, were positive for blood and leukocytes.     She states that when her primary care physician ordered a CT scan, they saw a few things that they were not sure about.   She reports that her symptoms when she feels like she is having a urinary tract infection include lower back pain and blood on wiping. She states that her back pain is typically all through her lower back, but occasionally, it is more severe on the left side.     She mentions that she had started her menstrual cycle the morning that she saw her primary care physician. She notes that 2 weeks prior to that when she presented to the [facility], she was experiencing urinary urgency every 5 minutes as well as hematuria.     She states that she has never smoked.   She denies a family history of cancer.   She denies a history of renal calculi.     She mentions that her mother has a history of hematuria.      Patient reports that she has history of preeclampsia.      ___________________  Urine culture  10/20/2022: No growth  9/21/2021: No growth  3/12/2020: No growth    CT abdomen pelvis stone protocol, 10/27/2022: No evidence of urolithiasis or hydronephrosis.  Narrow angle between the SMA and abdominal aorta.  Prominent left renal vein.  This can be seen   with nutcracker syndrome.        Results for orders placed or performed in visit on 10/20/22   Urine Culture - Urine, Urine, Clean Catch    Specimen: Urine, Clean Catch   Result Value Ref Range    Urine Culture No growth     POCT urinalysis dipstick, automated    Specimen: Urine   Result Value Ref Range    Color Yellow Yellow, Straw, Dark Yellow, Cecile    Clarity, UA Clear Clear    Specific Gravity  1.030 1.005 - 1.030    pH, Urine 7.0 5.0 - 8.0    Leukocytes Negative Negative    Nitrite, UA Negative Negative    Protein, POC Trace (A) Negative mg/dL    Glucose, UA Negative Negative mg/dL    Ketones, UA Negative Negative    Urobilinogen, UA 0.2 E.U./dL Normal, 0.2 E.U./dL    Bilirubin Negative Negative    Blood, UA Moderate (A) Negative    Lot Number [id number]     Expiration Date 2023          Medical History:  Past Medical History:   Diagnosis Date   • IBS (irritable bowel syndrome)    • Mild intermittent asthma - dx as a child, no meds    • Ovarian cyst    • Scoliosis         Social History:  Social History     Socioeconomic History   • Marital status:      Spouse name: Nithin   • Number of children: 1   • Years of education: 18   • Highest education level: Bachelor's degree (e.g., BA, AB, BS)   Tobacco Use   • Smoking status: Never   • Smokeless tobacco: Never   Vaping Use   • Vaping Use: Never used   Substance and Sexual Activity   • Alcohol use: No   • Drug use: No   • Sexual activity: Yes     Partners: Male        Family History:  Family History   Problem Relation Age of Onset   • No Known Problems Father         Surgical History:  Past Surgical History:   Procedure Laterality Date   •  SECTION      LTCS 2019 Preen 33+5 NRFHR, emergent   •  SECTION N/A 2022    Procedure:  SECTION REPEAT;  Surgeon: Beata Castillo DO;  Location: [facility] LABOR DELIVERY;  Service: Obstetrics/Gynecology;  Laterality: N/A;   • DILATATION AND CURETTAGE       Preen MAB, 6weeks by US, 9weeks by LMP        Allergies:  No Known Allergies     Current Medications:  Current Outpatient Medications   Medication Sig Dispense Refill   • acetaminophen (Tylenol) 325 MG tablet Take 2 tablets by mouth Every 6 (Six) Hours As  "Needed for Mild Pain  (alternate with motrin). 30 tablet 1   • ibuprofen (ADVIL,MOTRIN) 600 MG tablet Take 1 tablet by mouth Every 6 (Six) Hours As Needed for Mild Pain . 30 tablet 1     No current facility-administered medications for this visit.       Review of systems  A review of systems was performed, and positive findings are noted in the HPI.    Objective     Vital Signs:   Resp 12   Ht 175.3 cm (69\")   Wt 72.8 kg (160 lb 6.4 oz)   BMI 23.69 kg/m²       Physical exam  No acute distress, well-nourished  Awake alert and oriented  Mood normal; affect normal    Results  PROCEDURE:  CT ABDOMEN PELVIS STONE PROTOCOL     COMPARISON: None     INDICATIONS:  LBP. RECENT UTI, GROSS HEMATURIA. PT DENIES PREGNANCY.     PROTOCOL:     Standard imaging protocol performed                 RADIATION:      DLP: 405.6mGy*cm               Automated exposure control was utilized to minimize radiation dose.      TECHNIQUE: Axial images of the abdomen and pelvis without intravenous or oral contrast.      FINDINGS:     ABDOMEN:  The lung bases are clear.  Pectus excavatum is present.  The unenhanced liver, spleen, pancreas,   adrenal glands and right kidney are normal.  No evidence of nephrolithiasis or hydronephrosis.    There are no inflammatory changes around the gallbladder.  The angle between the superior   mesenteric artery and aorta is narrow.  The left renal vein is prominent measuring 1.4 cm in   transverse dimension.     PELVIS:  Scoliosis is present.  No ureteral or urinary bladder calcifications are identified.  Pelvic   phleboliths are present.  The unenhanced uterus and adnexa have a normal CT appearance.  The   appendix and terminal ileum are normal.  No CT evidence of colitis.  There is a central disc   protrusion at L4-L5.  The abdominal aorta has a normal caliber.  There is a physiologic amount of   pelvic free fluid.  Small fat containing umbilical hernia.     IMPRESSION:       1. No evidence of urolithiasis or " hydronephrosis.       2. Narrow angle between the SMA and abdominal aorta.  Prominent left renal vein.  This can be seen   with nutcracker syndrome.     3. Central disc protrusion at L4-L5.       JUAN LUIS TODD MD         Electronically Signed and Approved By: JUAN LUIS TODD MD on 10/21/2022 at 10:00               Problem List:  Patient Active Problem List   Diagnosis   • Preeclampsia in postpartum period   • Vitamin D deficiency   • Maternal varicella, non-immune       Assessment & Plan   Diagnoses and all orders for this visit:    1. Gross hematuria (Primary)  -     CT Abdomen Pelvis With & Without Contrast; Future    2. Nutcracker phenomenon of renal vein  -     CT Abdomen Pelvis With & Without Contrast; Future      Urine culture is negative for urinary tract infection plan: Unknown etiology at this point regarding her symptoms of UTI but no associated bacteriuria    CT scan imaging personally reviewed by me, demonstrated to patient.      Discussed limitations of a noncontrasted CT scan; discussed that she may potentially have an anatomic variant referred to as nutcracker syndrome which could be etiology for her current cyclical complaints however, requires contrasted CT scan for further evaluation.    We will order a triple phase CT scan. I counseled her on the role of a potential cystoscopy in working up her lower urinary tract as well.    Follow-up after triple phase CT scan.   All questions and concerns have been addressed at this time.        Transcribed from ambient dictation for Jessica Matias MD by Des Brown  11/16/22   12:41 EST    Patient or patient representative verbalized consent to the visit recording.  I have personally performed the services described in this document as transcribed by the above individual, and it is both accurate and complete.

## 2022-12-15 ENCOUNTER — TELEPHONE (OUTPATIENT)
Dept: UROLOGY | Facility: CLINIC | Age: 32
End: 2022-12-15

## 2022-12-15 NOTE — TELEPHONE ENCOUNTER
Spoke to Aminata at [facility] and advised that we can postpone the scan for a few weeks, but she does eventually need to have it done. Aminata advised that she will get it rescheduled.

## 2022-12-15 NOTE — TELEPHONE ENCOUNTER
Aminata at [facility] called to report that the Auth is still pending for the CT that is on for tomorrow. It is under medical review. Dr Matias can do a peer to peer or we can reschedule. Please call Aminata and let her know. The case number is [id number]. The number is her direct line.

## 2022-12-16 ENCOUNTER — APPOINTMENT (OUTPATIENT)
Dept: CT IMAGING | Facility: HOSPITAL | Age: 32
End: 2022-12-16

## 2023-01-11 ENCOUNTER — HOSPITAL ENCOUNTER (OUTPATIENT)
Dept: CT IMAGING | Facility: HOSPITAL | Age: 33
Discharge: HOME | End: 2023-01-11
Admitting: UROLOGY
Payer: COMMERCIAL

## 2023-01-11 DIAGNOSIS — R31.0 GROSS HEMATURIA: ICD-10-CM

## 2023-01-11 DIAGNOSIS — I87.1 NUTCRACKER PHENOMENON OF RENAL VEIN: ICD-10-CM

## 2023-01-11 PROCEDURE — 0 IOPAMIDOL PER 1 ML: Performed by: UROLOGY

## 2023-01-11 PROCEDURE — 74178 CT ABD&PLV WO CNTR FLWD CNTR: CPT

## 2023-01-11 RX ADMIN — IOPAMIDOL 100 ML: 755 INJECTION, SOLUTION INTRAVENOUS at 15:44

## 2023-01-19 ENCOUNTER — OFFICE VISIT (OUTPATIENT)
Dept: UROLOGY | Facility: CLINIC | Age: 33
End: 2023-01-19
Payer: COMMERCIAL

## 2023-01-19 VITALS — HEIGHT: 69 IN | WEIGHT: 158.2 LBS | BODY MASS INDEX: 23.43 KG/M2

## 2023-01-19 DIAGNOSIS — R31.0 GROSS HEMATURIA: Primary | ICD-10-CM

## 2023-01-19 PROCEDURE — 99212 OFFICE O/P EST SF 10 MIN: CPT | Performed by: UROLOGY

## 2023-01-19 NOTE — PROGRESS NOTES
UROLOGY OFFICE follow-up NOTE    Subjective   HPI  Ailyn Bedoya is a 32 y.o. female. The patient presents today for evaluation of personal history of UTIs, abnormal CT scan. She states that in 10/2022, she experienced symptoms that presented as a urinary tract infection.      She notes that it did not seem to be resolving after presenting to the [facility], so she went to her primary care physician.      She states that both times, her urine culture came back negative for bacteria, however, were positive for blood and leukocytes.      She states that when her primary care physician ordered a CT scan, they saw a few things that they were not sure about.   She reports that her symptoms when she feels like she is having a urinary tract infection include lower back pain and blood on wiping. She states that her back pain is typically all through her lower back, but occasionally, it is more severe on the left side.      She mentions that she had started her menstrual cycle the morning that she saw her primary care physician. She notes that 2 weeks prior to that when she presented to the [facility], she was experiencing urinary urgency every 5 minutes as well as hematuria.      She states that she has never smoked.   She denies a family history of cancer.   She denies a history of renal calculi.      She mentions that her mother has a history of hematuria.       Patient reports that she has history of preeclampsia.    Update 1/19/2023: Patient presents for follow-up of hematuria, possible nutcracker syndrome with contrasted CT scan prior.  The patient reports that she has not had any more hematuria since her last visit. She states that she jus had the 2 episodes of hematuria.   She denies smoking.   She denies any family history of bladder cancer.  She denies any further episodes of urinary urgency.  No new complaints today.        _______________  CT abdomen with and without IV contrast 1/11/2023:  There is no  "evidence of nutcracker syndrome.  The left renal vein is normal.  The left ovarian vein is normal.  Bilateral normal kidneys, no mass, obstruction or calcification.    Urine culture  10/20/2022: No growth  9/21/2021: No growth  3/12/2020: No growth     CT abdomen pelvis stone protocol, 10/27/2022: No evidence of urolithiasis or hydronephrosis.  Narrow angle between the SMA and abdominal aorta.  Prominent left renal vein.  This can be seen   with nutcracker syndrome.    Review of systems  A review of systems was performed, and positive findings are noted in the HPI.    Objective     Vital Signs:   Ht 175.3 cm (69\")   Wt 71.8 kg (158 lb 3.2 oz)   BMI 23.36 kg/m²       Physical exam  No acute distress, well-nourished  Awake alert and oriented  Mood normal; affect normal    Results  PROCEDURE:  CT ABDOMEN PELVIS W WO CONTRAST     COMPARISON:  [facility], CT, CT ABDOMEN PELVIS STONE PROTOCOL, 10/21/2022,   9:38.  INDICATIONS:  hematuria, ? nutcracker syndrome, flank pain     TECHNIQUE:    After obtaining the patient's consent, CT images of the abdomen were created without and   with non-ionic intravenous contrast material, and CT images of the pelvis were obtained with   non-ionic intravenous contrast material.       PROTOCOL:     Urology imaging protocol performed                 RADIATION:      DLP: 774mGy*cm               Automated exposure control was utilized to minimize radiation dose.   CONTRAST:      100cc Isovue 370 I.V.     FINDINGS:          LIVER:  Normal.  No enlargement, atrophy, abnormal density, or significant focal lesion.    BILIARY:            Normal.  No visible dilatation or calcification.    PANCREAS:      Normal.  No lesion, fluid collection, ductal dilatation, or atrophy.    SPLEEN:           Normal.  No enlargement or focal lesion.    KIDNEYS:          Normal.  No mass, obstruction, or calcification.    ADRENALS:      Normal.  No mass or enlargement.    AORTA/VASCULAR:     "  Normal.  No aneurysm or dissection.    RETROPERITONEUM:  Normal.  No mass or adenopathy.    BOWEL/MESENTERY:  There is a moderate amount of stool throughout the colon with stool to the cecum.    There is no inflammatory change of the bowel.  The appendix is normal.  ABDOMINAL WALL:      Normal.  No mass or hernia.    URINARY BLADDER:    Normal.  No visible focal wall thickening, lesion, or calculus.    PELVIC NODES:            Normal.  No adenopathy.    PELVIC ORGANS:         Normal.  No visible mass.  Pelvic organs appropriate for patient age.    BONES:             Normal.  No bony lesion or fracture.    LUNG BASES:  Normal.  No visible pulmonary or pleural disease.    OTHER:             There is no evidence of nutcracker syndrome.  The left renal vein is normal.  The left   ovarian vein is normal.     IMPRESSION:               No acute disease.    Moderate stool burden may indicate constipation.           DESHAUN ESCUDERO MD         Electronically Signed and Approved By: DESHAUN ESCUDERO MD on 1/11/2023 at 21:23             Problem List:  Patient Active Problem List   Diagnosis   • Preeclampsia in postpartum period   • Vitamin D deficiency   • Maternal varicella, non-immune       Assessment & Plan        Diagnoses and all orders for this visit:    1. Gross hematuria (Primary)  -     Urinalysis With Microscopic - Urine, Clean Catch; Future      Patient doing well; no recurrence of hematuria or urgency episodes since last visit  CT scan imaging reviewed, negative for nutcracker syndrome,showed normal left renal vein and ovarian vein.  Findings reassuring.    Patient with history of gross hematuria, negative CT scan, no significant risk factors, consider low risk.  Per American urologic Association guidelines recommend recheck of urinalysis with microscopy in 6 months    We will obtain a urinalysis with microscopy in 6 months. Depending on the results, we will consider a cystoscopy.  She is agreeable    Follow-up in 6  months, UA with micro (specimen cup provided).   All questions and concerns have been addressed at this time.            Transcribed from ambient dictation for Jessica Matias MD by Debra Hagan.  01/19/23   16:28 EST    Patient or patient representative verbalized consent to the visit recording.  I have personally performed the services described in this document as transcribed by the above individual, and it is both accurate and complete.

## 2023-04-19 ENCOUNTER — APPOINTMENT (OUTPATIENT)
Dept: CT IMAGING | Facility: HOSPITAL | Age: 33
End: 2023-04-19
Payer: COMMERCIAL

## 2023-04-19 ENCOUNTER — HOSPITAL ENCOUNTER (EMERGENCY)
Facility: HOSPITAL | Age: 33
Discharge: HOME | End: 2023-04-20
Attending: EMERGENCY MEDICINE | Admitting: EMERGENCY MEDICINE
Payer: COMMERCIAL

## 2023-04-19 VITALS
TEMPERATURE: 97.8 F | WEIGHT: 154.1 LBS | RESPIRATION RATE: 17 BRPM | HEIGHT: 69 IN | SYSTOLIC BLOOD PRESSURE: 129 MMHG | OXYGEN SATURATION: 100 % | BODY MASS INDEX: 22.82 KG/M2 | DIASTOLIC BLOOD PRESSURE: 79 MMHG | HEART RATE: 82 BPM

## 2023-04-19 DIAGNOSIS — Z04.1 ENCOUNTER FOR EXAMINATION FOLLOWING MOTOR VEHICLE COLLISION (MVC): ICD-10-CM

## 2023-04-19 DIAGNOSIS — G44.319 ACUTE POST-TRAUMATIC HEADACHE, NOT INTRACTABLE: Primary | ICD-10-CM

## 2023-04-19 LAB
ALBUMIN SERPL-MCNC: 4.6 G/DL (ref 3.5–5.2)
ALBUMIN/GLOB SERPL: 2.2 G/DL
ALP SERPL-CCNC: 52 U/L (ref 39–117)
ALT SERPL W P-5'-P-CCNC: 10 U/L (ref 1–33)
ANION GAP SERPL CALCULATED.3IONS-SCNC: 10.3 MMOL/L (ref 5–15)
AST SERPL-CCNC: 14 U/L (ref 1–32)
BASOPHILS # BLD AUTO: 0.05 10*3/MM3 (ref 0–0.2)
BASOPHILS NFR BLD AUTO: 0.9 % (ref 0–1.5)
BILIRUB SERPL-MCNC: 0.4 MG/DL (ref 0–1.2)
BUN SERPL-MCNC: 12 MG/DL (ref 6–20)
BUN/CREAT SERPL: 14.8 (ref 7–25)
CALCIUM SPEC-SCNC: 9.2 MG/DL (ref 8.6–10.5)
CHLORIDE SERPL-SCNC: 102 MMOL/L (ref 98–107)
CO2 SERPL-SCNC: 27.7 MMOL/L (ref 22–29)
CREAT SERPL-MCNC: 0.81 MG/DL (ref 0.57–1)
DEPRECATED RDW RBC AUTO: 40.7 FL (ref 37–54)
EGFRCR SERPLBLD CKD-EPI 2021: 100.3 ML/MIN/1.73
EOSINOPHIL # BLD AUTO: 0.11 10*3/MM3 (ref 0–0.4)
EOSINOPHIL NFR BLD AUTO: 1.9 % (ref 0.3–6.2)
ERYTHROCYTE [DISTWIDTH] IN BLOOD BY AUTOMATED COUNT: 12.3 % (ref 12.3–15.4)
GLOBULIN UR ELPH-MCNC: 2.1 GM/DL
GLUCOSE SERPL-MCNC: 97 MG/DL (ref 65–99)
HCT VFR BLD AUTO: 41.3 % (ref 34–46.6)
HGB BLD-MCNC: 13.4 G/DL (ref 12–15.9)
HOLD SPECIMEN: NORMAL
HOLD SPECIMEN: NORMAL
IMM GRANULOCYTES # BLD AUTO: 0.01 10*3/MM3 (ref 0–0.05)
IMM GRANULOCYTES NFR BLD AUTO: 0.2 % (ref 0–0.5)
LYMPHOCYTES # BLD AUTO: 2.1 10*3/MM3 (ref 0.7–3.1)
LYMPHOCYTES NFR BLD AUTO: 36.3 % (ref 19.6–45.3)
MCH RBC QN AUTO: 29.5 PG (ref 26.6–33)
MCHC RBC AUTO-ENTMCNC: 32.4 G/DL (ref 31.5–35.7)
MCV RBC AUTO: 91 FL (ref 79–97)
MONOCYTES # BLD AUTO: 0.35 10*3/MM3 (ref 0.1–0.9)
MONOCYTES NFR BLD AUTO: 6 % (ref 5–12)
NEUTROPHILS NFR BLD AUTO: 3.17 10*3/MM3 (ref 1.7–7)
NEUTROPHILS NFR BLD AUTO: 54.7 % (ref 42.7–76)
NRBC BLD AUTO-RTO: 0 /100 WBC (ref 0–0.2)
PLATELET # BLD AUTO: 288 10*3/MM3 (ref 140–450)
PMV BLD AUTO: 9.7 FL (ref 6–12)
POTASSIUM SERPL-SCNC: 4.2 MMOL/L (ref 3.5–5.2)
PROT SERPL-MCNC: 6.7 G/DL (ref 6–8.5)
RBC # BLD AUTO: 4.54 10*6/MM3 (ref 3.77–5.28)
SODIUM SERPL-SCNC: 140 MMOL/L (ref 136–145)
WBC NRBC COR # BLD: 5.79 10*3/MM3 (ref 3.4–10.8)
WHOLE BLOOD HOLD COAG: NORMAL
WHOLE BLOOD HOLD SPECIMEN: NORMAL

## 2023-04-19 PROCEDURE — 96375 TX/PRO/DX INJ NEW DRUG ADDON: CPT

## 2023-04-19 PROCEDURE — 70450 CT HEAD/BRAIN W/O DYE: CPT

## 2023-04-19 PROCEDURE — 96374 THER/PROPH/DIAG INJ IV PUSH: CPT

## 2023-04-19 PROCEDURE — 25010000002 ONDANSETRON PER 1 MG: Performed by: EMERGENCY MEDICINE

## 2023-04-19 PROCEDURE — 85025 COMPLETE CBC W/AUTO DIFF WBC: CPT

## 2023-04-19 PROCEDURE — 36415 COLL VENOUS BLD VENIPUNCTURE: CPT

## 2023-04-19 PROCEDURE — 80053 COMPREHEN METABOLIC PANEL: CPT

## 2023-04-19 PROCEDURE — 25010000002 KETOROLAC TROMETHAMINE PER 15 MG: Performed by: EMERGENCY MEDICINE

## 2023-04-19 PROCEDURE — 99283 EMERGENCY DEPT VISIT LOW MDM: CPT

## 2023-04-19 RX ORDER — KETOROLAC TROMETHAMINE 30 MG/ML
30 INJECTION, SOLUTION INTRAMUSCULAR; INTRAVENOUS ONCE
Status: COMPLETED | OUTPATIENT
Start: 2023-04-19 | End: 2023-04-19

## 2023-04-19 RX ORDER — ONDANSETRON 2 MG/ML
4 INJECTION INTRAMUSCULAR; INTRAVENOUS ONCE
Status: COMPLETED | OUTPATIENT
Start: 2023-04-19 | End: 2023-04-19

## 2023-04-19 RX ORDER — SODIUM CHLORIDE 0.9 % (FLUSH) 0.9 %
10 SYRINGE (ML) INJECTION AS NEEDED
Status: DISCONTINUED | OUTPATIENT
Start: 2023-04-19 | End: 2023-04-20 | Stop reason: HOSPADM

## 2023-04-19 RX ADMIN — ONDANSETRON 4 MG: 2 INJECTION INTRAMUSCULAR; INTRAVENOUS at 23:27

## 2023-04-19 RX ADMIN — KETOROLAC TROMETHAMINE 30 MG: 30 INJECTION, SOLUTION INTRAMUSCULAR; INTRAVENOUS at 23:27
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
## 2023-04-20 RX ORDER — ONDANSETRON 8 MG/1
8 TABLET, ORALLY DISINTEGRATING ORAL EVERY 8 HOURS PRN
Qty: 20 TABLET | Refills: 0 | Status: SHIPPED | OUTPATIENT
Start: 2023-04-20
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
## 2023-04-20 NOTE — DISCHARGE INSTRUCTIONS
Avoid any strenuous activities.  Take Tylenol and/or Motrin for headache.  Follow-up with your family doctor as needed.  Return to the ER for any new or worsening symptoms.

## 2023-05-22 NOTE — PROGRESS NOTES
Well Woman Visit    CC: Scheduled annual well gyn visit  Chief Complaint   Patient presents with   • WWE     Vaginal itchiness, discharge        Prior Myriad or Hereditary Cancer testing (attach copy of result if yes): no    HPI:   32 y.o.   Social History     Substance and Sexual Activity   Sexual Activity Yes   • Partners: Male   • Birth control/protection: Condom     2021 Pap smear negative    Vag itch and odor for a couple weeks.  No STD concerns    Menses:   q mo, lasts 4-5 days, changes products q 3-4hrs on heaviest days.   Pain with menses:  None    PCP: does manage PMHx and preventative labs  History: PMHx, Meds, Allergies, PSHx, Social Hx, and POBHx all reviewed and updated.    PHYSICAL EXAM:  /66   Pulse 79   Wt 68 kg (150 lb)   LMP 2023 (Approximate) Comment: last cycle was normal  Breastfeeding No   BMI 22.15 kg/m²  Not found.  General- NAD, alert and oriented, appropriate  Psych- Normal mood, good memory  Neck- No masses, no thyroid enlargement  CV- Regular rhythm, no murnurs  Resp- CTA to bases, no wheezes  Abdomen- Soft, non distended, non tender, no masses    Breast left-  Bilaterally symmetrical, no masses, non tender, no nipple discharge, no axillary or supraclavicular nodes palpable  Breast right- Bilaterally symmetrical, no masses, non tender, no nipple discharge, no axillary or supraclavicular nodes palpable    External genitalia- Normal female, no lesions  Urethra/meatus- Normal, no masses, non tender  Bladder- Normal, no masses, non tender  Vagina- Normal, no atrophy, no lesions, no discharge.  Prolapse: none noted, not examined with split speculum to delineate  Cvx- Normal, no lesions, no discharge, No cervical motion tenderness  Uterus- Normal size, shape & consistency.  Non tender, mobile, & no prolapse  Adnexa- No mass, non tender  Anus/Rectum/Perineum- Not performed    Lymphatic- No palpable neck, axillary, or groin nodes  Ext- No edema, no cyanosis    Skin- No  lesions, no rashes, no acanthosis nigricans      ASSESSMENT and PLAN:    Diagnoses and all orders for this visit:    1. Well woman exam (Primary)  -     IgP, Aptima HPV    2. Birth control - plans condoms    3. Vaginal odor  -     Gardnerella vaginalis, Trichomonas vaginalis, Candida albicans, DNA - Swab, Vagina; Future  -     Gardnerella vaginalis, Trichomonas vaginalis, Candida albicans, DNA - Swab, Vagina        Preventative:  • BREAST HEALTH- Monthly self breast exam importance and how to reviewed. MMG and/or MRI (prn) reviewed per society guidelines and her individual history. Screen: Not medically needed  • CERVICAL CANCER Screening- Reviewed current ASCCP guidelines for screening w and wo cotest HPV, age specific.  Screen: Updated today  • COLON CANCER Screening- Reviewed current medical society guidelines and options.  Screen:  Not medically needed  • Importance of WEIGHT MANAGEMENT, nutrition, and exercise reviewed  • BONE HEALTH- Reviewed current medical society guidelines and options for testing, calcium and vit D intake.  Weight bearing exercise.  DEXA: Not medically needed  • VACCINATIONS Recommended: COVID and booster PRN, Flu annually, Gardisil/HPV vaccine (up to 46yo), Tdap [id number].  Importance discussed, risk being unvaccinated reviewed.  Questions answered  • Smoking status- NON SMOKER  • Follow up PCP/Specialist PMHx and Labs        She understands the importance of having any ordered tests to be performed in a timely fashion.  The risks of not performing them include, but are not limited to, advanced cancer stages, bone loss from osteoporosis and/or subsequent increase in morbidity and/or mortality.  She is encouraged to review her results online and/or contact or office if she has questions.     Follow Up:  Return in about 1 year (around 5/23/2024) for WWE.            Beata Castillo,   05/23/2023    [facility]  [address] [place]  [address]  Dept: [phone]  Dept Fax: [phone]  Loc: [phone]  Loc Fax: [phone]

## 2023-05-23 ENCOUNTER — OFFICE VISIT (OUTPATIENT)
Dept: OBSTETRICS AND GYNECOLOGY | Facility: CLINIC | Age: 33
End: 2023-05-23
Payer: COMMERCIAL

## 2023-05-23 VITALS
SYSTOLIC BLOOD PRESSURE: 102 MMHG | DIASTOLIC BLOOD PRESSURE: 66 MMHG | HEART RATE: 79 BPM | WEIGHT: 150 LBS | BODY MASS INDEX: 22.15 KG/M2

## 2023-05-23 DIAGNOSIS — Z30.09 BIRTH CONTROL COUNSELING: ICD-10-CM

## 2023-05-23 DIAGNOSIS — Z01.419 WELL WOMAN EXAM: Primary | ICD-10-CM

## 2023-05-23 DIAGNOSIS — N89.8 VAGINAL ODOR: ICD-10-CM

## 2023-05-23 LAB
CANDIDA SPECIES: NEGATIVE
GARDNERELLA VAGINALIS: NEGATIVE
T VAGINALIS DNA VAG QL PROBE+SIG AMP: NEGATIVE

## 2023-05-23 PROCEDURE — 87660 TRICHOMONAS VAGIN DIR PROBE: CPT | Performed by: OBSTETRICS & GYNECOLOGY

## 2023-05-23 PROCEDURE — 87624 HPV HI-RISK TYP POOLED RSLT: CPT | Performed by: OBSTETRICS & GYNECOLOGY

## 2023-05-23 PROCEDURE — G0123 SCREEN CERV/VAG THIN LAYER: HCPCS | Performed by: OBSTETRICS & GYNECOLOGY

## 2023-05-23 PROCEDURE — 87510 GARDNER VAG DNA DIR PROBE: CPT | Performed by: OBSTETRICS & GYNECOLOGY

## 2023-05-23 PROCEDURE — 87480 CANDIDA DNA DIR PROBE: CPT | Performed by: OBSTETRICS & GYNECOLOGY

## 2023-05-30 PROBLEM — O09.899 MATERNAL VARICELLA, NON-IMMUNE: Status: RESOLVED | Noted: 2022-05-16 | Resolved: 2023-05-30

## 2023-05-30 PROBLEM — Z28.39 MATERNAL VARICELLA, NON-IMMUNE: Status: RESOLVED | Noted: 2022-05-16 | Resolved: 2023-05-30

## 2023-05-30 PROBLEM — E55.9 VITAMIN D DEFICIENCY: Status: RESOLVED | Noted: 2022-05-03 | Resolved: 2023-05-30

## 2023-05-30 LAB
CYTOLOGIST CVX/VAG CYTO: NORMAL
CYTOLOGY CVX/VAG DOC CYTO: NORMAL
CYTOLOGY CVX/VAG DOC THIN PREP: NORMAL
DX ICD CODE: NORMAL
HIV 1 & 2 AB SER-IMP: NORMAL
HPV I/H RISK 4 DNA CVX QL PROBE+SIG AMP: NEGATIVE
OTHER STN SPEC: NORMAL
STAT OF ADQ CVX/VAG CYTO-IMP: NORMAL

## 2023-06-05 ENCOUNTER — OFFICE VISIT (OUTPATIENT)
Dept: FAMILY MEDICINE CLINIC | Facility: CLINIC | Age: 33
End: 2023-06-05
Payer: OTHER MISCELLANEOUS

## 2023-06-05 VITALS
DIASTOLIC BLOOD PRESSURE: 66 MMHG | TEMPERATURE: 98.3 F | HEIGHT: 69 IN | SYSTOLIC BLOOD PRESSURE: 108 MMHG | BODY MASS INDEX: 21.66 KG/M2 | WEIGHT: 146.2 LBS | HEART RATE: 114 BPM | OXYGEN SATURATION: 98 %

## 2023-06-05 DIAGNOSIS — F07.81 POST CONCUSSIVE SYNDROME: Primary | ICD-10-CM
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
## 2023-08-04 ENCOUNTER — TELEPHONE (OUTPATIENT)
Dept: UROLOGY | Facility: CLINIC | Age: 33
End: 2023-08-04
Payer: OTHER MISCELLANEOUS

## 2023-08-08 NOTE — TELEPHONE ENCOUNTER
3RD CALL - CALLED TO OFF R/S OF CX'D APPT W/ NALINI ON 7/19.    NO ANSWER, LMOM    THREE ATTEMPTS HAVE BEEN MADE TO OFFER RS, ANYTHING ELSE TO DO?

## 2024-01-03 ENCOUNTER — OFFICE VISIT (OUTPATIENT)
Dept: FAMILY MEDICINE CLINIC | Facility: CLINIC | Age: 34
End: 2024-01-03
Payer: COMMERCIAL

## 2024-01-03 VITALS
BODY MASS INDEX: 22.75 KG/M2 | WEIGHT: 153.6 LBS | DIASTOLIC BLOOD PRESSURE: 68 MMHG | TEMPERATURE: 97.8 F | OXYGEN SATURATION: 98 % | HEART RATE: 94 BPM | HEIGHT: 69 IN | SYSTOLIC BLOOD PRESSURE: 118 MMHG

## 2024-01-03 DIAGNOSIS — R10.84 GENERALIZED ABDOMINAL PAIN: Primary | ICD-10-CM

## 2024-01-03 DIAGNOSIS — R11.0 NAUSEA: ICD-10-CM

## 2024-01-03 DIAGNOSIS — K21.00 GASTROESOPHAGEAL REFLUX DISEASE WITH ESOPHAGITIS WITHOUT HEMORRHAGE: ICD-10-CM

## 2024-01-03 PROCEDURE — 99213 OFFICE O/P EST LOW 20 MIN: CPT

## 2024-01-03 RX ORDER — OMEPRAZOLE 40 MG/1
40 CAPSULE, DELAYED RELEASE ORAL DAILY
Qty: 30 CAPSULE | Refills: 0 | Status: SHIPPED | OUTPATIENT
Start: 2024-01-03

## 2024-01-03 NOTE — PROGRESS NOTES
Ailyn Bedoya presents to [facility] with complaints of abdominal pain and discomfort, nausea, pain after eating.      History of Present Illness  This is a 33-year-old female who presents to the clinic with complaints of abdominal pain and discomfort, nausea, and pain after eating.    States that her symptoms have actually been ongoing since Saturday.  Patient states the last meal she had was Saturday for lunch at [employer], and that afternoon she started feeling poorly.  Patient states that she has had some generalized abdominal pain, started as epigastric abdominal pain that would feel at times like a sharp stabbing-like pain, but then would radiate to just generalized abdominal pain.  Noted some nausea associated with it.  Patient also states that it really affected her anytime that she would eat anything.  Patient states that she is ate several different foods, but does not continue to get the pain, is worse after eating and is pretty unrelenting.  States that she has not really had any issues with her bowels.  States that they have been pretty regular and she has actually had 2 bowel movements the past 2 days.  Denies any fever/chill/bodyaches.  States that she has been nauseous but no vomiting.  Is really unsure what could be causing her symptoms.  She has tried over-the-counter medications, the only time she is able to get relief is if she takes something right before she goes and lays down, and then the Pepcid/not moving of body does help with the pain temporarily but as soon as she gets up it returns.  Does still have gallbladder.    The following portions of the patient's history were personally reviewed and updated as appropriate: allergies, current medications, past medical history, past surgical history, past family history, and past social history.       Objective   Vital Signs:   /68 (BP Location: Left arm, Patient Position: Sitting, Cuff Size: Adult)    "Pulse 94   Temp 97.8 °F (36.6 °C) (Temporal)   Ht 175.3 cm (69\")   Wt 69.7 kg (153 lb 9.6 oz)   SpO2 98%   BMI 22.68 kg/m²     Body mass index is 22.68 kg/m².    All labs, imaging, test results, and specialty provider notes reviewed with patient.     Physical Exam  Vitals reviewed.   Constitutional:       Appearance: Normal appearance.   Pulmonary:      Effort: Pulmonary effort is normal.   Abdominal:      General: Bowel sounds are normal. There is distension.      Palpations: Abdomen is soft.      Tenderness: There is abdominal tenderness in the right lower quadrant and left lower quadrant.      Hernia: No hernia is present.   Neurological:      General: No focal deficit present.      Mental Status: She is alert and oriented to person, place, and time.                  BMI is within normal parameters. No other follow-up for BMI required.            Assessment and Plan:  Diagnoses and all orders for this visit:    1. Generalized abdominal pain (Primary)  -     US Abdomen Complete; Future    2. Nausea  -     US Abdomen Complete; Future    3. Gastroesophageal reflux disease with esophagitis without hemorrhage  -     omeprazole (priLOSEC) 40 MG capsule; Take 1 capsule by mouth Daily.  Dispense: 30 capsule; Refill: 0    Question whether patient could have underlying H. pylori infection or other GI illness.  We will do a rectal swab to evaluate and rule this out.  Will also obtain an abdominal ultrasound to evaluate the gallbladder, assess for any possible hernia, or other signs of infection or abnormalities that could be contributing to abdominal pain.  Will also go ahead and treat for acid reflux with 30-day course of omeprazole.  Did discuss with patient warning signs, worsening symptoms, and when to seek emergency evaluation.  She verbalized understanding.      Follow Up:  No follow-ups on file.    Patient was given instructions and counseling regarding her condition or for health maintenance advice. Please see " specific information pulled into the AVS if appropriate.

## 2024-01-05 ENCOUNTER — TELEPHONE (OUTPATIENT)
Dept: FAMILY MEDICINE CLINIC | Facility: CLINIC | Age: 34
End: 2024-01-05
Payer: OTHER MISCELLANEOUS

## 2024-01-05 NOTE — TELEPHONE ENCOUNTER
Called Ailyn, advised that [facility] has 48 business hours to read her imaging reports, that we may not have results until next week. She reports feeling worsening since this all started, so I advised if she is worsening she may want to go to the ER for evaluation. Ailyn verbalized understanding

## 2024-01-05 NOTE — TELEPHONE ENCOUNTER
Caller: Ailyn Bedoya    Relationship: Self    Best call back number: [phone]         What test was performed: ULTRA SOUND ABDOMEN    When was the test performed: 01/05/2024    Where was the test performed: [facility]    Additional notes: PATIENT REQUESTS CALLBACK AS SOON AS RESULTS ARE AVAILABLE

## 2024-05-16 NOTE — PROGRESS NOTES
"Well Woman Visit    CC: Scheduled annual well gyn visit  Chief Complaint   Patient presents with    Gynecologic Exam       HPI:   33 y.o.   Social History     Substance and Sexual Activity   Sexual Activity Yes    Partners: Male    Birth control/protection: Condom     Office Visit with Beata Castillo DO (2023)  IgSUSAN Jeb HPV (2023 15:44) pap neg and hpv neg    History of preeclampsia and 2 CS  Menses:   q mo, lasts 4 days, changes products q 4-6hrs on heaviest days.   Pain with menses:  Mild, OTC meds control discomfort    Pt has no complaints today.  Has questions re safety of another pregnancy.     PCP: no recent preventative labs  History: PMHx, Meds, Allergies, PSHx, Social Hx, and POBHx all reviewed and updated.    PHYSICAL EXAM:  /59   Pulse 89   Ht 175.3 cm (69\")   Wt 70.3 kg (155 lb)   LMP 2024   BMI 22.89 kg/m²  Not found.   General- NAD, alert and oriented, appropriate  Psych- Normal mood, good memory  Neck- No masses, no thyroid enlargement  CV- Regular rhythm, no murmurs  Resp- CTA to bases, no wheezes  Abdomen- Soft, non distended, non tender, no masses    Chaperone present during breast and/or pelvic exam if performed.  Breast left-  Bilaterally symmetrical, no masses, non tender, no nipple discharge, no axillary or supraclavicular nodes palpable.    Breast right- Bilaterally symmetrical, no masses, non tender, no nipple discharge, no axillary or supraclavicular nodes palpable.      External genitalia- Normal female, no lesions  Urethra/meatus- Normal, no masses, non tender  Bladder- Normal, no masses, non tender  Vagina- Normal, no atrophy, no lesions, no discharge.    Prolapse : none noted, not examined with split speculum to delineate  Cvx- Normal, no lesions, no discharge, No cervical motion tenderness  Uterus- Normal size, shape & consistency.  Non tender, mobile.  Adnexa- No mass, non tender  Anus/Rectum/Perineum- Not performed    Lymphatic- No palpable neck, " axillary, or groin nodes  Ext- No edema, no cyanosis    Skin- No lesions, no rashes, no acanthosis nigricans      ASSESSMENT and PLAN:    Diagnoses and all orders for this visit:    1. Well woman exam (Primary)  -     Hemoglobin A1c  -     Lipid Panel  -     TSH    2. Birth control counseling  Comments:  Considering another baby, declines      If she plans to conceive I recommend starting a prenatal vitamin.  We discussed complications associated with repeat C-sections and preeclampsia and a subsequent pregnancy.  We discussed the use of baby aspirin and monitoring throughout her next pregnancy.  Questions answered    Preventative:  BREAST HEALTH- Monthly self breast exam importance and how to reviewed. MMG and/or MRI (prn) reviewed per society guidelines and her individual history. Screen: Not medically needed  CERVICAL CANCER Screening- Reviewed current ASCCP guidelines for screening w and wo cotest HPV, age specific.  Screen: Already up to date  COLON CANCER Screening- Reviewed current medical society guidelines and options.  Screen:  Not medically needed  Importance of WEIGHT MANAGEMENT, nutrition, and exercise reviewed  BONE HEALTH- Reviewed current medical society guidelines and options for testing, calcium and vit D intake.  Weight bearing exercise.  DEXA: Not medically needed  VACCINATIONS Recommended: Covid vaccine, Flu annually.  Importance discussed, risk being unvaccinated reviewed.  Questions answered  Smoking status- NON SMOKER/VAPER  Follow up PCP/Specialist PMHx and/or Labs          She understands the importance of having any ordered tests to be performed in a timely fashion.  The risks of not performing them include, but are not limited to, advanced cancer stages, bone loss from osteoporosis and/or subsequent increase in morbidity and/or mortality.  She is encouraged to review her results online and/or contact or office if she has questions.     Follow Up:  Return in about 1 year (around 5/24/2025)  for HENRIETTA Castillo,   05/24/2024    [facility]  [address] [place] [address]  Dept: [phone]  Dept Fax: [phone]  Loc: [phone]  Loc Fax: [phone]

## 2024-05-24 ENCOUNTER — OFFICE VISIT (OUTPATIENT)
Dept: OBSTETRICS AND GYNECOLOGY | Facility: CLINIC | Age: 34
End: 2024-05-24
Payer: COMMERCIAL

## 2024-05-24 VITALS
WEIGHT: 155 LBS | BODY MASS INDEX: 22.96 KG/M2 | HEIGHT: 69 IN | DIASTOLIC BLOOD PRESSURE: 59 MMHG | SYSTOLIC BLOOD PRESSURE: 116 MMHG | HEART RATE: 89 BPM

## 2024-05-24 DIAGNOSIS — Z30.09 BIRTH CONTROL COUNSELING: ICD-10-CM

## 2024-05-24 DIAGNOSIS — Z01.419 WELL WOMAN EXAM: Primary | ICD-10-CM

## 2024-05-24 LAB
CHOLEST SERPL-MCNC: 172 MG/DL (ref 0–200)
HBA1C MFR BLD: 5.2 % (ref 4.8–5.6)
HDLC SERPL-MCNC: 50 MG/DL (ref 40–60)
LDLC SERPL CALC-MCNC: 105 MG/DL (ref 0–100)
LDLC/HDLC SERPL: 2.07 {RATIO}
TRIGL SERPL-MCNC: 93 MG/DL (ref 0–150)
TSH SERPL DL<=0.05 MIU/L-ACNC: 1.31 UIU/ML (ref 0.27–4.2)
VLDLC SERPL-MCNC: 17 MG/DL (ref 5–40)

## 2024-05-24 PROCEDURE — 80061 LIPID PANEL: CPT | Performed by: OBSTETRICS & GYNECOLOGY

## 2024-05-24 PROCEDURE — 84443 ASSAY THYROID STIM HORMONE: CPT | Performed by: OBSTETRICS & GYNECOLOGY

## 2024-05-24 PROCEDURE — 83036 HEMOGLOBIN GLYCOSYLATED A1C: CPT | Performed by: OBSTETRICS & GYNECOLOGY

## 2024-07-23 ENCOUNTER — OFFICE VISIT (OUTPATIENT)
Dept: FAMILY MEDICINE CLINIC | Facility: CLINIC | Age: 34
End: 2024-07-23
Payer: COMMERCIAL

## 2024-07-23 VITALS
BODY MASS INDEX: 23.43 KG/M2 | SYSTOLIC BLOOD PRESSURE: 130 MMHG | TEMPERATURE: 97 F | HEIGHT: 69 IN | OXYGEN SATURATION: 99 % | HEART RATE: 93 BPM | WEIGHT: 158.2 LBS | DIASTOLIC BLOOD PRESSURE: 68 MMHG

## 2024-07-23 DIAGNOSIS — R10.9 FLANK PAIN: ICD-10-CM

## 2024-07-23 DIAGNOSIS — R59.1 LYMPHADENOPATHY: Primary | ICD-10-CM

## 2024-07-23 LAB
BILIRUB BLD-MCNC: NEGATIVE MG/DL
CLARITY, POC: CLEAR
COLOR UR: YELLOW
EXPIRATION DATE: ABNORMAL
GLUCOSE UR STRIP-MCNC: NEGATIVE MG/DL
KETONES UR QL: NEGATIVE
LEUKOCYTE EST, POC: NEGATIVE
Lab: ABNORMAL
NITRITE UR-MCNC: NEGATIVE MG/ML
PH UR: 7 [PH] (ref 5–8)
PROT UR STRIP-MCNC: NEGATIVE MG/DL
RBC # UR STRIP: ABNORMAL /UL
SP GR UR: 1.01 (ref 1–1.03)
UROBILINOGEN UR QL: NORMAL

## 2024-07-23 PROCEDURE — 99213 OFFICE O/P EST LOW 20 MIN: CPT | Performed by: NURSE PRACTITIONER

## 2024-07-23 PROCEDURE — 81003 URINALYSIS AUTO W/O SCOPE: CPT | Performed by: NURSE PRACTITIONER

## 2024-07-23 NOTE — PROGRESS NOTES
"Chief Complaint  Nodule to right groin    Subjective         Ailyn Bedoya presents to [facility]  Presents to the office with concerns over a nodule that she found on her right groin today.  She states that the area is sore to touch.  She denies any redness or drainage.  He denies any fevers.  Patient does state over the weekend she was having right flank pain.  She states that she was unsure if she pulled a muscle or if she was having a mild UTI as she was having some mild dysuria as well.  I did explain that we would check a urinalysis as well as a culture.         Objective     Vitals:    07/23/24 1325   BP: 130/68   BP Location: Right arm   Patient Position: Sitting   Cuff Size: Adult   Pulse: 93   Temp: 97 °F (36.1 °C)   TempSrc: Temporal   SpO2: 99%   Weight: 71.8 kg (158 lb 3.2 oz)   Height: 175.3 cm (69\")      Body mass index is 23.36 kg/m².    BMI is within normal parameters. No other follow-up for BMI required.        Physical Exam  Vitals reviewed.   Constitutional:       Appearance: Normal appearance.   Cardiovascular:      Rate and Rhythm: Normal rate and regular rhythm.      Pulses: Normal pulses.      Heart sounds: Normal heart sounds, S1 normal and S2 normal. No murmur heard.  Pulmonary:      Effort: Pulmonary effort is normal. No respiratory distress.      Breath sounds: Normal breath sounds.   Lymphadenopathy:      Lower Body: Right inguinal adenopathy present.   Skin:     General: Skin is warm and dry.   Neurological:      Mental Status: She is alert and oriented to person, place, and time.   Psychiatric:         Attention and Perception: Attention normal.         Mood and Affect: Mood normal.         Behavior: Behavior normal.          Result Review :   The following data was reviewed by: MONO Kwon on 07/23/2024:      Procedures    Assessment and Plan   Diagnoses and all orders for this visit:    1. Lymphadenopathy (Primary)    2. Flank pain  -     POCT " urinalysis dipstick, automated  -     Urine Culture - Urine, Urine, Clean Catch; Future      After evaluation I explained to the patient that I felt that the nodule is a lymph node.  I did explain I wanted to watch and wait to see if there is any change.  I explained to the patient that we would touch base with her at the end of the week.  If the area remains tender and enlarged that we would empirically treat her with antibiotics to see if there is any response.  I also explained that there was no response and remained that we would move forward with an ultrasound.    Follow Up   Return if symptoms worsen or fail to improve.  Patient was given instructions and counseling regarding her condition or for health maintenance advice. Please see specific information pulled into the AVS if appropriate.

## 2024-07-26 ENCOUNTER — TELEPHONE (OUTPATIENT)
Dept: FAMILY MEDICINE CLINIC | Facility: CLINIC | Age: 34
End: 2024-07-26
Payer: COMMERCIAL

## 2024-07-26 NOTE — TELEPHONE ENCOUNTER
Caller: ElkeAilyn     Relationship: SELF    Best call back number: [phone]     What is your medical concern? PATIENT WAS SEEN IN OFFICE ON 07.23.2024. PATIENT WAS ADVISED TO WAIT A COUPLE OF DAYS AND CONTACT THE OFFICE TO PROVIDE AN UPDATE ON HER CURRENT MEDICAL CONDITION. PATENT HAS SWOLLEN NODULE IN GROIN, IT ISN'T AS TENDER AS IT WAS BEFORE, IT HAS DECREASED IN SIZE. PATIENT STATES SHE CAN STILL FEEL IT BUT IT ISN'T AS BAD AS BEFORE. PATIENT UNSURE IF ANTIBIOTIC SHOULD BE CALLED IN OR NOT.    How long has this issue been going on? 07.23.2024    Is your provider already aware of this issue? YES    Have you been treated for this issue? YES

## 2024-12-12 ENCOUNTER — LAB (OUTPATIENT)
Dept: LAB | Facility: HOSPITAL | Age: 34
End: 2024-12-12
Payer: COMMERCIAL

## 2024-12-12 ENCOUNTER — OFFICE VISIT (OUTPATIENT)
Dept: FAMILY MEDICINE CLINIC | Facility: CLINIC | Age: 34
End: 2024-12-12
Payer: COMMERCIAL

## 2024-12-12 VITALS
DIASTOLIC BLOOD PRESSURE: 72 MMHG | SYSTOLIC BLOOD PRESSURE: 118 MMHG | HEIGHT: 69 IN | BODY MASS INDEX: 22.51 KG/M2 | TEMPERATURE: 97.3 F | WEIGHT: 152 LBS | OXYGEN SATURATION: 100 % | HEART RATE: 101 BPM | RESPIRATION RATE: 18 BRPM

## 2024-12-12 DIAGNOSIS — R59.0 LYMPHADENOPATHY, CERVICAL: ICD-10-CM

## 2024-12-12 DIAGNOSIS — R59.0 LYMPHADENOPATHY, CERVICAL: Primary | ICD-10-CM

## 2024-12-12 LAB
ALBUMIN SERPL-MCNC: 4.2 G/DL (ref 3.5–5.2)
ALBUMIN/GLOB SERPL: 1.4 G/DL
ALP SERPL-CCNC: 55 U/L (ref 39–117)
ALT SERPL W P-5'-P-CCNC: 9 U/L (ref 1–33)
ANION GAP SERPL CALCULATED.3IONS-SCNC: 10.6 MMOL/L (ref 5–15)
AST SERPL-CCNC: 12 U/L (ref 1–32)
BILIRUB SERPL-MCNC: 0.4 MG/DL (ref 0–1.2)
BUN SERPL-MCNC: 12 MG/DL (ref 6–20)
BUN/CREAT SERPL: 13.5 (ref 7–25)
CALCIUM SPEC-SCNC: 9.4 MG/DL (ref 8.6–10.5)
CHLORIDE SERPL-SCNC: 102 MMOL/L (ref 98–107)
CO2 SERPL-SCNC: 26.4 MMOL/L (ref 22–29)
CREAT SERPL-MCNC: 0.89 MG/DL (ref 0.57–1)
DEPRECATED RDW RBC AUTO: 35.9 FL (ref 37–54)
EGFRCR SERPLBLD CKD-EPI 2021: 88.5 ML/MIN/1.73
ERYTHROCYTE [DISTWIDTH] IN BLOOD BY AUTOMATED COUNT: 11.1 % (ref 12.3–15.4)
GLOBULIN UR ELPH-MCNC: 2.9 GM/DL
GLUCOSE SERPL-MCNC: 90 MG/DL (ref 65–99)
HCT VFR BLD AUTO: 41.6 % (ref 34–46.6)
HGB BLD-MCNC: 13.9 G/DL (ref 12–15.9)
MCH RBC QN AUTO: 30 PG (ref 26.6–33)
MCHC RBC AUTO-ENTMCNC: 33.4 G/DL (ref 31.5–35.7)
MCV RBC AUTO: 89.7 FL (ref 79–97)
PLATELET # BLD AUTO: 302 10*3/MM3 (ref 140–450)
PMV BLD AUTO: 10 FL (ref 6–12)
POTASSIUM SERPL-SCNC: 4.4 MMOL/L (ref 3.5–5.2)
PROT SERPL-MCNC: 7.1 G/DL (ref 6–8.5)
RBC # BLD AUTO: 4.64 10*6/MM3 (ref 3.77–5.28)
SODIUM SERPL-SCNC: 139 MMOL/L (ref 136–145)
WBC NRBC COR # BLD AUTO: 5.93 10*3/MM3 (ref 3.4–10.8)

## 2024-12-12 PROCEDURE — 80053 COMPREHEN METABOLIC PANEL: CPT

## 2024-12-12 PROCEDURE — 99213 OFFICE O/P EST LOW 20 MIN: CPT | Performed by: NURSE PRACTITIONER

## 2024-12-12 PROCEDURE — 85027 COMPLETE CBC AUTOMATED: CPT

## 2024-12-12 PROCEDURE — 36415 COLL VENOUS BLD VENIPUNCTURE: CPT

## 2024-12-12 RX ORDER — AMOXICILLIN 500 MG/1
500 CAPSULE ORAL 3 TIMES DAILY
Qty: 30 CAPSULE | Refills: 0 | Status: SHIPPED | OUTPATIENT
Start: 2024-12-12

## 2024-12-12 NOTE — PROGRESS NOTES
Chief Complaint  Neck Pain and Mass (Feels it is on the vein right side)    Subjective        Ailyn Bedoya presents to [facility]  History of Present Illness  Neck pain with area that is a lymph node that is very tender to touch.  Had an enlarged lymph node in the groin 6 months ago as well.  Neck Pain   Pertinent negatives include no chest pain, fever, numbness or weakness.       The following portions of the patient's history were personally reviewed and updated as appropriate: allergies, current medications, past medical history, past surgical history, past family history, and past social history.     Body mass index is 22.44 kg/m².    BMI is within normal parameters. No other follow-up for BMI required.      Past History:    Medical History: has a past medical history of Hx of Migraine withOUT aura, Hx of Preeclampsia (), IBS (irritable bowel syndrome), Mild intermittent asthma - dx as a child, no meds, Ovarian cyst, Scoliosis, Urinary tract infection, and Vitamin D deficiency (2022).     Surgical History: has a past surgical history that includes  section; Dilation and curettage of uterus; and  section (N/A, 2022).     Family History: family history includes Melanoma in her paternal grandfather; No Known Problems in her father; Prostate cancer in her paternal grandfather.     Social History: reports that she has never smoked. She has never been exposed to tobacco smoke. She has never used smokeless tobacco. She reports that she does not drink alcohol and does not use drugs.    Allergies: Patient has no known allergies.          Current Outpatient Medications:     amoxicillin (AMOXIL) 500 MG capsule, Take 1 capsule by mouth 3 (Three) Times a Day., Disp: 30 capsule, Rfl: 0    There are no discontinued medications.      Review of Systems   Constitutional:  Negative for fever.   Respiratory:  Negative for cough and shortness of breath.   "  Cardiovascular:  Negative for chest pain, palpitations and leg swelling.   Musculoskeletal:  Positive for neck pain.   Neurological:  Negative for dizziness, weakness, numbness and headache.        Objective         Vitals:    12/12/24 1012   BP: 118/72   BP Location: Right arm   Patient Position: Sitting   Cuff Size: Adult   Pulse: 101   Resp: 18   Temp: 97.3 °F (36.3 °C)   TempSrc: Temporal   SpO2: 100%   Weight: 68.9 kg (152 lb)   Height: 175.3 cm (69.02\")     Body mass index is 22.44 kg/m².         Physical Exam  Vitals reviewed.   Constitutional:       Appearance: Normal appearance. She is well-developed.   HENT:      Head: Normocephalic and atraumatic.      Mouth/Throat:      Pharynx: No oropharyngeal exudate.   Eyes:      Conjunctiva/sclera: Conjunctivae normal.      Pupils: Pupils are equal, round, and reactive to light.   Cardiovascular:      Rate and Rhythm: Normal rate and regular rhythm.      Heart sounds: Normal heart sounds. No murmur heard.     No friction rub. No gallop.   Pulmonary:      Effort: Pulmonary effort is normal.      Breath sounds: Normal breath sounds. No wheezing or rhonchi.   Skin:     General: Skin is warm and dry.   Neurological:      Mental Status: She is alert and oriented to person, place, and time.   Psychiatric:         Mood and Affect: Mood and affect normal.         Behavior: Behavior normal.         Thought Content: Thought content normal.         Judgment: Judgment normal.             Result Review :               Assessment and Plan     Diagnoses and all orders for this visit:    1. Lymphadenopathy, cervical (Primary)  -     CBC (No Diff); Future  -     Comprehensive Metabolic Panel; Future  -     US Head Neck Soft Tissue; Future  -     amoxicillin (AMOXIL) 500 MG capsule; Take 1 capsule by mouth 3 (Three) Times a Day.  Dispense: 30 capsule; Refill: 0              Follow Up     Return for Next scheduled follow up.    Patient was given instructions and counseling regarding " her condition or for health maintenance advice. Please see specific information pulled into the AVS if appropriate.

## 2024-12-13 ENCOUNTER — TELEPHONE (OUTPATIENT)
Dept: FAMILY MEDICINE CLINIC | Facility: CLINIC | Age: 34
End: 2024-12-13
Payer: COMMERCIAL

## 2024-12-13 NOTE — TELEPHONE ENCOUNTER
Caller: Ailyn Bedoya    Relationship: Self    Best call back number: [phone]    What test was performed: BLOOD WORK     When was the test performed: 12.12.24    Where was the test performed: [facility]

## 2024-12-16 ENCOUNTER — OFFICE VISIT (OUTPATIENT)
Dept: FAMILY MEDICINE CLINIC | Facility: CLINIC | Age: 34
End: 2024-12-16
Payer: COMMERCIAL

## 2024-12-16 VITALS
OXYGEN SATURATION: 98 % | TEMPERATURE: 98 F | WEIGHT: 157.8 LBS | HEIGHT: 69 IN | SYSTOLIC BLOOD PRESSURE: 142 MMHG | HEART RATE: 99 BPM | DIASTOLIC BLOOD PRESSURE: 70 MMHG | BODY MASS INDEX: 23.37 KG/M2

## 2024-12-16 DIAGNOSIS — J06.9 VIRAL UPPER RESPIRATORY TRACT INFECTION: Primary | ICD-10-CM

## 2024-12-16 DIAGNOSIS — R59.0 LYMPHADENOPATHY, CERVICAL: ICD-10-CM

## 2024-12-16 PROCEDURE — 99213 OFFICE O/P EST LOW 20 MIN: CPT

## 2024-12-16 RX ORDER — PREDNISONE 20 MG/1
20 TABLET ORAL 2 TIMES DAILY
Qty: 10 TABLET | Refills: 0 | Status: SHIPPED | OUTPATIENT
Start: 2024-12-16

## 2024-12-16 NOTE — PROGRESS NOTES
Ailyn Bedoya presents to [facility] with complaints of concerns for persistent lymphadenopathy, extreme fatigue, weakness.      History of Present Illness  This is a 34-year-old female who presents to clinic with complaints of concern for persistent lymphadenopathy, extreme fatigue, and weakness.    Patient states that she is actually had this lymphadenopathy now for about a month.  Patient states that it kind of started out of nowhere, she thought it was more related to her flareup of allergies and thought that it would resolve on her own.  Patient states that it then persisted and she came in for evaluation last Thursday and seen another provider in this office.  They did do some workup, ordered blood work, ordered an ultrasound which did reveal 1 lymph node was enlarged likely inflammatory process, and prescribed amoxicillin for this.  States that she then over the weekend thought she was doing a little bit better until she woke up this morning and actually felt worse.  Patient states that she is been extremely fatigued, she does still have a sore throat, and she felt like she should be making improvements with the amoxicillin but states that she has not noticed any improvement in the lymphadenopathy.  Patient states she was tested for mono at urgent care this morning, ended up going to urgent care because she just did not feel right and felt dizzy and wanted to make sure that nothing else was going on and her blood pressure was a little bit higher so she was slightly concerned.  Denies any other major issues, denies any GI issues, no fever.  States that her monotest was negative.    The following portions of the patient's history were personally reviewed and updated as appropriate: allergies, current medications, past medical history, past surgical history, past family history, and past social history.       Objective   Vital Signs:   /70 (BP Location: Left arm, Patient  "Position: Sitting, Cuff Size: Adult)   Pulse 99   Temp 98 °F (36.7 °C)   Ht 175.3 cm (69.02\")   Wt 71.6 kg (157 lb 12.8 oz)   SpO2 98%   BMI 23.29 kg/m²     Body mass index is 23.29 kg/m².    All labs, imaging, test results, and specialty provider notes reviewed with patient.     Physical Exam  Vitals reviewed.   Constitutional:       Appearance: Normal appearance.   Cardiovascular:      Rate and Rhythm: Normal rate and regular rhythm.      Pulses: Normal pulses.      Heart sounds: Normal heart sounds.   Pulmonary:      Effort: Pulmonary effort is normal.      Breath sounds: Normal breath sounds.   Neurological:      General: No focal deficit present.      Mental Status: She is alert and oriented to person, place, and time.                  BMI is within normal parameters. No other follow-up for BMI required.            Assessment and Plan:  Diagnoses and all orders for this visit:    1. Viral upper respiratory tract infection (Primary)  -     predniSONE (DELTASONE) 20 MG tablet; Take 1 tablet by mouth 2 (Two) Times a Day.  Dispense: 10 tablet; Refill: 0    2. Lymphadenopathy, cervical  -     predniSONE (DELTASONE) 20 MG tablet; Take 1 tablet by mouth 2 (Two) Times a Day.  Dispense: 10 tablet; Refill: 0      Feel like this is more viral in origin, discussed with patient that lymphadenopathy can maintain for several weeks even after treating the issue.  Will go ahead and prescribe a course of prednisone to try to help with the inflammation, could consider other testing such as blood draw for mono/CMV if still persist several weeks after full treatment.    Follow Up:  No follow-ups on file.    Patient was given instructions and counseling regarding her condition or for health maintenance advice. Please see specific information pulled into the AVS if appropriate.         "

## 2024-12-16 NOTE — TELEPHONE ENCOUNTER
Pt viewed message sent by Logan in [employer]. She is currently at the  and is scheduled for a visit today at [facility] with Niki.

## 2024-12-18 ENCOUNTER — APPOINTMENT (OUTPATIENT)
Dept: CT IMAGING | Facility: HOSPITAL | Age: 34
End: 2024-12-18
Payer: COMMERCIAL

## 2024-12-18 ENCOUNTER — HOSPITAL ENCOUNTER (EMERGENCY)
Facility: HOSPITAL | Age: 34
Discharge: HOME | End: 2024-12-18
Attending: EMERGENCY MEDICINE
Payer: COMMERCIAL

## 2024-12-18 VITALS
WEIGHT: 158.51 LBS | SYSTOLIC BLOOD PRESSURE: 139 MMHG | OXYGEN SATURATION: 100 % | HEIGHT: 69 IN | RESPIRATION RATE: 16 BRPM | BODY MASS INDEX: 23.48 KG/M2 | HEART RATE: 105 BPM | DIASTOLIC BLOOD PRESSURE: 82 MMHG | TEMPERATURE: 98.6 F

## 2024-12-18 DIAGNOSIS — G43.909 ACUTE MIGRAINE: ICD-10-CM

## 2024-12-18 DIAGNOSIS — R59.1 LYMPHADENOPATHY OF HEAD AND NECK: Primary | ICD-10-CM

## 2024-12-18 LAB
ALBUMIN SERPL-MCNC: 4.7 G/DL (ref 3.5–5.2)
ALBUMIN/GLOB SERPL: 1.9 G/DL
ALP SERPL-CCNC: 55 U/L (ref 39–117)
ALT SERPL W P-5'-P-CCNC: 10 U/L (ref 1–33)
ANION GAP SERPL CALCULATED.3IONS-SCNC: 10.3 MMOL/L (ref 5–15)
AST SERPL-CCNC: 16 U/L (ref 1–32)
BASOPHILS # BLD AUTO: 0.05 10*3/MM3 (ref 0–0.2)
BASOPHILS NFR BLD AUTO: 0.4 % (ref 0–1.5)
BILIRUB SERPL-MCNC: 0.4 MG/DL (ref 0–1.2)
BUN SERPL-MCNC: 14 MG/DL (ref 6–20)
BUN/CREAT SERPL: 18.2 (ref 7–25)
CALCIUM SPEC-SCNC: 9.4 MG/DL (ref 8.6–10.5)
CHLORIDE SERPL-SCNC: 101 MMOL/L (ref 98–107)
CO2 SERPL-SCNC: 25.7 MMOL/L (ref 22–29)
CREAT SERPL-MCNC: 0.77 MG/DL (ref 0.57–1)
DEPRECATED RDW RBC AUTO: 43 FL (ref 37–54)
EGFRCR SERPLBLD CKD-EPI 2021: 105.3 ML/MIN/1.73
EOSINOPHIL # BLD AUTO: 0 10*3/MM3 (ref 0–0.4)
EOSINOPHIL NFR BLD AUTO: 0 % (ref 0.3–6.2)
ERYTHROCYTE [DISTWIDTH] IN BLOOD BY AUTOMATED COUNT: 12.6 % (ref 12.3–15.4)
FLUAV SUBTYP SPEC NAA+PROBE: NOT DETECTED
FLUBV RNA ISLT QL NAA+PROBE: NOT DETECTED
GLOBULIN UR ELPH-MCNC: 2.5 GM/DL
GLUCOSE SERPL-MCNC: 105 MG/DL (ref 65–99)
HCT VFR BLD AUTO: 42.1 % (ref 34–46.6)
HETEROPH AB SER QL LA: NEGATIVE
HGB BLD-MCNC: 13.2 G/DL (ref 12–15.9)
IMM GRANULOCYTES # BLD AUTO: 0.05 10*3/MM3 (ref 0–0.05)
IMM GRANULOCYTES NFR BLD AUTO: 0.4 % (ref 0–0.5)
LYMPHOCYTES # BLD AUTO: 2.34 10*3/MM3 (ref 0.7–3.1)
LYMPHOCYTES NFR BLD AUTO: 18.9 % (ref 19.6–45.3)
MCH RBC QN AUTO: 29.1 PG (ref 26.6–33)
MCHC RBC AUTO-ENTMCNC: 31.4 G/DL (ref 31.5–35.7)
MCV RBC AUTO: 92.9 FL (ref 79–97)
MONOCYTES # BLD AUTO: 0.51 10*3/MM3 (ref 0.1–0.9)
MONOCYTES NFR BLD AUTO: 4.1 % (ref 5–12)
NEUTROPHILS NFR BLD AUTO: 76.2 % (ref 42.7–76)
NEUTROPHILS NFR BLD AUTO: 9.46 10*3/MM3 (ref 1.7–7)
NRBC BLD AUTO-RTO: 0 /100 WBC (ref 0–0.2)
PLATELET # BLD AUTO: 335 10*3/MM3 (ref 140–450)
PMV BLD AUTO: 9.8 FL (ref 6–12)
POTASSIUM SERPL-SCNC: 3.9 MMOL/L (ref 3.5–5.2)
PROT SERPL-MCNC: 7.2 G/DL (ref 6–8.5)
RBC # BLD AUTO: 4.53 10*6/MM3 (ref 3.77–5.28)
RSV RNA NPH QL NAA+NON-PROBE: NOT DETECTED
S PYO AG THROAT QL: NEGATIVE
SARS-COV-2 RNA RESP QL NAA+PROBE: NOT DETECTED
SODIUM SERPL-SCNC: 137 MMOL/L (ref 136–145)
WBC NRBC COR # BLD AUTO: 12.41 10*3/MM3 (ref 3.4–10.8)

## 2024-12-18 PROCEDURE — 96375 TX/PRO/DX INJ NEW DRUG ADDON: CPT

## 2024-12-18 PROCEDURE — 96374 THER/PROPH/DIAG INJ IV PUSH: CPT

## 2024-12-18 PROCEDURE — 86308 HETEROPHILE ANTIBODY SCREEN: CPT | Performed by: EMERGENCY MEDICINE

## 2024-12-18 PROCEDURE — 87081 CULTURE SCREEN ONLY: CPT | Performed by: EMERGENCY MEDICINE

## 2024-12-18 PROCEDURE — 70491 CT SOFT TISSUE NECK W/DYE: CPT

## 2024-12-18 PROCEDURE — 25010000002 DIPHENHYDRAMINE PER 50 MG

## 2024-12-18 PROCEDURE — 25510000001 IOPAMIDOL PER 1 ML

## 2024-12-18 PROCEDURE — 25010000002 KETOROLAC TROMETHAMINE PER 15 MG

## 2024-12-18 PROCEDURE — 80053 COMPREHEN METABOLIC PANEL: CPT | Performed by: EMERGENCY MEDICINE

## 2024-12-18 PROCEDURE — 87880 STREP A ASSAY W/OPTIC: CPT | Performed by: EMERGENCY MEDICINE

## 2024-12-18 PROCEDURE — 99285 EMERGENCY DEPT VISIT HI MDM: CPT

## 2024-12-18 PROCEDURE — 25010000002 DEXAMETHASONE SODIUM PHOSPHATE 10 MG/ML SOLUTION

## 2024-12-18 PROCEDURE — 87637 SARSCOV2&INF A&B&RSV AMP PRB: CPT | Performed by: EMERGENCY MEDICINE

## 2024-12-18 PROCEDURE — 25010000002 DEXAMETHASONE PER 1 MG

## 2024-12-18 PROCEDURE — 25010000002 METOCLOPRAMIDE PER 10 MG

## 2024-12-18 PROCEDURE — 85025 COMPLETE CBC W/AUTO DIFF WBC: CPT | Performed by: EMERGENCY MEDICINE

## 2024-12-18 RX ORDER — METOCLOPRAMIDE 5 MG/1
10 TABLET ORAL 3 TIMES DAILY PRN
Qty: 20 TABLET | Refills: 0 | Status: SHIPPED | OUTPATIENT
Start: 2024-12-18

## 2024-12-18 RX ORDER — METOCLOPRAMIDE HYDROCHLORIDE 5 MG/ML
10 INJECTION INTRAMUSCULAR; INTRAVENOUS ONCE
Status: COMPLETED | OUTPATIENT
Start: 2024-12-18 | End: 2024-12-18

## 2024-12-18 RX ORDER — IOPAMIDOL 755 MG/ML
100 INJECTION, SOLUTION INTRAVASCULAR
Status: COMPLETED | OUTPATIENT
Start: 2024-12-18 | End: 2024-12-18

## 2024-12-18 RX ORDER — KETOROLAC TROMETHAMINE 30 MG/ML
30 INJECTION, SOLUTION INTRAMUSCULAR; INTRAVENOUS ONCE
Status: COMPLETED | OUTPATIENT
Start: 2024-12-18 | End: 2024-12-18

## 2024-12-18 RX ORDER — DEXAMETHASONE SODIUM PHOSPHATE 10 MG/ML
10 INJECTION, SOLUTION INTRAMUSCULAR; INTRAVENOUS ONCE
Status: COMPLETED | OUTPATIENT
Start: 2024-12-18 | End: 2024-12-18

## 2024-12-18 RX ORDER — DIPHENHYDRAMINE HYDROCHLORIDE 50 MG/ML
25 INJECTION INTRAMUSCULAR; INTRAVENOUS ONCE
Status: COMPLETED | OUTPATIENT
Start: 2024-12-18 | End: 2024-12-18

## 2024-12-18 RX ORDER — DEXAMETHASONE SODIUM PHOSPHATE 4 MG/ML
10 INJECTION, SOLUTION INTRA-ARTICULAR; INTRALESIONAL; INTRAMUSCULAR; INTRAVENOUS; SOFT TISSUE ONCE
Status: COMPLETED | OUTPATIENT
Start: 2024-12-18 | End: 2024-12-18

## 2024-12-18 RX ADMIN — KETOROLAC TROMETHAMINE 30 MG: 30 INJECTION, SOLUTION INTRAMUSCULAR; INTRAVENOUS at 19:54

## 2024-12-18 RX ADMIN — METOCLOPRAMIDE 10 MG: 5 INJECTION, SOLUTION INTRAMUSCULAR; INTRAVENOUS at 19:52

## 2024-12-18 RX ADMIN — IOPAMIDOL 75 ML: 755 INJECTION, SOLUTION INTRAVENOUS at 17:44

## 2024-12-18 RX ADMIN — DEXAMETHASONE SODIUM PHOSPHATE 10 MG: 4 INJECTION, SOLUTION INTRAMUSCULAR; INTRAVENOUS at 19:53

## 2024-12-18 RX ADMIN — DIPHENHYDRAMINE HYDROCHLORIDE 25 MG: 50 INJECTION, SOLUTION INTRAMUSCULAR; INTRAVENOUS at 19:52

## 2024-12-18 RX ADMIN — DEXAMETHASONE SODIUM PHOSPHATE 10 MG: 10 INJECTION INTRAMUSCULAR; INTRAVENOUS at 20:19

## 2024-12-18 NOTE — ED PROVIDER NOTES
Time: 4:51 PM EST  Date of encounter:  2024  Independent Historian/Clinical History and Information was obtained by:   Patient    History is limited by: N/A    Chief Complaint   Patient presents with    Neck Pain    Swollen Glands         History of Present Illness:  Patient is a 34 y.o. year old female who presents to the emergency department for evaluation of neck pain.  Patient reports that she began having right-sided neck swelling and was treated at urgent care with amoxicillin and prednisone.  Has had several doses but still having pain and swelling, now having left-sided pain which radiates up into her head.  Slightly sore throat and pain with swallowing.  Denies fever, chills, vomiting, shortness of breath, chest pain.  (Provider in triage, Zoraida Valadez PA-C)    Patient Care Team  Primary Care Provider: Lazarus Luevano APRN    Past Medical History:     No Known Allergies  Past Medical History:   Diagnosis Date    Hx of Migraine withOUT aura     Hx of Preeclampsia     IBS (irritable bowel syndrome)     Mild intermittent asthma - dx as a child, no meds     Ovarian cyst     Scoliosis     Urinary tract infection     Vitamin D deficiency 2022     Past Surgical History:   Procedure Laterality Date     SECTION      LTCS 2019 Preen 33+5 NRFHR, emergent     SECTION N/A 2022    Procedure:  SECTION REPEAT;  Surgeon: Beata Castillo DO;  Location: [facility] LABOR DELIVERY;  Service: Obstetrics/Gynecology;  Laterality: N/A;    DILATATION AND CURETTAGE       Preen MAB, 6weeks by US, 9weeks by LMP     Family History   Problem Relation Age of Onset    No Known Problems Father     Prostate cancer Paternal Grandfather     Melanoma Paternal Grandfather     Ovarian cancer Neg Hx     Uterine cancer Neg Hx     Breast cancer Neg Hx     Colon cancer Neg Hx     Clotting disorder Neg Hx     Deep vein thrombosis Neg Hx     Pulmonary embolism Neg Hx        Home Medications:  Prior to Admission  "medications    Medication Sig Start Date End Date Taking? Authorizing Provider   amoxicillin (AMOXIL) 500 MG capsule Take 1 capsule by mouth 3 (Three) Times a Day. 12/12/24   Logan Elias APRN   predniSONE (DELTASONE) 20 MG tablet Take 1 tablet by mouth 2 (Two) Times a Day. 12/16/24   Niki Go APRN        Social History:   Social History     Tobacco Use    Smoking status: Never     Passive exposure: Never    Smokeless tobacco: Never   Vaping Use    Vaping status: Never Used   Substance Use Topics    Alcohol use: No    Drug use: No         Review of Systems:  Review of Systems     Physical Exam:  /82 (BP Location: Right arm, Patient Position: Sitting)   Pulse 105   Temp 98.6 °F (37 °C) (Oral)   Resp 16   Ht 175.3 cm (69\")   Wt 71.9 kg (158 lb 8.2 oz)   LMP 12/02/2024 (Approximate)   SpO2 100%   BMI 23.41 kg/m²         Physical Exam  HENT:      Head: Normocephalic.      Mouth/Throat:      Mouth: Mucous membranes are moist.      Pharynx: Oropharyngeal exudate and posterior oropharyngeal erythema present.   Eyes:      Pupils: Pupils are equal, round, and reactive to light.   Pulmonary:      Effort: Pulmonary effort is normal.   Abdominal:      General: There is no distension.   Musculoskeletal:      Cervical back: Neck supple.   Skin:     General: Skin is warm and dry.   Neurological:      General: No focal deficit present.      Mental Status: She is alert and oriented to person, place, and time.   Psychiatric:         Mood and Affect: Mood normal.         Behavior: Behavior normal.        ***                    Medical Decision Making:      Comorbidities that affect care:    {Comorbidities that affect care:26886}    External Notes reviewed:    {External Note review (Optional):98975}      The following orders were placed and all results were independently analyzed by me:  Orders Placed This Encounter   Procedures    Rapid Strep A Screen - Swab, Throat    COVID PRE-OP / PRE-PROCEDURE SCREENING " ORDER (NO ISOLATION) - Swab, Nasopharynx    COVID-19, FLU A/B, RSV PCR 1 HR TAT - Swab, Nasopharynx    Comprehensive Metabolic Panel    Mononucleosis Screen    CBC Auto Differential    CBC & Differential       Medications Given in the Emergency Department:  Medications - No data to display     ED Course:    The patient was initially evaluated in the triage area where orders were placed. The patient was later dispositioned by Lyly Valadez PA-C.      The patient was advised to stay for completion of workup which includes but is not limited to communication of labs and radiological results, reassessment and plan. The patient was advised that leaving prior to disposition by a provider could result in critical findings that are not communicated to the patient.     ED Course as of 12/18/24 1651   Wed Dec 18, 2024   1651 PROVIDER IN TRIAGE  Patient was evaluated by me in triage Lyly Valadez PA-C.  Orders are placed and patient is currently awaiting final results and disposition.  [AS]      ED Course User Index  [AS] Lyly Valadez PA-C       Labs:    Lab Results (last 24 hours)       ** No results found for the last 24 hours. **             Imaging:    No Radiology Exams Resulted Within Past 24 Hours      Differential Diagnosis and Discussion:      {Differentials:02260}    PROCEDURES:    {Independent Review of (Optional):24657}    No orders to display        Procedures    MDM         {CRITICAL CARE:58101}      {SEPSIS RECOGNITION:68004}    Patient Care Considerations:    {Considerations (Optional):38105}      Consultants/Shared Management Plan:    {Shared Management Plan (Optional):07103}    Social Determinants of Health:    {Social Determinants of Health (Optional):34384}      Disposition and Care Coordination:    {Admission consideration:48763}    {Discharge (Optional):49408}    Final diagnoses:   None        ED Disposition       None            This medical record created using voice recognition  software.           Management Plan:    None    Social Determinants of Health:    Patient has presented with family members who are responsible, reliable and will ensure follow up care.      Disposition and Care Coordination:    Discharged: The patient is suitable and stable for discharge with no need for consideration of admission.    I have explained the patient´s condition, diagnoses and treatment plan based on the information available to me at this time. I have answered questions and addressed any concerns. The patient has a good  understanding of the patient´s diagnosis, condition, and treatment plan as can be expected at this point. The vital signs have been stable. The patient´s condition is stable and appropriate for discharge from the emergency department.      The patient will pursue further outpatient evaluation with the primary care physician or other designated or consulting physician as outlined in the discharge instructions. They are agreeable to this plan of care and follow-up instructions have been explained in detail. The patient has received these instructions in written format and has expressed an understanding of the discharge instructions. The patient is aware that any significant change in condition or worsening of symptoms should prompt an immediate return to this or the closest emergency department or call to 911.  I have explained discharge medications and the need for follow up with the patient/caretakers. This was also printed in the discharge instructions. Patient was discharged with the following medications and follow up:      Medication List        New Prescriptions      metoclopramide 5 MG tablet  Commonly known as: REGLAN  Take 2 tablets by mouth 3 (Three) Times a Day As Needed (nausea).               Where to Get Your Medications        These medications were sent to [id number] - [place], [place] - [address] AT [facility] .[phone] [facility] [phone] FX   [address] [place] [address]      Phone: [phone]   metoclopramide 5 MG tablet      Lazarus Luevano, APRN  [address]  [address]  [place] [address]  [phone]    Schedule an appointment as soon as possible for a visit       Beata Castillo DO  [address] [place] [address]  [phone]             Final diagnoses:   Lymphadenopathy of head and neck   Acute migraine        ED Disposition       ED Disposition   Discharge    Condition   Stable    Comment   --               This medical record created using voice recognition software.             Jefferson Maciel PA-C  12/24/24 1620       Jefferson Maciel PA-C  12/24/24 1626

## 2024-12-19 NOTE — DISCHARGE INSTRUCTIONS
Thank you for allowing us to provide care for you today.  Your workup today included blood work and CT imaging.  Your CT imaging revealed a prominent but nonenlarged lymph node that are likely reactive to ongoing respiratory symptoms.  As discussed, I will have you follow-up with your primary care provider in the next 7 days for follow-up from the ED. please begin taking Reglan combination with Excedrin migraine 25 mg Benadryl's at home as needed for acute migraine attacks.  Please follow-up with your gynecologist as needed for ongoing acute adenopathy with breast masses present.  Please follow-up in the future as well with dermatology for psoriatic workup.  Thank you

## 2024-12-20 LAB — BACTERIA SPEC AEROBE CULT: NORMAL

## 2024-12-30 NOTE — PROGRESS NOTES
"GYN Visit    Chief Complaint   Patient presents with    Follow-up     Lymph nodes and breast tenderness       HPI:   34 y.o. LMP: Patient's last menstrual period was 2024 (approximate).     Social History     Substance and Sexual Activity   Sexual Activity Yes    Partners: Male    Birth control/protection: None   IgP, Aptima HPV (2023 15:44) Pap negative, HPV negative    ED Provider Notes by Jefferson Maciel PA-C (2024 16:51)  History of preeclampsia and  x 2  Seen in the emergency room on  and recommend follow-up with PCP/GYN for ongoing breast \"masses\"    She has not been feeling any breast masses however she does note tenderness on her right breast for several months.  It is not only just with her menstrual cycle it can be other times.  She has not felt any masses in her breasts.  She denies any nipple discharge.  She has no family history of breast cancer.  Her PCP has been working her up for persistent cervical lymphadenopathy.    History: PMHx, Meds, Allergies, PSHx, Social Hx, and POBHx all reviewed and updated.    PHYSICAL EXAM:  /80   Pulse 85   Ht 175.3 cm (69\")   Wt 71.2 kg (157 lb)   LMP 2024 (Approximate)   BMI 23.18 kg/m²   Facility age limit for growth %karlee is 20 years.   Chaperone present during breast and/or pelvic exam if performed.  General- NAD, alert and oriented, appropriate  Psych- Normal mood, good memory    Breast left-  Bilaterally symmetrical, no masses, non tender, no nipple discharge, no axillary or supraclavicular nodes palpable.   Breast right- Bilaterally symmetrical, no masses, non tender, no nipple discharge, no axillary or supraclavicular nodes palpable.       ASSESSMENT AND PLAN:  Diagnoses and all orders for this visit:    1. Mastalgia (Primary)  Comments:  right side, w increased lymphadenopathy cervical chain by report  Orders:  -     Mammo Diagnostic Digital Tomosynthesis Bilateral With CAD; Future  -     US Breast " Right Limited; Future    Reassurance provided, no obvious abnormality palpable today.  Recommend continue self breast exams on a monthly basis the week or so after her menstrual cycle.    MASTALGIA discussed.  We discussed importance of a support bra and avoiding underwire bra.  Limit caffeine, fat and salt intake.  NSAIDs.  OTC evening primrose oil 3656-5114 mg daily is an option.   Vitamin E 400U QD or BID over-the-counter.       Follow Up:  Return if symptoms worsen or fail to improve AND WWE 1year.          Beata Castillo,   12/31/2024    [facility]  [facility] [place] [address]  Dept: [phone]  Dept Fax: [phone]  Loc: [phone]  Loc Fax: [phone]

## 2024-12-31 ENCOUNTER — TELEPHONE (OUTPATIENT)
Dept: FAMILY MEDICINE CLINIC | Facility: CLINIC | Age: 34
End: 2024-12-31
Payer: COMMERCIAL

## 2024-12-31 ENCOUNTER — OFFICE VISIT (OUTPATIENT)
Dept: OBSTETRICS AND GYNECOLOGY | Facility: CLINIC | Age: 34
End: 2024-12-31
Payer: COMMERCIAL

## 2024-12-31 VITALS
DIASTOLIC BLOOD PRESSURE: 80 MMHG | SYSTOLIC BLOOD PRESSURE: 125 MMHG | HEART RATE: 85 BPM | BODY MASS INDEX: 23.25 KG/M2 | HEIGHT: 69 IN | WEIGHT: 157 LBS

## 2024-12-31 DIAGNOSIS — N64.4 MASTALGIA: Primary | ICD-10-CM

## 2024-12-31 NOTE — TELEPHONE ENCOUNTER
Patient called and said her lymph nodes are still swollen and would like to know what she needs to do.  They do seem better but still swollen.

## 2025-01-21 ENCOUNTER — HOSPITAL ENCOUNTER (OUTPATIENT)
Dept: ULTRASOUND IMAGING | Facility: HOSPITAL | Age: 35
Discharge: HOME | End: 2025-01-21
Payer: COMMERCIAL

## 2025-01-21 ENCOUNTER — HOSPITAL ENCOUNTER (OUTPATIENT)
Dept: MAMMOGRAPHY | Facility: HOSPITAL | Age: 35
Discharge: HOME | End: 2025-01-21
Payer: COMMERCIAL

## 2025-01-21 DIAGNOSIS — N64.4 MASTALGIA: ICD-10-CM

## 2025-01-21 PROCEDURE — 77066 DX MAMMO INCL CAD BI: CPT

## 2025-01-21 PROCEDURE — G0279 TOMOSYNTHESIS, MAMMO: HCPCS

## 2025-03-12 ENCOUNTER — HOSPITAL ENCOUNTER (EMERGENCY)
Facility: HOSPITAL | Age: 35
Discharge: HOME | End: 2025-03-12
Attending: EMERGENCY MEDICINE
Payer: COMMERCIAL

## 2025-03-12 VITALS
RESPIRATION RATE: 18 BRPM | DIASTOLIC BLOOD PRESSURE: 60 MMHG | WEIGHT: 154.76 LBS | TEMPERATURE: 98 F | BODY MASS INDEX: 22.92 KG/M2 | OXYGEN SATURATION: 100 % | SYSTOLIC BLOOD PRESSURE: 120 MMHG | HEART RATE: 81 BPM | HEIGHT: 69 IN

## 2025-03-12 DIAGNOSIS — K52.9 GASTROENTERITIS: Primary | ICD-10-CM

## 2025-03-12 LAB
ALBUMIN SERPL-MCNC: 3.8 G/DL (ref 3.5–5.2)
ALBUMIN/GLOB SERPL: 1.7 G/DL
ALP SERPL-CCNC: 49 U/L (ref 39–117)
ALT SERPL W P-5'-P-CCNC: 9 U/L (ref 1–33)
ANION GAP SERPL CALCULATED.3IONS-SCNC: 9.2 MMOL/L (ref 5–15)
AST SERPL-CCNC: 13 U/L (ref 1–32)
BACTERIA UR QL AUTO: ABNORMAL /HPF
BASOPHILS # BLD AUTO: 0.01 10*3/MM3 (ref 0–0.2)
BASOPHILS NFR BLD AUTO: 0.3 % (ref 0–1.5)
BILIRUB SERPL-MCNC: 0.3 MG/DL (ref 0–1.2)
BILIRUB UR QL STRIP: NEGATIVE
BUN SERPL-MCNC: 6 MG/DL (ref 6–20)
BUN/CREAT SERPL: 8.8 (ref 7–25)
CALCIUM SPEC-SCNC: 8.2 MG/DL (ref 8.6–10.5)
CHLORIDE SERPL-SCNC: 104 MMOL/L (ref 98–107)
CLARITY UR: CLEAR
CO2 SERPL-SCNC: 23.8 MMOL/L (ref 22–29)
COLOR UR: ABNORMAL
CREAT SERPL-MCNC: 0.68 MG/DL (ref 0.57–1)
DEPRECATED RDW RBC AUTO: 41.2 FL (ref 37–54)
EGFRCR SERPLBLD CKD-EPI 2021: 118.8 ML/MIN/1.73
EOSINOPHIL # BLD AUTO: 0.01 10*3/MM3 (ref 0–0.4)
EOSINOPHIL NFR BLD AUTO: 0.3 % (ref 0.3–6.2)
ERYTHROCYTE [DISTWIDTH] IN BLOOD BY AUTOMATED COUNT: 12.3 % (ref 12.3–15.4)
FLUAV RNA RESP QL NAA+PROBE: NOT DETECTED
FLUBV RNA RESP QL NAA+PROBE: NOT DETECTED
GLOBULIN UR ELPH-MCNC: 2.3 GM/DL
GLUCOSE SERPL-MCNC: 95 MG/DL (ref 65–99)
GLUCOSE UR STRIP-MCNC: NEGATIVE MG/DL
HCG INTACT+B SERPL-ACNC: <0.5 MIU/ML
HCT VFR BLD AUTO: 41.6 % (ref 34–46.6)
HGB BLD-MCNC: 13.6 G/DL (ref 12–15.9)
HGB UR QL STRIP.AUTO: ABNORMAL
HOLD SPECIMEN: NORMAL
HOLD SPECIMEN: NORMAL
HYALINE CASTS UR QL AUTO: ABNORMAL /LPF
IMM GRANULOCYTES # BLD AUTO: 0 10*3/MM3 (ref 0–0.05)
IMM GRANULOCYTES NFR BLD AUTO: 0 % (ref 0–0.5)
KETONES UR QL STRIP: ABNORMAL
LEUKOCYTE ESTERASE UR QL STRIP.AUTO: ABNORMAL
LIPASE SERPL-CCNC: 17 U/L (ref 13–60)
LYMPHOCYTES # BLD AUTO: 0.62 10*3/MM3 (ref 0.7–3.1)
LYMPHOCYTES NFR BLD AUTO: 18.6 % (ref 19.6–45.3)
MCH RBC QN AUTO: 30 PG (ref 26.6–33)
MCHC RBC AUTO-ENTMCNC: 32.7 G/DL (ref 31.5–35.7)
MCV RBC AUTO: 91.6 FL (ref 79–97)
MONOCYTES # BLD AUTO: 0.22 10*3/MM3 (ref 0.1–0.9)
MONOCYTES NFR BLD AUTO: 6.6 % (ref 5–12)
NEUTROPHILS NFR BLD AUTO: 2.47 10*3/MM3 (ref 1.7–7)
NEUTROPHILS NFR BLD AUTO: 74.2 % (ref 42.7–76)
NITRITE UR QL STRIP: NEGATIVE
NRBC BLD AUTO-RTO: 0 /100 WBC (ref 0–0.2)
PH UR STRIP.AUTO: 5.5 [PH] (ref 5–8)
PLATELET # BLD AUTO: 205 10*3/MM3 (ref 140–450)
PMV BLD AUTO: 10.3 FL (ref 6–12)
POTASSIUM SERPL-SCNC: 3.7 MMOL/L (ref 3.5–5.2)
PROT SERPL-MCNC: 6.1 G/DL (ref 6–8.5)
PROT UR QL STRIP: NEGATIVE
RBC # BLD AUTO: 4.54 10*6/MM3 (ref 3.77–5.28)
RBC # UR STRIP: ABNORMAL /HPF
REF LAB TEST METHOD: ABNORMAL
RSV RNA RESP QL NAA+PROBE: NOT DETECTED
SARS-COV-2 RNA RESP QL NAA+PROBE: NOT DETECTED
SODIUM SERPL-SCNC: 137 MMOL/L (ref 136–145)
SP GR UR STRIP: 1.02 (ref 1–1.03)
SQUAMOUS #/AREA URNS HPF: ABNORMAL /HPF
UROBILINOGEN UR QL STRIP: ABNORMAL
WBC # UR STRIP: ABNORMAL /HPF
WBC NRBC COR # BLD AUTO: 3.33 10*3/MM3 (ref 3.4–10.8)
WHOLE BLOOD HOLD COAG: NORMAL
WHOLE BLOOD HOLD SPECIMEN: NORMAL

## 2025-03-12 PROCEDURE — 25810000003 SODIUM CHLORIDE 0.9 % SOLUTION: Performed by: EMERGENCY MEDICINE

## 2025-03-12 PROCEDURE — 25010000002 ONDANSETRON PER 1 MG: Performed by: EMERGENCY MEDICINE

## 2025-03-12 PROCEDURE — 80053 COMPREHEN METABOLIC PANEL: CPT | Performed by: EMERGENCY MEDICINE

## 2025-03-12 PROCEDURE — 96374 THER/PROPH/DIAG INJ IV PUSH: CPT

## 2025-03-12 PROCEDURE — 84702 CHORIONIC GONADOTROPIN TEST: CPT | Performed by: EMERGENCY MEDICINE

## 2025-03-12 PROCEDURE — 99283 EMERGENCY DEPT VISIT LOW MDM: CPT

## 2025-03-12 PROCEDURE — 85025 COMPLETE CBC W/AUTO DIFF WBC: CPT | Performed by: EMERGENCY MEDICINE

## 2025-03-12 PROCEDURE — 87637 SARSCOV2&INF A&B&RSV AMP PRB: CPT | Performed by: EMERGENCY MEDICINE

## 2025-03-12 PROCEDURE — 81001 URINALYSIS AUTO W/SCOPE: CPT | Performed by: EMERGENCY MEDICINE

## 2025-03-12 PROCEDURE — 83690 ASSAY OF LIPASE: CPT | Performed by: EMERGENCY MEDICINE

## 2025-03-12 RX ORDER — SODIUM CHLORIDE 0.9 % (FLUSH) 0.9 %
10 SYRINGE (ML) INJECTION AS NEEDED
Status: DISCONTINUED | OUTPATIENT
Start: 2025-03-12 | End: 2025-03-12 | Stop reason: HOSPADM

## 2025-03-12 RX ORDER — ONDANSETRON 2 MG/ML
4 INJECTION INTRAMUSCULAR; INTRAVENOUS ONCE
Status: COMPLETED | OUTPATIENT
Start: 2025-03-12 | End: 2025-03-12

## 2025-03-12 RX ORDER — ONDANSETRON 4 MG/1
4 TABLET, ORALLY DISINTEGRATING ORAL EVERY 8 HOURS PRN
Qty: 14 TABLET | Refills: 0 | Status: SHIPPED | OUTPATIENT
Start: 2025-03-12

## 2025-03-12 RX ADMIN — ONDANSETRON 4 MG: 2 INJECTION INTRAMUSCULAR; INTRAVENOUS at 14:48

## 2025-03-12 RX ADMIN — SODIUM CHLORIDE 1000 ML: 9 INJECTION, SOLUTION INTRAVENOUS at 14:47

## 2025-03-12 NOTE — ED PROVIDER NOTES
Time: 2:14 PM EDT  Date of encounter:  3/12/2025  Independent Historian/Clinical History and Information was obtained by:   Patient    History is limited by: N/A    Chief Complaint: Nausea vomiting abdominal pain      History of Present Illness:  Patient is a 34 y.o. year old female who presents to the emergency department for evaluation of nausea vomiting and abdominal pain.  Patient reports her household has been ill with a stomach bug.  She notes nausea and vomiting but also some abdominal discomfort.  She went to urgent care for evaluation and they sent her to the emergency department for further evaluation.      Patient Care Team  Primary Care Provider: Lazarus Luevano APRN    Past Medical History:     No Known Allergies  Past Medical History:   Diagnosis Date    Hx of Migraine withOUT aura     Hx of Preeclampsia     IBS (irritable bowel syndrome)     Mild intermittent asthma - dx as a child, no meds     Ovarian cyst     Scoliosis     Urinary tract infection     Vitamin D deficiency 2022     Past Surgical History:   Procedure Laterality Date     SECTION      LTCS 2019 Preen 33+5 NRFHR, emergent     SECTION N/A 2022    Procedure:  SECTION REPEAT;  Surgeon: Beata Castillo DO;  Location: [facility] LABOR DELIVERY;  Service: Obstetrics/Gynecology;  Laterality: N/A;    DILATATION AND CURETTAGE       Preen MAB, 6weeks by US, 9weeks by LMP     Family History   Problem Relation Age of Onset    No Known Problems Father     Prostate cancer Paternal Grandfather     Melanoma Paternal Grandfather     Ovarian cancer Neg Hx     Uterine cancer Neg Hx     Breast cancer Neg Hx     Colon cancer Neg Hx     Clotting disorder Neg Hx     Deep vein thrombosis Neg Hx     Pulmonary embolism Neg Hx        Home Medications:  Prior to Admission medications    Medication Sig Start Date End Date Taking? Authorizing Provider   amoxicillin (AMOXIL) 500 MG capsule Take 1 capsule by mouth 3 (Three) Times a  "Day.  Patient not taking: Reported on 12/31/2024 12/12/24   Logan Elias APRN   metoclopramide (REGLAN) 5 MG tablet Take 2 tablets by mouth 3 (Three) Times a Day As Needed (nausea). 12/18/24   Jefferson Maciel PA-C   predniSONE (DELTASONE) 20 MG tablet Take 1 tablet by mouth 2 (Two) Times a Day. 12/16/24   Niki Go APRN        Social History:   Social History     Tobacco Use    Smoking status: Never     Passive exposure: Never    Smokeless tobacco: Never   Vaping Use    Vaping status: Never Used   Substance Use Topics    Alcohol use: No    Drug use: No         Review of Systems:  Review of Systems   Constitutional:  Negative for chills and fever.   HENT:  Negative for congestion, rhinorrhea and sore throat.    Eyes:  Negative for pain and visual disturbance.   Respiratory:  Negative for apnea, cough, chest tightness and shortness of breath.    Cardiovascular:  Negative for chest pain and palpitations.   Gastrointestinal:  Positive for abdominal pain, nausea and vomiting. Negative for diarrhea.   Genitourinary:  Negative for difficulty urinating and dysuria.   Musculoskeletal:  Negative for joint swelling and myalgias.   Skin:  Negative for color change.   Neurological:  Negative for seizures and headaches.   Psychiatric/Behavioral: Negative.     All other systems reviewed and are negative.       Physical Exam:  /77   Pulse 96   Temp 98.7 °F (37.1 °C)   Resp 18   Ht 175.3 cm (69\")   Wt 70.2 kg (154 lb 12.2 oz)   LMP 02/20/2025   SpO2 99%   BMI 22.85 kg/m²     Physical Exam  Vitals and nursing note reviewed.   Constitutional:       General: She is not in acute distress.     Appearance: Normal appearance. She is not toxic-appearing.   HENT:      Head: Normocephalic and atraumatic.      Jaw: There is normal jaw occlusion.      Mouth/Throat:      Mouth: Mucous membranes are dry.   Eyes:      General: Lids are normal.      Extraocular Movements: Extraocular movements intact.      " Conjunctiva/sclera: Conjunctivae normal.      Pupils: Pupils are equal, round, and reactive to light.   Cardiovascular:      Rate and Rhythm: Normal rate and regular rhythm.      Pulses: Normal pulses.      Heart sounds: Normal heart sounds.   Pulmonary:      Effort: Pulmonary effort is normal. No respiratory distress.      Breath sounds: Normal breath sounds. No wheezing or rhonchi.   Abdominal:      General: Abdomen is flat.      Palpations: Abdomen is soft.      Tenderness: There is no abdominal tenderness. There is no guarding or rebound.   Musculoskeletal:         General: Normal range of motion.      Cervical back: Normal range of motion and neck supple.      Right lower leg: No edema.      Left lower leg: No edema.   Skin:     General: Skin is warm and dry.   Neurological:      Mental Status: She is alert and oriented to person, place, and time. Mental status is at baseline.   Psychiatric:         Mood and Affect: Mood normal.                    Medical Decision Making:      Comorbidities that affect care:    None    External Notes reviewed:    None      The following orders were placed and all results were independently analyzed by me:  Orders Placed This Encounter   Procedures    COVID PRE-OP / PRE-PROCEDURE SCREENING ORDER (NO ISOLATION) - Swab, Nasopharynx    COVID-19, FLU A/B, RSV PCR 1 HR TAT - Swab, Nasopharynx    [place] Draw    Comprehensive Metabolic Panel    Lipase    Urinalysis With Microscopic If Indicated (No Culture) - Urine, Clean Catch    hCG, Quantitative, Pregnancy    CBC Auto Differential    Urinalysis, Microscopic Only - Urine, Clean Catch    NPO Diet NPO Type: Strict NPO    Undress & Gown    Insert Peripheral IV    CBC & Differential    Green Top (Gel)    Lavender Top    Gold Top - SST    Light Blue Top       Medications Given in the Emergency Department:  Medications   sodium chloride 0.9 % flush 10 mL (has no administration in time range)   sodium chloride 0.9 % bolus 1,000 mL (has no  administration in time range)   ondansetron (ZOFRAN) injection 4 mg (has no administration in time range)        ED Course:         Labs:    Lab Results (last 24 hours)       Procedure Component Value Units Date/Time    POCT URINALYSIS DIPSTICK, AUTOMATED [[id number]]  (Abnormal) Collected: 03/12/25 0825    Specimen: Urine Updated: 03/12/25 0913     Color, UA Yellow     Appearance, Fluid Clear     Specific Gravity, UA 1.015     pH, UA 5     Leukocytes, UA Negative     Nitrite, UA Negative     Total Protein, urine Negative     Glucose, UA Normal     External Ketones, Urine Negative     Urobilinogen, UA Normal mg/dL      External Bilirubin, Urine Negative     Blood, UA 50 [place]/uL     QC Acceptable     Lot Number [id number]     Expiration Date 11/30/2025     Comment --    POCT HCG, URINE, BY VISUAL COLOR [[id number]] Collected: 03/12/25 0836    Specimen: Urine Updated: 03/12/25 0913     HCG, Urine, QL Negative     QC Yes     Lot Number [id number]     Expiration Date 06/10/2026     Comment --    Urinalysis With Microscopic If Indicated (No Culture) - Urine, Clean Catch [[id number]]  (Abnormal) Collected: 03/12/25 1023    Specimen: Urine, Clean Catch Updated: 03/12/25 1040     Color, UA Dark Yellow     Appearance, UA Clear     pH, UA 5.5     Specific Gravity, UA 1.025     Glucose, UA Negative     Ketones, UA Trace     Bilirubin, UA Negative     Blood, UA Trace     Protein, UA Negative     Leuk Esterase, UA Trace     Nitrite, UA Negative     Urobilinogen, UA 1.0 E.U./dL    Urinalysis, Microscopic Only - Urine, Clean Catch [[id number]]  (Abnormal) Collected: 03/12/25 1023    Specimen: Urine, Clean Catch Updated: 03/12/25 1040     RBC, UA 3-5 /HPF      WBC, UA 0-2 /HPF      Bacteria, UA None Seen /HPF      Squamous Epithelial Cells, UA 0-2 /HPF      Hyaline Casts, UA None Seen /LPF      Methodology Automated Microscopy    COVID PRE-OP / PRE-PROCEDURE SCREENING ORDER (NO ISOLATION) - Swab, Nasopharynx [[id number]]  (Normal)  Collected: 03/12/25 1024    Specimen: Swab from Nasopharynx Updated: 03/12/25 1110    Narrative:      The following orders were created for panel order COVID PRE-OP / PRE-PROCEDURE SCREENING ORDER (NO ISOLATION) - Swab, Nasopharynx.  Procedure                               Abnormality         Status                     ---------                               -----------         ------                     COVID-19, FLU A/B, RSV P...[[id number]]  Normal              Final result                 Please view results for these tests on the individual orders.    COVID-19, FLU A/B, RSV PCR 1 HR TAT - Swab, Nasopharynx [[id number]]  (Normal) Collected: 03/12/25 1024    Specimen: Swab from Nasopharynx Updated: 03/12/25 1110     COVID19 Not Detected     Influenza A PCR Not Detected     Influenza B PCR Not Detected     RSV, PCR Not Detected    Narrative:      Fact sheet for providers: https://www.fda.gov/media/158880/download    Fact sheet for patients: https://www.fda.gov/media/613669/download    Test performed by PCR.    CBC & Differential [[id number]]  (Abnormal) Collected: 03/12/25 1026    Specimen: Blood Updated: 03/12/25 1326    Narrative:      The following orders were created for panel order CBC & Differential.  Procedure                               Abnormality         Status                     ---------                               -----------         ------                     CBC Auto Differential[[id number]]        Abnormal            Final result                 Please view results for these tests on the individual orders.    Comprehensive Metabolic Panel [[id number]]  (Abnormal) Collected: 03/12/25 1026    Specimen: Blood Updated: 03/12/25 1101     Glucose 95 mg/dL      BUN 6 mg/dL      Creatinine 0.68 mg/dL      Sodium 137 mmol/L      Potassium 3.7 mmol/L      Chloride 104 mmol/L      CO2 23.8 mmol/L      Calcium 8.2 mg/dL      Total Protein 6.1 g/dL      Albumin 3.8 g/dL      ALT (SGPT) 9 U/L      AST (SGOT) 13  U/L      Alkaline Phosphatase 49 U/L      Total Bilirubin 0.3 mg/dL      Globulin 2.3 gm/dL      A/G Ratio 1.7 g/dL      BUN/Creatinine Ratio 8.8     Anion Gap 9.2 mmol/L      eGFR 118.8 mL/min/1.73     Narrative:      GFR Categories in Chronic Kidney Disease (CKD)      GFR Category          GFR (mL/min/1.73)    Interpretation  G1                     90 or greater         Normal or high (1)  G2                      60-89                Mild decrease (1)  G3a                   45-59                Mild to moderate decrease  G3b                   30-44                Moderate to severe decrease  G4                    15-29                Severe decrease  G5                    14 or less           Kidney failure          (1)In the absence of evidence of kidney disease, neither GFR category G1 or G2 fulfill the criteria for CKD.    eGFR calculation 2021 CKD-EPI creatinine equation, which does not include race as a factor    Lipase [[id number]]  (Normal) Collected: 03/12/25 1026    Specimen: Blood Updated: 03/12/25 1101     Lipase 17 U/L     hCG, Quantitative, Pregnancy [[id number]] Collected: 03/12/25 1026    Specimen: Blood Updated: 03/12/25 1059     HCG Quantitative <0.50 mIU/mL     Narrative:      HCG Ranges by Gestational Age    Females - non-pregnant premenopausal   </= 1mIU/mL HCG  Females - postmenopausal               </= 7mIU/mL HCG    3 Weeks       5.4   -      72 mIU/mL  4 Weeks      10.2   -     708 mIU/mL  5 Weeks       217   -   8,245 mIU/mL  6 Weeks       152   -  32,177 mIU/mL  7 Weeks     4,059   - 153,767 mIU/mL  8 Weeks    31,366   - 149,094 mIU/mL  9 Weeks    59,109   - 135,901 mIU/mL  10 Weeks   44,186   - 170,409 mIU/mL  12 Weeks   27,107   - 201,615 mIU/mL  14 Weeks   24,302   -  93,646 mIU/mL  15 Weeks   12,540   -  69,747 mIU/mL  16 Weeks    8,904   -  55,332 mIU/mL  17 Weeks    8,240   -  51,793 mIU/mL  18 Weeks    9,649   -  55,271 mIU/mL      CBC Auto Differential [[id number]]  (Abnormal)  Collected: 03/12/25 1026    Specimen: Blood Updated: 03/12/25 1326     WBC 3.33 10*3/mm3      RBC 4.54 10*6/mm3      Hemoglobin 13.6 g/dL      Hematocrit 41.6 %      MCV 91.6 fL      MCH 30.0 pg      MCHC 32.7 g/dL      RDW 12.3 %      RDW-SD 41.2 fl      MPV 10.3 fL      Platelets 205 10*3/mm3      Neutrophil % 74.2 %      Lymphocyte % 18.6 %      Monocyte % 6.6 %      Eosinophil % 0.3 %      Basophil % 0.3 %      Immature Grans % 0.0 %      Neutrophils, Absolute 2.47 10*3/mm3      Lymphocytes, Absolute 0.62 10*3/mm3      Monocytes, Absolute 0.22 10*3/mm3      Eosinophils, Absolute 0.01 10*3/mm3      Basophils, Absolute 0.01 10*3/mm3      Immature Grans, Absolute 0.00 10*3/mm3      nRBC 0.0 /100 WBC              Imaging:    No Radiology Exams Resulted Within Past 24 Hours      Differential Diagnosis and Discussion:    Abdominal Pain: Based on the patient's signs and symptoms, I considered abdominal aortic aneurysm, small bowel obstruction, pancreatitis, acute cholecystitis, acute appendecitis, peptic ulcer disease, gastritis, colitis, endocrine disorders, irritable bowel syndrome and other differential diagnosis an etiology of the patient's abdominal pain.  Vomiting: Differential diagnosis includes but is not limited to migraine, labyrinthine disorders, psychogenic, metabolic and endocrine causes, peptic ulcer, gastric outlet obstruction, gastritis, gastroenteritis, appendicitis, intestinal obstruction, paralytic ileus, food poisoning, cholecystitis, acute hepatitis, acute pancreatitis, acute febrile illness, and myocardial infarction.    PROCEDURES:    Labs were collected in the emergency department and all labs were reviewed and interpreted by me.    No orders to display       Procedures    MDM  Number of Diagnoses or Management Options  Diagnosis management comments: In summary this is a 34-year-old female who presents for evaluation of abdominal pain and vomiting.  CBC independently reviewed and interpreted by  me and shows no critical abnormalities.  CMP independently reviewed and interpreted by me and shows no critical abnormalities.  Patient has received IV fluids and antiemetics in the emergency department with improvement of symptoms.  She will be discharged home with prescription antiemetics.  She is otherwise well-appearing in no acute distress.  Very strict return to ER and follow-up instructions have been provided to the patient.                         Patient Care Considerations:    CT ABDOMEN AND PELVIS: I considered ordering a CT scan of the abdomen and pelvis however benign abdominal examination, unremarkable laboratory evaluation      Consultants/Shared Management Plan:    None    Social Determinants of Health:    Patient is independent, reliable, and has access to care.       Disposition and Care Coordination:    Discharged: The patient is suitable and stable for discharge with no need for consideration of admission.    I have explained the patient´s condition, diagnoses and treatment plan based on the information available to me at this time. I have answered questions and addressed any concerns. The patient has a good  understanding of the patient´s diagnosis, condition, and treatment plan as can be expected at this point. The vital signs have been stable. The patient´s condition is stable and appropriate for discharge from the emergency department.      The patient will pursue further outpatient evaluation with the primary care physician or other designated or consulting physician as outlined in the discharge instructions. They are agreeable to this plan of care and follow-up instructions have been explained in detail. The patient has received these instructions in written format and has expressed an understanding of the discharge instructions. The patient is aware that any significant change in condition or worsening of symptoms should prompt an immediate return to this or the closest emergency  department or call to 911.  I have explained discharge medications and the need for follow up with the patient/caretakers. This was also printed in the discharge instructions. Patient was discharged with the following medications and follow up:      Medication List        New Prescriptions      ondansetron ODT 4 MG disintegrating tablet  Commonly known as: ZOFRAN-ODT  Place 1 tablet on the tongue Every 8 (Eight) Hours As Needed for Nausea or Vomiting.               Where to Get Your Medications        These medications were sent to [id number] - [place] - [address] AT [facility] - [phone]  - [phone]   [address] [place] [address]      Phone: [phone]   ondansetron ODT 4 MG disintegrating tablet      Lazarus Luevano, APRN  [address]  [address]  [place] [address]  [phone]    In 1 week         Final diagnoses:   Gastroenteritis        ED Disposition       ED Disposition   Discharge    Condition   Stable    Comment   --               This medical record created using voice recognition software.             Dayton Javier MD  03/12/25 [id number]

## 2025-03-12 NOTE — Clinical Note
[facility] EMERGENCY ROOM  [address] [place] [address]  Phone: [phone]  Fax: [phone]    Ailyn Bedoya was seen and treated in our emergency department on 3/12/2025.  She may return to work on 03/14/2025.         Thank you for choosing [facility].    Dayton Javier MD

## 2025-06-26 ENCOUNTER — OFFICE VISIT (OUTPATIENT)
Dept: FAMILY MEDICINE CLINIC | Facility: CLINIC | Age: 35
End: 2025-06-26
Payer: COMMERCIAL

## 2025-06-26 VITALS
DIASTOLIC BLOOD PRESSURE: 70 MMHG | SYSTOLIC BLOOD PRESSURE: 110 MMHG | WEIGHT: 153.3 LBS | HEIGHT: 69 IN | OXYGEN SATURATION: 98 % | TEMPERATURE: 97.7 F | BODY MASS INDEX: 22.71 KG/M2 | HEART RATE: 108 BPM

## 2025-06-26 DIAGNOSIS — H92.02 LEFT EAR PAIN: ICD-10-CM

## 2025-06-26 DIAGNOSIS — J30.89 NON-SEASONAL ALLERGIC RHINITIS, UNSPECIFIED TRIGGER: ICD-10-CM

## 2025-06-26 DIAGNOSIS — H69.92 DYSFUNCTION OF LEFT EUSTACHIAN TUBE: Primary | ICD-10-CM

## 2025-06-26 PROCEDURE — 99214 OFFICE O/P EST MOD 30 MIN: CPT

## 2025-06-26 RX ORDER — NEOMYCIN SULFATE, POLYMYXIN B SULFATE AND HYDROCORTISONE 3.5; 10000; 1 MG/ML; [IU]/ML; MG/ML
3 SOLUTION AURICULAR (OTIC) 4 TIMES DAILY
Qty: 10 ML | Refills: 0 | Status: SHIPPED | OUTPATIENT
Start: 2025-06-26

## 2025-06-26 RX ORDER — FLUTICASONE PROPIONATE 50 MCG
2 SPRAY, SUSPENSION (ML) NASAL DAILY
Qty: 18.2 G | Refills: 12 | Status: SHIPPED | OUTPATIENT
Start: 2025-06-26

## 2025-06-26 NOTE — PROGRESS NOTES
"Ailyn Bedoya presents to [facility] with complaints of left ear pain.      History of Present Illness  This is a 34-year-old female who presents to clinic with complaints of persistent issues with her left ear.    States she has been battling some left ear pain, fullness, decreased hearing for quite some time.  States that she is been seen by a few different providers for this, both in this office and in urgent care, has been treated for an infection, given antibiotics and steroids, and she still has quite a bit of pain intermittently on her left ear.  States that she feels like it is even swollen down the left side of her neck, and can feel the drainage at times even running down the left side of her throat.  States she has not had a fever chills or bodyaches, she is been taking allergy medication, but nothing seems to be giving her complete relief.  It is now lingering and causing a headache as well.    The following portions of the patient's history were personally reviewed and updated as appropriate: allergies, current medications, past medical history, past surgical history, past family history, and past social history.       Objective   Vital Signs:   /70 (BP Location: Left arm, Patient Position: Sitting, Cuff Size: Adult)   Pulse 108   Temp 97.7 °F (36.5 °C)   Ht 175.3 cm (69.02\")   Wt 69.5 kg (153 lb 4.8 oz)   SpO2 98%   BMI 22.63 kg/m²     Body mass index is 22.63 kg/m².    All labs, imaging, test results, and specialty provider notes reviewed with patient.     Physical Exam  Vitals reviewed.   Constitutional:       Appearance: Normal appearance.   HENT:      Left Ear: Swelling present. A middle ear effusion is present. Tympanic membrane is erythematous and bulging.   Cardiovascular:      Rate and Rhythm: Normal rate and regular rhythm.      Pulses: Normal pulses.      Heart sounds: Normal heart sounds.   Pulmonary:      Effort: Pulmonary effort is normal.     "  Breath sounds: Normal breath sounds.   Neurological:      General: No focal deficit present.      Mental Status: She is alert and oriented to person, place, and time.                  BMI is within normal parameters. No other follow-up for BMI required.            Assessment and Plan:  Diagnoses and all orders for this visit:    1. Dysfunction of left eustachian tube (Primary)  -     Ambulatory Referral to ENT (Otolaryngology)    2. Left ear pain  -     Ambulatory Referral to ENT (Otolaryngology)    3. Non-seasonal allergic rhinitis, unspecified trigger  -     Ambulatory Referral to ENT (Otolaryngology)    Other orders  -     fluticasone (FLONASE) 50 MCG/ACT nasal spray; Administer 2 sprays into the nostril(s) as directed by provider Daily.  Dispense: 18.2 g; Refill: 12  -     neomycin-polymyxin-hydrocortisone (CORTISPORIN) 3.5-97025-8 otic solution; Administer 3 drops into both ears 4 (Four) Times a Day.  Dispense: 10 mL; Refill: 0    At this point, left ear does not look infected and I do feel as though amoxicillin treat at this appropriately.  Do feel like patient has eustachian tube dysfunction, discussed with her to stay on her allergy medication, will add Flonase, will also prescribe eardrops that she can use for the next 7 days as well.  Will go ahead and make active referral to ENT for further evaluation if symptoms fail to improve with this next course of treatment.      Follow Up:  No follow-ups on file.    Patient was given instructions and counseling regarding her condition or for health maintenance advice. Please see specific information pulled into the AVS if appropriate.

## (undated) DEVICE — VIOLET BRAIDED (POLYGLACTIN 910), SYNTHETIC ABSORBABLE SUTURE: Brand: COATED VICRYL

## (undated) DEVICE — CVR HNDL LT SURG ACCSSRY BLU STRL

## (undated) DEVICE — TRY CATH FOL ADVANCE SIL W/BAG 16F

## (undated) DEVICE — NEEDLE,18GX1.5",REG,BEVEL: Brand: MEDLINE

## (undated) DEVICE — SUT VICRYL 3/0 CT1 27IN J258H

## (undated) DEVICE — STERILE POLYISOPRENE POWDER-FREE SURGICAL GLOVES WITH EMOLLIENT COATING: Brand: PROTEXIS

## (undated) DEVICE — SUT VIC 0 CTX 36IN J978H

## (undated) DEVICE — PROXIMATE RH ROTATING HEAD SKIN STAPLERS (35 WIDE) CONTAINS 35 STAINLESS STEEL STAPLES: Brand: PROXIMATE

## (undated) DEVICE — SUT CHRM 0 CT1 36IN 924H

## (undated) DEVICE — DEV TRANSF BLD W/LUER ADPT CA/198

## (undated) DEVICE — PAD GRND REM POLYHESIVE A/ DISP

## (undated) DEVICE — INTENDED FOR TISSUE SEPARATION, AND OTHER PROCEDURES THAT REQUIRE A SHARP SURGICAL BLADE TO PUNCTURE OR CUT.: Brand: BARD-PARKER ® CARBON RIB-BACK BLADES

## (undated) DEVICE — GLV SURG BIOGEL LTX PF 6 1/2

## (undated) DEVICE — SUT MNCRYL 3/0 CT1 36 IN Y944H

## (undated) DEVICE — Device: Brand: PORTEX

## (undated) DEVICE — C SECTION PACK: Brand: MEDLINE INDUSTRIES, INC.